# Patient Record
Sex: FEMALE | Race: WHITE | Employment: FULL TIME | URBAN - NONMETROPOLITAN AREA
[De-identification: names, ages, dates, MRNs, and addresses within clinical notes are randomized per-mention and may not be internally consistent; named-entity substitution may affect disease eponyms.]

---

## 2019-01-24 ENCOUNTER — HOSPITAL ENCOUNTER (EMERGENCY)
Age: 34
Discharge: HOME | End: 2019-01-24
Payer: COMMERCIAL

## 2019-01-24 ENCOUNTER — APPOINTMENT (OUTPATIENT)
Dept: CT IMAGING | Age: 34
End: 2019-01-24
Payer: COMMERCIAL

## 2019-01-24 VITALS
RESPIRATION RATE: 18 BRPM | WEIGHT: 172 LBS | DIASTOLIC BLOOD PRESSURE: 90 MMHG | BODY MASS INDEX: 33.77 KG/M2 | SYSTOLIC BLOOD PRESSURE: 155 MMHG | HEIGHT: 60 IN | TEMPERATURE: 97.4 F | OXYGEN SATURATION: 96 % | HEART RATE: 90 BPM

## 2019-01-24 DIAGNOSIS — A49.9 BACTERIAL UTI: ICD-10-CM

## 2019-01-24 DIAGNOSIS — N21.0 CALCULUS OF URINARY BLADDER: ICD-10-CM

## 2019-01-24 DIAGNOSIS — N39.0 BACTERIAL UTI: ICD-10-CM

## 2019-01-24 DIAGNOSIS — N20.0 KIDNEY STONE: Primary | ICD-10-CM

## 2019-01-24 DIAGNOSIS — R10.9 FLANK PAIN: ICD-10-CM

## 2019-01-24 LAB
ALBUMIN SERPL-MCNC: 3.9 G/DL (ref 3.5–5.2)
ALP BLD-CCNC: 63 U/L (ref 35–104)
ALT SERPL-CCNC: 22 U/L (ref 5–33)
ANION GAP SERPL CALCULATED.3IONS-SCNC: 12 MMOL/L (ref 7–19)
AST SERPL-CCNC: 20 U/L (ref 5–32)
BACTERIA: ABNORMAL /HPF
BASOPHILS ABSOLUTE: 0.1 K/UL (ref 0–0.2)
BASOPHILS RELATIVE PERCENT: 0.5 % (ref 0–1)
BILIRUB SERPL-MCNC: 0.3 MG/DL (ref 0.2–1.2)
BILIRUBIN URINE: NEGATIVE
BLOOD, URINE: ABNORMAL
BUN BLDV-MCNC: 14 MG/DL (ref 6–20)
CALCIUM SERPL-MCNC: 9 MG/DL (ref 8.6–10)
CHLORIDE BLD-SCNC: 104 MMOL/L (ref 98–111)
CLARITY: ABNORMAL
CO2: 22 MMOL/L (ref 22–29)
COLOR: YELLOW
CREAT SERPL-MCNC: 0.8 MG/DL (ref 0.5–0.9)
EOSINOPHILS ABSOLUTE: 0.1 K/UL (ref 0–0.6)
EOSINOPHILS RELATIVE PERCENT: 0.9 % (ref 0–5)
EPITHELIAL CELLS, UA: ABNORMAL /HPF
GFR NON-AFRICAN AMERICAN: >60
GLUCOSE BLD-MCNC: 152 MG/DL (ref 74–109)
GLUCOSE URINE: >=1000 MG/DL
HCG QUALITATIVE: NEGATIVE
HCT VFR BLD CALC: 42.9 % (ref 37–47)
HEMOGLOBIN: 13.6 G/DL (ref 12–16)
KETONES, URINE: NEGATIVE MG/DL
LACTIC ACID: 2.2 MMOL/L (ref 0.5–1.9)
LEUKOCYTE ESTERASE, URINE: ABNORMAL
LIPASE: 34 U/L (ref 13–60)
LYMPHOCYTES ABSOLUTE: 1.3 K/UL (ref 1.1–4.5)
LYMPHOCYTES RELATIVE PERCENT: 14.3 % (ref 20–40)
MCH RBC QN AUTO: 29 PG (ref 27–31)
MCHC RBC AUTO-ENTMCNC: 31.7 G/DL (ref 33–37)
MCV RBC AUTO: 91.5 FL (ref 81–99)
MONOCYTES ABSOLUTE: 0.5 K/UL (ref 0–0.9)
MONOCYTES RELATIVE PERCENT: 5.4 % (ref 0–10)
NEUTROPHILS ABSOLUTE: 7.2 K/UL (ref 1.5–7.5)
NEUTROPHILS RELATIVE PERCENT: 78.5 % (ref 50–65)
NITRITE, URINE: NEGATIVE
PDW BLD-RTO: 13.2 % (ref 11.5–14.5)
PH UA: 6
PLATELET # BLD: 371 K/UL (ref 130–400)
PMV BLD AUTO: 9.7 FL (ref 9.4–12.3)
POTASSIUM SERPL-SCNC: 4.6 MMOL/L (ref 3.5–5)
PROTEIN UA: 30 MG/DL
RBC # BLD: 4.69 M/UL (ref 4.2–5.4)
RBC UA: ABNORMAL /HPF (ref 0–2)
SODIUM BLD-SCNC: 138 MMOL/L (ref 136–145)
SPECIFIC GRAVITY UA: 1.03
TOTAL PROTEIN: 6.8 G/DL (ref 6.6–8.7)
URINE REFLEX TO CULTURE: YES
UROBILINOGEN, URINE: 0.2 E.U./DL
WBC # BLD: 9.2 K/UL (ref 4.8–10.8)
WBC UA: ABNORMAL /HPF (ref 0–5)

## 2019-01-24 PROCEDURE — 2580000003 HC RX 258: Performed by: NURSE PRACTITIONER

## 2019-01-24 PROCEDURE — 83690 ASSAY OF LIPASE: CPT

## 2019-01-24 PROCEDURE — 83605 ASSAY OF LACTIC ACID: CPT

## 2019-01-24 PROCEDURE — 87086 URINE CULTURE/COLONY COUNT: CPT

## 2019-01-24 PROCEDURE — 99284 EMERGENCY DEPT VISIT MOD MDM: CPT

## 2019-01-24 PROCEDURE — 36415 COLL VENOUS BLD VENIPUNCTURE: CPT

## 2019-01-24 PROCEDURE — 80053 COMPREHEN METABOLIC PANEL: CPT

## 2019-01-24 PROCEDURE — 99284 EMERGENCY DEPT VISIT MOD MDM: CPT | Performed by: NURSE PRACTITIONER

## 2019-01-24 PROCEDURE — 6360000002 HC RX W HCPCS: Performed by: NURSE PRACTITIONER

## 2019-01-24 PROCEDURE — 74150 CT ABDOMEN W/O CONTRAST: CPT

## 2019-01-24 PROCEDURE — 85025 COMPLETE CBC W/AUTO DIFF WBC: CPT

## 2019-01-24 PROCEDURE — 81001 URINALYSIS AUTO W/SCOPE: CPT

## 2019-01-24 PROCEDURE — 84703 CHORIONIC GONADOTROPIN ASSAY: CPT

## 2019-01-24 PROCEDURE — 96374 THER/PROPH/DIAG INJ IV PUSH: CPT

## 2019-01-24 PROCEDURE — 96375 TX/PRO/DX INJ NEW DRUG ADDON: CPT

## 2019-01-24 RX ORDER — MORPHINE SULFATE/0.9% NACL/PF 1 MG/ML
4 SYRINGE (ML) INJECTION ONCE
Status: COMPLETED | OUTPATIENT
Start: 2019-01-24 | End: 2019-01-24

## 2019-01-24 RX ORDER — ONDANSETRON 2 MG/ML
4 INJECTION INTRAMUSCULAR; INTRAVENOUS ONCE
Status: COMPLETED | OUTPATIENT
Start: 2019-01-24 | End: 2019-01-24

## 2019-01-24 RX ORDER — 0.9 % SODIUM CHLORIDE 0.9 %
1000 INTRAVENOUS SOLUTION INTRAVENOUS ONCE
Status: COMPLETED | OUTPATIENT
Start: 2019-01-24 | End: 2019-01-24

## 2019-01-24 RX ORDER — BUSPIRONE HYDROCHLORIDE 5 MG/1
5 TABLET ORAL 4 TIMES DAILY PRN
COMMUNITY

## 2019-01-24 RX ORDER — ONDANSETRON 4 MG/1
4 TABLET, ORALLY DISINTEGRATING ORAL EVERY 8 HOURS PRN
Qty: 15 TABLET | Refills: 0 | Status: SHIPPED | OUTPATIENT
Start: 2019-01-24 | End: 2020-05-18 | Stop reason: ALTCHOICE

## 2019-01-24 RX ORDER — CEPHALEXIN 500 MG/1
500 CAPSULE ORAL 2 TIMES DAILY
Qty: 10 CAPSULE | Refills: 0 | Status: SHIPPED | OUTPATIENT
Start: 2019-01-24 | End: 2019-01-29

## 2019-01-24 RX ORDER — HYDROCODONE BITARTRATE AND ACETAMINOPHEN 5; 325 MG/1; MG/1
1 TABLET ORAL EVERY 6 HOURS PRN
Qty: 12 TABLET | Refills: 0 | Status: SHIPPED | OUTPATIENT
Start: 2019-01-24 | End: 2019-01-27

## 2019-01-24 RX ORDER — ESCITALOPRAM OXALATE 20 MG/1
20 TABLET ORAL DAILY
COMMUNITY

## 2019-01-24 RX ORDER — NORGESTIMATE AND ETHINYL ESTRADIOL 7DAYSX3 LO
1 KIT ORAL DAILY
COMMUNITY

## 2019-01-24 RX ADMIN — Medication 4 MG: at 14:52

## 2019-01-24 RX ADMIN — SODIUM CHLORIDE 1000 ML: 9 INJECTION, SOLUTION INTRAVENOUS at 14:52

## 2019-01-24 RX ADMIN — ONDANSETRON 4 MG: 2 INJECTION INTRAMUSCULAR; INTRAVENOUS at 14:52

## 2019-01-24 ASSESSMENT — ENCOUNTER SYMPTOMS
NAUSEA: 1
BACK PAIN: 1
ABDOMINAL PAIN: 1

## 2019-01-24 ASSESSMENT — PAIN DESCRIPTION - ORIENTATION: ORIENTATION: RIGHT

## 2019-01-24 ASSESSMENT — PAIN DESCRIPTION - PAIN TYPE: TYPE: ACUTE PAIN

## 2019-01-24 ASSESSMENT — PAIN DESCRIPTION - DESCRIPTORS: DESCRIPTORS: SHARP

## 2019-01-24 ASSESSMENT — PAIN SCALES - GENERAL
PAINLEVEL_OUTOF10: 9
PAINLEVEL_OUTOF10: 4

## 2019-01-24 ASSESSMENT — PAIN DESCRIPTION - LOCATION: LOCATION: ABDOMEN;FLANK

## 2019-01-29 LAB
ORGANISM: ABNORMAL
URINE CULTURE, ROUTINE: ABNORMAL
URINE CULTURE, ROUTINE: ABNORMAL

## 2019-12-05 ENCOUNTER — OFFICE VISIT (OUTPATIENT)
Dept: NEUROLOGY | Age: 34
End: 2019-12-05
Payer: COMMERCIAL

## 2019-12-05 VITALS
WEIGHT: 166 LBS | DIASTOLIC BLOOD PRESSURE: 106 MMHG | HEIGHT: 60 IN | HEART RATE: 108 BPM | OXYGEN SATURATION: 98 % | BODY MASS INDEX: 32.59 KG/M2 | SYSTOLIC BLOOD PRESSURE: 155 MMHG

## 2019-12-05 DIAGNOSIS — G47.00 INSOMNIA, UNSPECIFIED TYPE: ICD-10-CM

## 2019-12-05 DIAGNOSIS — R40.0 SOMNOLENCE, DAYTIME: ICD-10-CM

## 2019-12-05 DIAGNOSIS — R06.83 SNORING: ICD-10-CM

## 2019-12-05 DIAGNOSIS — G47.33 OBSTRUCTIVE SLEEP APNEA: Primary | ICD-10-CM

## 2019-12-05 DIAGNOSIS — R06.81 WITNESSED APNEIC SPELLS: ICD-10-CM

## 2019-12-05 PROCEDURE — 99203 OFFICE O/P NEW LOW 30 MIN: CPT | Performed by: PHYSICIAN ASSISTANT

## 2019-12-09 DIAGNOSIS — G47.33 OBSTRUCTIVE SLEEP APNEA: Primary | ICD-10-CM

## 2019-12-09 DIAGNOSIS — G47.00 INSOMNIA, UNSPECIFIED TYPE: ICD-10-CM

## 2019-12-09 DIAGNOSIS — R06.81 WITNESSED APNEIC SPELLS: ICD-10-CM

## 2019-12-09 DIAGNOSIS — R40.0 SOMNOLENCE, DAYTIME: ICD-10-CM

## 2019-12-09 DIAGNOSIS — R06.83 SNORING: ICD-10-CM

## 2019-12-19 ENCOUNTER — HOSPITAL ENCOUNTER (OUTPATIENT)
Dept: SLEEP CENTER | Age: 34
Discharge: HOME | End: 2019-12-21
Payer: COMMERCIAL

## 2019-12-19 PROCEDURE — G0399 HOME SLEEP TEST/TYPE 3 PORTA: HCPCS

## 2019-12-27 PROCEDURE — 95806 SLEEP STUDY UNATT&RESP EFFT: CPT | Performed by: PSYCHIATRY & NEUROLOGY

## 2020-01-05 NOTE — PROGRESS NOTES
[address] [place]  Phone [phone] Fax [phone]     Patient Name: Georgette Estrada Page 2019  : 1985  Age: 29 y.o.   Patient Address: [address] [place] [address]       Patient Phone: [phone] (home)     REFERRAL  Referred to: DME provider of patient's choice  Rhina A Page is referred for the following:    DME Equipment HPCPS Code Setting   Auto Adjusting CPAP device with flex or comparable pressure relief per comfort  8cm to 20cm   Heated Humidifier  Patient Choice       Replinishible PAP Supplies, 1 year supply  Item HPCPS Code Frequency   Mask of choice  or  1 per 3 months   Nasal Mask cushion/pillows  or  2 per 30 days   Full Face Mask Interface  1 per 30 days   Headgear  1 per 6 months   Tubing, length of choice  or  1 per 3 months   Water Chamber  1 per 6 months   Chinstrap  1 per 6 months   Disposable Filters  2 per 30 days   Reusable Filters  1 per 6 months     Diagnoses:  Obstructive sleep apnea (G47.33)  Length of Need: Lifetime, 99    Ordering Provider: ELIUD Bermeoranjit Spragueon: [id number]         Signature:       Date: 2019      Electronically Signed by Joi Meraz M.D.

## 2020-01-29 ENCOUNTER — TELEPHONE (OUTPATIENT)
Dept: NEUROLOGY | Age: 35
End: 2020-01-29

## 2020-01-29 NOTE — TELEPHONE ENCOUNTER
Called patient to let her know that I have her scheduled an appointment for a follow up after being on her DME sleep machine. NO answer. Left a VM regarding when I have her scheduled an appointment.

## 2020-03-26 ENCOUNTER — TELEPHONE (OUTPATIENT)
Dept: NEUROLOGY | Age: 35
End: 2020-03-26

## 2020-05-13 ENCOUNTER — TELEPHONE (OUTPATIENT)
Dept: NEUROLOGY | Age: 35
End: 2020-05-13

## 2020-05-13 NOTE — TELEPHONE ENCOUNTER
Patient called today and no answer - detailed messaged left to remind them of their appointment: Friday, May 15th @ 2:30pm  and needing to change this appointment to a [facility] Video Visit and to please call the office back at [phone] Option 1. sh

## 2020-05-18 ENCOUNTER — TELEMEDICINE (OUTPATIENT)
Dept: NEUROLOGY | Age: 35
End: 2020-05-18
Payer: COMMERCIAL

## 2020-05-18 PROCEDURE — 99214 OFFICE O/P EST MOD 30 MIN: CPT | Performed by: PHYSICIAN ASSISTANT

## 2020-05-18 NOTE — PROGRESS NOTES
2020    TELEHEALTH EVALUATION -- Audio/Visual (During OYVFG-23 public health emergency)      Patient:   Rebecca Haywood  MR#:    [id number]  Account Number:                         YOB: 1985  Primary/Referring Physician:  Flynn Chew MD   Consulting Physician:   Tiffanie Hunt PA-C    NEW PATIENT CONSULTATION    OR    FOLLOW UP    Rhina Haywood is located at:  Home/outside  Also present during visit:  Nobody  Provider is located at [facility] in [place], [place]    Chief Complaint   Patient presents with    Sleep Apnea     Video visit        HPI:    Rhina Haywood (:  1985) has requested an audio/video evaluation for the following concern(s): Sleep clinic follow up      Rhina Haywood is a 28 y.o. female who has a history of FLYNN who was referred for an HST. She was diagnosed with FLYNN years ago and used CPAP. Her device broke. The HST, 2019 revealed an AHI of 89.8. Rhina Haywood is prescribed auto CPAP therapy with a pressure range of 8cm to 20cm. The compliance report indicates that she has only used CPAP  days. She has Rosacea and the FFM irritated her face. The head gear was causing migraines. She got a nasal cushion recently  and it has helped the problems. She is sleeping better. She met compliance in March. We have requested that compliance report. She sometimes falls asleep on the couch without it.      Location or symptom:  FLYNN  Onset:  Initial dx: years ago; most recent HST, 2019   Timing:  q hs  Severity:  Severe  Associated:  Snoring, witnessed apneas, and excessive daytime somnolence  Alleviated:  CPAP      Past Medical History:   Diagnosis Date    CPAP (continuous positive airway pressure) dependence     8cm to 20cm    Obstructive sleep apnea     AHI: 89.8 per HST, 2019    PCOS (polycystic ovarian syndrome)        Past Surgical History:   Procedure Laterality Date    ANKLE SURGERY      left ankle tendon repair    ANTERIOR CRUCIATE LIGAMENT REPAIR      left    PHYSICAL EXAMINATION:  Constitutional -   General appearance: Alert in no acute distress, well nourished, and  well developed. EYES -   Sclera and lids appears normal.  ENT-    Hearing intact. Ears and external nose on visible skin appear normal. Trachea appears midline. No observable anterior neck masses. No observable or audible rhinorrhea, and oral mucous membranes are moist without erythema. Pulmonary-   Breathing appears normal, good expansion, and normal effort without use of accessory muscles. Musculoskeletal -   No gross bony deformities. No splints, slings, or casts. Spine appears normal with normal posture and range of motion. Gait as below, see gait exam in the neurologic exam.  Skin -   No rash, erythema, or pallor on visible skin  Psychiatric -   Mood, affect, and behavior appear normal.   Memory as below see mental status examination in the neurologic exam.    NEUROLOGICAL EXAM    Mental status   [x]Awake, alert, oriented   [x]Affect attention and concentration appear appropriate  [x]Recent and remote memory appears unremarkable  [x]Speech normal without dysarthria or aphasia, comprehension and repetition intact. COMMENTS:    Cranial Nerves [x] PER, EOMI, no nystagmus, conjugate eye movements, no ptosis  [x]Face symmetric  [x]Tongue midline   [x]Shoulder shrug normal bilaterally  COMMENTS:   Motor   [x]Antigravity x 4 extremities  [x]Normal visible bulk and tone  [x]No tremor present  COMMENTS:       Coordination [x]DARELL normal bilaterally  [x]Extension to nose normal bilaterally  COMMENTS:    Gait                  [x]Normal steady gait    []Ataxic    []Spastic     []Magnetic     []Shuffling  COMMENTS:       [] OTHER:      Due to this being a TeleHealth encounter, evaluation of the following organ systems is limited: Vitals/Constitutional/EENT/Resp/CV/GI//MS/Neuro/Skin/Heme-Lymph-Imm.       LABS RECORD AND IMAGING REVIEW (As below and per HPI)    No results found for: Simona Ladd  Lab Results Component Value Date    WBC 9.2 01/24/2019    HGB 13.6 01/24/2019    HCT 42.9 01/24/2019    MCV 91.5 01/24/2019     01/24/2019     Lab Results   Component Value Date     01/24/2019    K 4.6 01/24/2019     01/24/2019    CO2 22 01/24/2019    BUN 14 01/24/2019    CREATININE 0.8 01/24/2019    GLUCOSE 152 (H) 01/24/2019    CALCIUM 9.0 01/24/2019    PROT 6.8 01/24/2019    LABALBU 3.9 01/24/2019    BILITOT 0.3 01/24/2019    ALKPHOS 63 01/24/2019    AST 20 01/24/2019    ALT 22 01/24/2019    LABGLOM >60 01/24/2019       No results found. I reviewed the following studies:       [x]  :  Clinical laboratory test results    []  :  Radiology reports    [x]  :  Review and summarization of medical records and/or obtain medical records     []  :  Previous/recent polysomnogram report(s)/HST    []  :  [place] Sleepiness Scale               [x]  :  Compliance download: The auto CPAP is set at a pressure range of 8cm to 20cm. Compliance download shows that she uses device: 30% of the time; percentage of days with usage >=4 hours: 27%. AHI: 3.1 (Large leaks noted)-discussed; also she reports that she did have a 30 day download previously and it did show that she met compliance; will request it. ASSESSMENT:    Rhina Haywood is a 28y.o. year old female evaluated via Telehealth encounter for evaluation of PAP efficacy and compliance. ICD-10-CM    1. Obstructive sleep apnea G47.33    2. CPAP (continuous positive airway pressure) dependence Z99.89             [x]  :  Stable-RUBY, per her report she CPAP compliance about 1-2 months ago     []  :  Improved                       []  :  Well controlled              []  :  Resolving     []  :  Resolved     []  :  Inadequately controlled     []  :  Worsening     []  :  Additional workup planned        PLAN:  No orders of the defined types were placed in this encounter.       1.   Reviewed the etiology,  pathophysiology, diagnosis, treatment options, and risks of

## 2020-05-18 NOTE — PATIENT INSTRUCTIONS
used to sleeping with any mask on the face. While your goal is to be able to sleep all night on CPAP, using it as long as you can tolerate it each night is better than nothing. Try to increase usage over time until you reach your goal.    My eyes are swollen or irritated  No air should be directed up in to the eye area with a properly sized and fitted mask. This might indicate leak in the top area of your mask; gently tighten the top mask straps taking care not to over tighten. This leak might also indicate a worn mask cushion that needs replacing. Some people naturally sleep with their eyes partially open which can cause dryness or irritation; they benefit from wearing a simple fabric eye mask. If swelling or irritation is chronic or persistent, consult with your sleep or primary care physician. I use a Nasal Pillow mask and my nostrils are sore  Skin irritation can quickly occur when the nasal pillows are not inserted properly. Try rotating the barrel that holds the nasal pillow inserts to a more comfortable angled position of the pillows in to the nostrils. After best positioning, if leak occurs at the nostril opening, size up. My mask is making a whistling noise  Most masks have exhalation ports (look for a tiny cluster of holes) that allow the escape of our CO2 (carbon dioxide). When routinely cleaning your mask parts, these tiny holes must be checked to make sure they are not soiled and clogged by body oil or bedding lint. When clogged, they can cause the mask to make a whistling noise. Use a sewing needle or toothpick to keep the holes free flowing. My bed partner is bothered by the air flow of the exhalation ports from my mask  All masks have exhalation ports to allow the escape of CO2 (carbon dioxide). The higher the machine pressure setting, the harsher this escape flow will be. Some masks have better air diffusion features than others.  Some patients resolve by side sleeping with their backs mask. After straps are adjusted, pull the mask out and away from your face (about 2 inches) and gently lay back on face. This allows the dual mask cushions to inflate which will assure the best seal possible and comfortable fit. Mask fit varies with sleeping position, so if you fit for side or stomach sleeping, you will need to readjust if you roll to your back. This is why many patients train themselves to sleep solely on side or stomach (same mask fit) versus back sleeping which has a different mask fit. Mask fit tips  Full Face - Mask users with forehead pads/brace: tighten upper strap first, then follow with lower strap positioning and fit. Mask Headgear - Masks come with a one size fits most head gear. Larger and smaller strapped headgear may be available by special order. Nasal Pillow Mask - Place mask on face and position headgear and place side straps above the ears. Gently slip nasal pillows in to the nostrils making sure to rotate the angle of the pillow barrel for a comfortable fit. The pillows are meant to lie just inside the nostril opening, not to be aggressively inserted. Proper placement should not cause the tip of the nose to be raised. Patient education: Sleep apnea in adults       INTRODUCTION -- Normally during sleep, air moves through the throat and in and out of the lungs at a regular rhythm. In a person with sleep apnea, air movement is periodically diminished or stopped. There are two types of sleep apnea: obstructive sleep apnea and central sleep apnea. In obstructive sleep apnea, breathing is abnormal because of narrowing or closure of the throat. In central sleep apnea, breathing is abnormal because of a change in the breathing control and rhythm. Sleep apnea is a serious condition that can affect a person's ability to safely perform normal daily activities and can affect long term health. Approximately 25 percent of adults are at risk for sleep apnea of some degree.   Men are more commonly affected than women. Other risk factors include middle and older age, being overweight or obese, and having a small mouth and throat. This topic review focuses on the most common type of sleep apnea in adults, obstructive sleep apnea (RUBY). HOW SLEEP APNEA OCCURS -- The throat is surrounded by muscles that control the airway for speaking, swallowing, and breathing. During sleep, these muscles are less active, and this causes the throat to narrow. In most people, this narrowing does not affect breathing. In others, it can cause snoring, sometimes with reduced or completely blocked airflow. A completely blocked airway without airflow is called an obstructive apnea. Partial obstruction with diminished airflow is called a hypopnea. A person may have apnea and hypopnea during sleep. Insufficient breathing due to apnea or hypopnea causes oxygen levels to fall and carbon dioxide to rise. Because the airway is blocked, breathing faster or harder does not help to improve oxygen levels until the airway is reopened. Typically, the obstruction requires the person to awaken to activate the upper airway muscles. Once the airway is opened, the person then takes several deep breaths to catch up on breathing. As the person awakens, he or she may move briefly, snort or snore, and take a deep breath. Less frequently, a person may awaken completely with a sensation of gasping, smothering, or choking. If the person falls back to sleep quickly, he or she will not remember the event. Many people with sleep apnea are unaware of their abnormal breathing in sleep, and all patients underestimate how often their sleep is interrupted. Awakening from sleep causes sleep to be unrefreshing and causes fatigue and daytime sleepiness. Anatomic causes of obstructive sleep apnea --  Most patients have RUBY because of a small upper airway.  As the bones of the face and skull develop, some people develop a small lower face, a small Effective treatment will eliminate the symptoms of sleep disturbance; long-term health consequences are also reduced. Most treatments require nightly use. The challenge for the clinician and the patient is to select an effective therapy that is appropriate for the patient's problem and that is acceptable for long term use. Auto-titrating CPAP delivers an amount of PAP that varies during the night. The variation is dependent on event detection software algorithms, which will increase the pressure gradually in response to flow changes until adequate patency is detected. After a period of sustained upper airway patency, the delivered level of pressure gradually decreases until the algorithm identifies recurrent upper airway obstruction, at which point the delivered pressure again increases. The result is that the delivered pressure varies throughout the night, in an effort to provide the lowest pressure that is necessary to maintain upper airway patency. Continuous positive airway pressure (CPAP) -- The most effective treatment for sleep apnea uses air pressure from a mechanical device to keep the upper airway open during sleep. A CPAP (continuous positive airway pressure)  device uses an air-tight attachment to the nose, typically a mask, connected to a tube and a blower which generates the pressure. Devices that fit comfortably into the nasal opening, rather than over the nose, are also available. CPAP should be used any time the person sleeps (day or night). The CPAP device is usually used for the first time in the sleep lab, where a technician can adjust the pressure and select the best equipment to keep the airway open.  Alternatively, an auto device with a self-adjusting pressure feature, provided with proper education and training, can get treatment started without another sleep test. While the treatment may seem uncomfortable, noisy, or bulky at first, most people accept the treatment after [employer] that works to [employer] and safety by [employer] and [employer] understanding of sleep and sleep disorders. Supports sleep-related education, research, and advocacy; produces and distributes educational materials to the public and healthcare professionals; and offers postdoctoral fellowships and grants for sleep researchers. Rocio Dietrich [address]   Yandel@[employer]. org   [facility] org   Tel: [phone]   Fax: [phone]    Important information:  Medicare/private insurance CPAP/BiPAP/APAP requirements:  Medicare/private insurance has specific requirements for PAP compliance that must be met during the first 90 days of use to continue coverage for CPAP/BiPAP/APAP  from day 91 and beyond. The policy requires that patients use a PAP device 4 hours per 24 hour period, at least 70% of the time over a 30 day period. This data must be downloaded as a report direct from the PAP devices. This is called a compliance download. Your PAP supplier will assist you in this matter. Reminder:  Please bring a copy of the compliance download to your next office visit or have your supplier fax it to our office prior to your office visit. Note:  Where applicable, we will utilize PAP device efficiency reports, additional testing, and face-to-face  clinical evaluation subsequent to any treatment, changes in treatment, and continued treatment. Caution:  Please abstain from driving or engaging in other activities which may be hazardous in the presence of diminished alertness or daytime drowsiness. And avoid the use of sedatives or alcohol, which can worsen sleep apnea and daytime drowsiness. Mask suggestions:  - [employer]med Airfit N20 (Nasal) or F20 (Full face mask). They conform to your face, thus decreasing the potential for mask leakage. You might like the [employer] (full face mask).   It has a \"memory

## 2020-05-18 NOTE — PROGRESS NOTES
REVIEW OF SYSTEMS    Constitutional: []Fever []Sweat []Chills [] Recent Injury [x] Denies all unless marked  HEENT:[x]Headache  [] Head Injury/Hearing Loss  [] Sore Throat  [x] Ear Ache/Dizziness  [x] Denies all unless marked  Spine:  [] Neck pain  [] Back pain  [] Sciaticia  [x] Denies all unless marked  Cardiovascular:[]Heart Disease []Chest Pain [] Palpitations  [x] Denies all unless marked  Pulmonary: []Shortness of Breath []Cough   [x] Denies all unless marke  Gastrointestinal: []Nausea  []Vomiting  []Abdominal Pain  []Constipation  []Diarrhea  []Dark Bloody Stools  [x] Denies all unless marked  Psychiatric/Behavioral:[x] Depression [x] Anxiety [x] Denies all unless marked  Genitourinary:   [] Frequency  [] Urgency  [] Incontinence [] Pain with Urination  [x] Denies all unless marked  Extremities: []Pain  []Swelling  [x] Denies all unless marked  Musculoskeletal: [] Muscle Pain  [] Joint Pain  [] Arthritis [] Muscle Cramps [] Muscle Twitches  [x] Denies all unless marked  Sleep: [x] Insomnia [] Snoring [] Restless Legs [x] Sleep Apnea  [x] Daytime Sleepiness  [x] Denies all unless marked  Skin:[] Rash [] Skin Discoloration [x] Denies all unless marked   Neurological: []Visual Disturbance/Memory Loss [] Loss of Balance [] Slurred Speech/Weakness [] Seizures  [] Vertigo/Dizziness [x] Denies all unless marked

## 2021-03-01 ENCOUNTER — OFFICE VISIT (OUTPATIENT)
Dept: OTOLARYNGOLOGY | Facility: CLINIC | Age: 36
End: 2021-03-01

## 2021-03-01 VITALS
WEIGHT: 180.4 LBS | HEIGHT: 60 IN | HEART RATE: 84 BPM | TEMPERATURE: 98.2 F | SYSTOLIC BLOOD PRESSURE: 134 MMHG | BODY MASS INDEX: 35.42 KG/M2 | DIASTOLIC BLOOD PRESSURE: 93 MMHG

## 2021-03-01 DIAGNOSIS — H90.0 CONDUCTIVE HEARING LOSS, BILATERAL: ICD-10-CM

## 2021-03-01 DIAGNOSIS — H66.006 RECURRENT ACUTE SUPPURATIVE OTITIS MEDIA WITHOUT SPONTANEOUS RUPTURE OF TYMPANIC MEMBRANE OF BOTH SIDES: ICD-10-CM

## 2021-03-01 DIAGNOSIS — H69.83 DYSFUNCTION OF BOTH EUSTACHIAN TUBES: ICD-10-CM

## 2021-03-01 DIAGNOSIS — H65.33 CHRONIC MUCOID OTITIS MEDIA OF BOTH EARS: Primary | ICD-10-CM

## 2021-03-01 PROBLEM — H69.93 DYSFUNCTION OF BOTH EUSTACHIAN TUBES: Status: ACTIVE | Noted: 2021-03-01

## 2021-03-01 PROCEDURE — 99204 OFFICE O/P NEW MOD 45 MIN: CPT | Performed by: PHYSICIAN ASSISTANT

## 2021-03-01 RX ORDER — METOPROLOL SUCCINATE 100 MG/1
100 TABLET, EXTENDED RELEASE ORAL DAILY
COMMUNITY
End: 2021-05-04

## 2021-03-01 RX ORDER — BUSPIRONE HYDROCHLORIDE 5 MG/1
10 TABLET ORAL 4 TIMES DAILY PRN
COMMUNITY
End: 2023-02-21

## 2021-03-01 RX ORDER — ESCITALOPRAM OXALATE 20 MG/1
20 TABLET ORAL DAILY
COMMUNITY
End: 2022-07-05

## 2021-03-01 RX ORDER — NORGESTIMATE AND ETHINYL ESTRADIOL 7DAYSX3 LO
1 KIT ORAL
COMMUNITY
End: 2021-03-16

## 2021-03-01 RX ORDER — OLMESARTAN MEDOXOMIL AND HYDROCHLOROTHIAZIDE 40/25 40; 25 MG/1; MG/1
1 TABLET ORAL DAILY
COMMUNITY
Start: 2021-02-15 | End: 2022-09-20

## 2021-03-01 NOTE — PROGRESS NOTES
ARLINE Alfonso     Chief Complaint   Patient presents with   • Otitis Media     bilateral   • Hearing Loss     left        HISTORY OF PRESENT ILLNESS:     Shante Negrete is a  36 y.o.  female who complains of ear fullness, ear pressure, recurrent ear infections and chronic fluid on the ear. The symptoms are localized to the left> right  ear. The patient has had moderate to severe symptoms. The symptoms have been chronically occuring with acute flair ups occurring 6-7 times for the last 6-7 months. The symptoms are aggravated by  no identifiable factors. The symptoms are improved by no identifiable factors.              Review of Systems   Constitutional: Negative for activity change, appetite change, chills, diaphoresis, fatigue, fever and unexpected weight change.   HENT: Positive for hearing loss. Negative for congestion, dental problem, drooling, ear discharge, ear pain, facial swelling, mouth sores, nosebleeds, postnasal drip, rhinorrhea, sinus pressure, sneezing, sore throat, tinnitus, trouble swallowing and voice change.         Ear fullness and pressure   Eyes: Negative.    Respiratory: Negative.    Cardiovascular: Negative.    Gastrointestinal: Negative.    Endocrine: Negative.    Skin: Negative.    Allergic/Immunologic: Negative for environmental allergies, food allergies and immunocompromised state.   Neurological: Negative.    Hematological: Negative.    Psychiatric/Behavioral: Negative.    :    Past History:  Past Medical History:   Diagnosis Date   • Ear infection    • High blood pressure    • HL (hearing loss)      Past Surgical History:   Procedure Laterality Date   • ADENOIDECTOMY     • BREAST SURGERY     • D&C AND LAPAROSCOPY     • FEMUR FRACTURE SURGERY     • FOOT TENDON SURGERY Left    • KNEE ACL RECONSTRUCTION Left    • MYRINGOTOMY W/ TUBES     • TONSILLECTOMY       Family History   Problem Relation Age of Onset   • Hypertension Father    • Deep vein thrombosis Father      Social History      Tobacco Use   • Smoking status: Never Smoker   • Smokeless tobacco: Never Used   Substance Use Topics   • Alcohol use: Yes     Comment: rare   • Drug use: Never     Outpatient Medications Marked as Taking for the 3/1/21 encounter (Office Visit) with Roby Menard PA   Medication Sig Dispense Refill   • busPIRone (BUSPAR) 5 MG tablet buspirone 5 mg tablet     • escitalopram (LEXAPRO) 20 MG tablet Lexapro 20 mg tablet 1 Tablet(s) Oral Take once a day     • metoprolol succinate XL (TOPROL-XL) 50 MG 24 hr tablet Take 1 tablet every day by oral route for 90 days.     • norgestimate-ethinyl estradiol (ORTHO TRI-CYCLEN LO) 0.18/0.215/0.25 MG-25 MCG per tablet Take 1 tablet by mouth.     • olmesartan-hydrochlorothiazide (BENICAR HCT) 40-25 MG per tablet        Allergies:  Gold-containing drug products          Vital Signs:   Vitals:    03/01/21 1446   BP: 134/93   Pulse: 84   Temp: 98.2 °F (36.8 °C)         EXAMINATION:   CONSTITUTIONAL: well nourished, alert, oriented, in no acute distress     COMMUNICATION AND VOICE: able to communicate normally, normal voice quality    HEAD: normocephalic, no lesions, atraumatic, no tenderness, no masses     FACE: appearance normal, no lesions, no tenderness, no deformities, facial motion symmetric    EYES: ocular motility normal, eyelids normal, orbits normal, no proptosis, conjunctiva normal , pupils equal, round     EARS:  Hearing: response to conversational voice normal bilaterally   External Ears: auricles without lesions  Otoscopic: bilateral tympanic membrane appearance with myringosclerosis, no lesions, no perforation, decreased mobility with effusion    NOSE:  External Nose: structure normal, no tenderness on palpation, no nasal discharge, no lesions, no evidence of trauma, nostrils patent   Intranasal Exam: nasal mucosa normal, vestibule within normal limits, inferior turbinate normal, nasal septum midline     ORAL:  Lips: upper and lower lips without lesion    Teeth: dentition within normal limits for age   Gums: gingivae healthy   Oral Mucosa: oral mucosa normal, no mucosal lesions   Floor of Mouth: Warthin’s duct patent, mucosa normal  Tongue: lingual mucosa normal without lesions, normal tongue mobility   Palate: soft and hard palates with normal mucosa and structure  Oropharynx: oropharyngeal mucosa normal    NECK: neck appearance normal    CHEST/RESPIRATORY: respiratory effort normal, normal breath sounds     CARDIOVASCULAR: rate and rhythm normal, extremities without cyanosis or edema      NEUROLOGIC/PSYCHIATRIC: oriented to time, place and person, mood normal, affect appropriate, CN II-XII intact grossly    RESULTS REVIEW:    I have reviewed the patients old records in the chart.  Audiologic testing reviewed.       Assessment    Diagnosis Plan   1. Chronic mucoid otitis media of both ears  Case Request    CBC & Differential    Comprehensive Metabolic Panel    ECG 12 Lead    XR Chest 2 View    Case Request   2. Recurrent acute suppurative otitis media without spontaneous rupture of tympanic membrane of both sides  Case Request    CBC & Differential    Comprehensive Metabolic Panel    ECG 12 Lead    XR Chest 2 View    Case Request   3. Dysfunction of both eustachian tubes  Case Request    CBC & Differential    Comprehensive Metabolic Panel    ECG 12 Lead    XR Chest 2 View    Case Request   4. Conductive hearing loss, bilateral  Case Request    CBC & Differential    Comprehensive Metabolic Panel    ECG 12 Lead    XR Chest 2 View    Case Request       Plan    Patient Instructions   BILATERAL MYRINGOTOMY TUBE INSERTION: The risks and benefits of myringotomy tube insertion were explained including but not limited to pain, aural fullness, bleeding, infection, risks of the anesthesia, persistent tympanic membrane perforation, chronic otorrhea, early and late extrusion, and the possibility for the need of reinsertion after extrusion. Alternatives were discussed. The  patient/parents demonstrated understanding of these risks. Questions were asked appropriately answered.         Orders Placed This Encounter   Procedures   • XR Chest 2 View   • Comprehensive Metabolic Panel   • Follow Anesthesia Guidelines / Standing Orders   • Obtain informed consent   • Provide NPO Instructions to Patient   • ECG 12 Lead   • CBC & Differential          Return for Follow-up post-operatively as directed.    ARLINE Alfonso  03/01/21  15:03 CST

## 2021-03-12 ENCOUNTER — TRANSCRIBE ORDERS (OUTPATIENT)
Dept: ADMINISTRATIVE | Facility: HOSPITAL | Age: 36
End: 2021-03-12

## 2021-03-12 DIAGNOSIS — Z11.59 SCREENING FOR VIRAL DISEASE: Primary | ICD-10-CM

## 2021-03-16 ENCOUNTER — LAB (OUTPATIENT)
Dept: LAB | Facility: HOSPITAL | Age: 36
End: 2021-03-16

## 2021-03-16 ENCOUNTER — HOSPITAL ENCOUNTER (OUTPATIENT)
Dept: GENERAL RADIOLOGY | Facility: HOSPITAL | Age: 36
Discharge: HOME | End: 2021-03-16

## 2021-03-16 ENCOUNTER — APPOINTMENT (OUTPATIENT)
Dept: PREADMISSION TESTING | Facility: HOSPITAL | Age: 36
End: 2021-03-16

## 2021-03-16 VITALS
OXYGEN SATURATION: 99 % | RESPIRATION RATE: 18 BRPM | HEART RATE: 107 BPM | SYSTOLIC BLOOD PRESSURE: 146 MMHG | BODY MASS INDEX: 33.76 KG/M2 | WEIGHT: 178.79 LBS | HEIGHT: 61 IN | DIASTOLIC BLOOD PRESSURE: 88 MMHG

## 2021-03-16 DIAGNOSIS — H69.83 DYSFUNCTION OF BOTH EUSTACHIAN TUBES: ICD-10-CM

## 2021-03-16 DIAGNOSIS — H65.33 CHRONIC MUCOID OTITIS MEDIA OF BOTH EARS: ICD-10-CM

## 2021-03-16 DIAGNOSIS — H90.0 CONDUCTIVE HEARING LOSS, BILATERAL: ICD-10-CM

## 2021-03-16 DIAGNOSIS — H66.006 RECURRENT ACUTE SUPPURATIVE OTITIS MEDIA WITHOUT SPONTANEOUS RUPTURE OF TYMPANIC MEMBRANE OF BOTH SIDES: ICD-10-CM

## 2021-03-16 LAB
ALBUMIN SERPL-MCNC: 3.5 G/DL (ref 3.5–5.2)
ALBUMIN/GLOB SERPL: 1 G/DL
ALP SERPL-CCNC: 71 U/L (ref 39–117)
ALT SERPL W P-5'-P-CCNC: 34 U/L (ref 1–33)
ANION GAP SERPL CALCULATED.3IONS-SCNC: 11 MMOL/L (ref 5–15)
AST SERPL-CCNC: 35 U/L (ref 1–32)
BASOPHILS # BLD AUTO: 0.03 10*3/MM3 (ref 0–0.2)
BASOPHILS NFR BLD AUTO: 0.4 % (ref 0–1.5)
BILIRUB SERPL-MCNC: 0.3 MG/DL (ref 0–1.2)
BUN SERPL-MCNC: 15 MG/DL (ref 6–20)
BUN/CREAT SERPL: 20 (ref 7–25)
CALCIUM SPEC-SCNC: 8.6 MG/DL (ref 8.6–10.5)
CHLORIDE SERPL-SCNC: 102 MMOL/L (ref 98–107)
CO2 SERPL-SCNC: 25 MMOL/L (ref 22–29)
CREAT SERPL-MCNC: 0.75 MG/DL (ref 0.57–1)
DEPRECATED RDW RBC AUTO: 44.2 FL (ref 37–54)
EOSINOPHIL # BLD AUTO: 0.25 10*3/MM3 (ref 0–0.4)
EOSINOPHIL NFR BLD AUTO: 3.7 % (ref 0.3–6.2)
ERYTHROCYTE [DISTWIDTH] IN BLOOD BY AUTOMATED COUNT: 13.4 % (ref 12.3–15.4)
GFR SERPL CREATININE-BSD FRML MDRD: 87 ML/MIN/1.73
GLOBULIN UR ELPH-MCNC: 3.5 GM/DL
GLUCOSE SERPL-MCNC: 257 MG/DL (ref 65–99)
HCT VFR BLD AUTO: 37.6 % (ref 34–46.6)
HGB BLD-MCNC: 12.5 G/DL (ref 12–15.9)
IMM GRANULOCYTES # BLD AUTO: 0.04 10*3/MM3 (ref 0–0.05)
IMM GRANULOCYTES NFR BLD AUTO: 0.6 % (ref 0–0.5)
LYMPHOCYTES # BLD AUTO: 1.65 10*3/MM3 (ref 0.7–3.1)
LYMPHOCYTES NFR BLD AUTO: 24.7 % (ref 19.6–45.3)
MCH RBC QN AUTO: 30 PG (ref 26.6–33)
MCHC RBC AUTO-ENTMCNC: 33.2 G/DL (ref 31.5–35.7)
MCV RBC AUTO: 90.2 FL (ref 79–97)
MONOCYTES # BLD AUTO: 0.45 10*3/MM3 (ref 0.1–0.9)
MONOCYTES NFR BLD AUTO: 6.7 % (ref 5–12)
NEUTROPHILS NFR BLD AUTO: 4.26 10*3/MM3 (ref 1.7–7)
NEUTROPHILS NFR BLD AUTO: 63.9 % (ref 42.7–76)
NRBC BLD AUTO-RTO: 0 /100 WBC (ref 0–0.2)
PLATELET # BLD AUTO: 318 10*3/MM3 (ref 140–450)
PMV BLD AUTO: 9.5 FL (ref 6–12)
POTASSIUM SERPL-SCNC: 3.6 MMOL/L (ref 3.5–5.2)
PROT SERPL-MCNC: 7 G/DL (ref 6–8.5)
RBC # BLD AUTO: 4.17 10*6/MM3 (ref 3.77–5.28)
SARS-COV-2 ORF1AB RESP QL NAA+PROBE: NOT DETECTED
SODIUM SERPL-SCNC: 138 MMOL/L (ref 136–145)
WBC # BLD AUTO: 6.68 10*3/MM3 (ref 3.4–10.8)

## 2021-03-16 PROCEDURE — U0004 COV-19 TEST NON-CDC HGH THRU: HCPCS | Performed by: OTOLARYNGOLOGY

## 2021-03-16 PROCEDURE — 80053 COMPREHEN METABOLIC PANEL: CPT

## 2021-03-16 PROCEDURE — 93005 ELECTROCARDIOGRAM TRACING: CPT

## 2021-03-16 PROCEDURE — 93010 ELECTROCARDIOGRAM REPORT: CPT | Performed by: INTERNAL MEDICINE

## 2021-03-16 PROCEDURE — 71046 X-RAY EXAM CHEST 2 VIEWS: CPT

## 2021-03-16 PROCEDURE — 36415 COLL VENOUS BLD VENIPUNCTURE: CPT

## 2021-03-16 PROCEDURE — 85025 COMPLETE CBC W/AUTO DIFF WBC: CPT

## 2021-03-16 PROCEDURE — C9803 HOPD COVID-19 SPEC COLLECT: HCPCS | Performed by: OTOLARYNGOLOGY

## 2021-03-16 RX ORDER — LEVONORGESTREL AND ETHINYL ESTRADIOL 0.1-0.02MG
1 KIT ORAL DAILY
COMMUNITY
End: 2022-07-05

## 2021-03-16 RX ORDER — LORATADINE 10 MG/1
10 TABLET ORAL DAILY
COMMUNITY

## 2021-03-18 LAB
QT INTERVAL: 384 MS
QTC INTERVAL: 467 MS

## 2021-03-19 ENCOUNTER — HOSPITAL ENCOUNTER (OUTPATIENT)
Facility: HOSPITAL | Age: 36
Setting detail: HOSPITAL OUTPATIENT SURGERY
Discharge: HOME | End: 2021-03-19
Attending: OTOLARYNGOLOGY | Admitting: OTOLARYNGOLOGY

## 2021-03-19 ENCOUNTER — ANESTHESIA EVENT (OUTPATIENT)
Dept: PERIOP | Facility: HOSPITAL | Age: 36
End: 2021-03-19

## 2021-03-19 ENCOUNTER — ANESTHESIA (OUTPATIENT)
Dept: PERIOP | Facility: HOSPITAL | Age: 36
End: 2021-03-19

## 2021-03-19 VITALS
OXYGEN SATURATION: 94 % | DIASTOLIC BLOOD PRESSURE: 80 MMHG | HEART RATE: 63 BPM | TEMPERATURE: 98.3 F | SYSTOLIC BLOOD PRESSURE: 108 MMHG | RESPIRATION RATE: 16 BRPM

## 2021-03-19 LAB — B-HCG UR QL: NEGATIVE

## 2021-03-19 PROCEDURE — 69436 CREATE EARDRUM OPENING: CPT | Performed by: OTOLARYNGOLOGY

## 2021-03-19 PROCEDURE — 81025 URINE PREGNANCY TEST: CPT | Performed by: OTOLARYNGOLOGY

## 2021-03-19 PROCEDURE — 25010000002 DEXAMETHASONE PER 1 MG: Performed by: ANESTHESIOLOGY

## 2021-03-19 PROCEDURE — 25010000002 FENTANYL CITRATE (PF) 100 MCG/2ML SOLUTION: Performed by: NURSE ANESTHETIST, CERTIFIED REGISTERED

## 2021-03-19 PROCEDURE — 25010000002 PROPOFOL 10 MG/ML EMULSION: Performed by: NURSE ANESTHETIST, CERTIFIED REGISTERED

## 2021-03-19 DEVICE — VENT TUBE 1056016 5PK T TUBE 1.14MM
Type: IMPLANTABLE DEVICE | Site: EAR | Status: FUNCTIONAL
Brand: GOODE T-TUBE®

## 2021-03-19 RX ORDER — FENTANYL CITRATE 50 UG/ML
INJECTION, SOLUTION INTRAMUSCULAR; INTRAVENOUS AS NEEDED
Status: DISCONTINUED | OUTPATIENT
Start: 2021-03-19 | End: 2021-03-19 | Stop reason: SURG

## 2021-03-19 RX ORDER — OXYCODONE AND ACETAMINOPHEN 10; 325 MG/1; MG/1
1 TABLET ORAL ONCE AS NEEDED
Status: DISCONTINUED | OUTPATIENT
Start: 2021-03-19 | End: 2021-03-19 | Stop reason: HOSPADM

## 2021-03-19 RX ORDER — OXYCODONE AND ACETAMINOPHEN 7.5; 325 MG/1; MG/1
2 TABLET ORAL EVERY 4 HOURS PRN
Status: DISCONTINUED | OUTPATIENT
Start: 2021-03-19 | End: 2021-03-19 | Stop reason: HOSPADM

## 2021-03-19 RX ORDER — LIDOCAINE HYDROCHLORIDE 10 MG/ML
0.5 INJECTION, SOLUTION EPIDURAL; INFILTRATION; INTRACAUDAL; PERINEURAL ONCE AS NEEDED
Status: DISCONTINUED | OUTPATIENT
Start: 2021-03-19 | End: 2021-03-19 | Stop reason: HOSPADM

## 2021-03-19 RX ORDER — SODIUM CHLORIDE 0.9 % (FLUSH) 0.9 %
3 SYRINGE (ML) INJECTION AS NEEDED
Status: DISCONTINUED | OUTPATIENT
Start: 2021-03-19 | End: 2021-03-19 | Stop reason: HOSPADM

## 2021-03-19 RX ORDER — MIDAZOLAM HYDROCHLORIDE 1 MG/ML
1 INJECTION INTRAMUSCULAR; INTRAVENOUS
Status: DISCONTINUED | OUTPATIENT
Start: 2021-03-19 | End: 2021-03-19 | Stop reason: HOSPADM

## 2021-03-19 RX ORDER — CIPROFLOXACIN AND DEXAMETHASONE 3; 1 MG/ML; MG/ML
SUSPENSION/ DROPS AURICULAR (OTIC) AS NEEDED
Status: DISCONTINUED | OUTPATIENT
Start: 2021-03-19 | End: 2021-03-19 | Stop reason: HOSPADM

## 2021-03-19 RX ORDER — CIPROFLOXACIN AND DEXAMETHASONE 3; 1 MG/ML; MG/ML
4 SUSPENSION/ DROPS AURICULAR (OTIC) 2 TIMES DAILY
Status: DISCONTINUED | OUTPATIENT
Start: 2021-03-19 | End: 2021-03-19 | Stop reason: HOSPADM

## 2021-03-19 RX ORDER — SODIUM CHLORIDE 0.9 % (FLUSH) 0.9 %
10 SYRINGE (ML) INJECTION AS NEEDED
Status: DISCONTINUED | OUTPATIENT
Start: 2021-03-19 | End: 2021-03-19 | Stop reason: HOSPADM

## 2021-03-19 RX ORDER — MIDAZOLAM HYDROCHLORIDE 1 MG/ML
2 INJECTION INTRAMUSCULAR; INTRAVENOUS
Status: DISCONTINUED | OUTPATIENT
Start: 2021-03-19 | End: 2021-03-19 | Stop reason: HOSPADM

## 2021-03-19 RX ORDER — NALOXONE HCL 0.4 MG/ML
0.4 VIAL (ML) INJECTION AS NEEDED
Status: DISCONTINUED | OUTPATIENT
Start: 2021-03-19 | End: 2021-03-19 | Stop reason: HOSPADM

## 2021-03-19 RX ORDER — DEXAMETHASONE SODIUM PHOSPHATE 4 MG/ML
4 INJECTION, SOLUTION INTRA-ARTICULAR; INTRALESIONAL; INTRAMUSCULAR; INTRAVENOUS; SOFT TISSUE ONCE AS NEEDED
Status: COMPLETED | OUTPATIENT
Start: 2021-03-19 | End: 2021-03-19

## 2021-03-19 RX ORDER — PROPOFOL 10 MG/ML
VIAL (ML) INTRAVENOUS AS NEEDED
Status: DISCONTINUED | OUTPATIENT
Start: 2021-03-19 | End: 2021-03-19 | Stop reason: SURG

## 2021-03-19 RX ORDER — SODIUM CHLORIDE, SODIUM LACTATE, POTASSIUM CHLORIDE, CALCIUM CHLORIDE 600; 310; 30; 20 MG/100ML; MG/100ML; MG/100ML; MG/100ML
1000 INJECTION, SOLUTION INTRAVENOUS CONTINUOUS
Status: DISCONTINUED | OUTPATIENT
Start: 2021-03-19 | End: 2021-03-19 | Stop reason: HOSPADM

## 2021-03-19 RX ORDER — SODIUM CHLORIDE, SODIUM LACTATE, POTASSIUM CHLORIDE, CALCIUM CHLORIDE 600; 310; 30; 20 MG/100ML; MG/100ML; MG/100ML; MG/100ML
9 INJECTION, SOLUTION INTRAVENOUS CONTINUOUS
Status: DISCONTINUED | OUTPATIENT
Start: 2021-03-19 | End: 2021-03-19 | Stop reason: HOSPADM

## 2021-03-19 RX ORDER — DEXTROSE MONOHYDRATE 25 G/50ML
12.5 INJECTION, SOLUTION INTRAVENOUS AS NEEDED
Status: DISCONTINUED | OUTPATIENT
Start: 2021-03-19 | End: 2021-03-19 | Stop reason: HOSPADM

## 2021-03-19 RX ORDER — ONDANSETRON 2 MG/ML
4 INJECTION INTRAMUSCULAR; INTRAVENOUS ONCE AS NEEDED
Status: DISCONTINUED | OUTPATIENT
Start: 2021-03-19 | End: 2021-03-19 | Stop reason: HOSPADM

## 2021-03-19 RX ORDER — FENTANYL CITRATE 50 UG/ML
25 INJECTION, SOLUTION INTRAMUSCULAR; INTRAVENOUS
Status: DISCONTINUED | OUTPATIENT
Start: 2021-03-19 | End: 2021-03-19 | Stop reason: HOSPADM

## 2021-03-19 RX ORDER — SCOLOPAMINE TRANSDERMAL SYSTEM 1 MG/1
1 PATCH, EXTENDED RELEASE TRANSDERMAL CONTINUOUS
Status: DISCONTINUED | OUTPATIENT
Start: 2021-03-19 | End: 2021-03-19 | Stop reason: HOSPADM

## 2021-03-19 RX ORDER — IBUPROFEN 600 MG/1
600 TABLET ORAL ONCE AS NEEDED
Status: DISCONTINUED | OUTPATIENT
Start: 2021-03-19 | End: 2021-03-19 | Stop reason: HOSPADM

## 2021-03-19 RX ORDER — FLUMAZENIL 0.1 MG/ML
0.2 INJECTION INTRAVENOUS AS NEEDED
Status: DISCONTINUED | OUTPATIENT
Start: 2021-03-19 | End: 2021-03-19 | Stop reason: HOSPADM

## 2021-03-19 RX ORDER — LABETALOL HYDROCHLORIDE 5 MG/ML
5 INJECTION, SOLUTION INTRAVENOUS
Status: DISCONTINUED | OUTPATIENT
Start: 2021-03-19 | End: 2021-03-19 | Stop reason: HOSPADM

## 2021-03-19 RX ORDER — SODIUM CHLORIDE 0.9 % (FLUSH) 0.9 %
10 SYRINGE (ML) INJECTION EVERY 12 HOURS SCHEDULED
Status: DISCONTINUED | OUTPATIENT
Start: 2021-03-19 | End: 2021-03-19 | Stop reason: HOSPADM

## 2021-03-19 RX ADMIN — DEXAMETHASONE SODIUM PHOSPHATE 4 MG: 4 INJECTION, SOLUTION INTRA-ARTICULAR; INTRALESIONAL; INTRAMUSCULAR; INTRAVENOUS; SOFT TISSUE at 06:42

## 2021-03-19 RX ADMIN — OXYCODONE HYDROCHLORIDE AND ACETAMINOPHEN 2 TABLET: 7.5; 325 TABLET ORAL at 07:41

## 2021-03-19 RX ADMIN — FENTANYL CITRATE 50 MCG: 50 INJECTION, SOLUTION INTRAMUSCULAR; INTRAVENOUS at 07:12

## 2021-03-19 RX ADMIN — FENTANYL CITRATE 50 MCG: 50 INJECTION, SOLUTION INTRAMUSCULAR; INTRAVENOUS at 07:07

## 2021-03-19 RX ADMIN — PROPOFOL 250 MG: 10 INJECTION, EMULSION INTRAVENOUS at 07:07

## 2021-03-19 RX ADMIN — SCOPALAMINE 1 PATCH: 1 PATCH, EXTENDED RELEASE TRANSDERMAL at 06:42

## 2021-03-19 RX ADMIN — SODIUM CHLORIDE, POTASSIUM CHLORIDE, SODIUM LACTATE AND CALCIUM CHLORIDE 1000 ML: 600; 310; 30; 20 INJECTION, SOLUTION INTRAVENOUS at 06:06

## 2021-03-19 NOTE — ANESTHESIA POSTPROCEDURE EVALUATION
Patient: Shante Negrete    Procedure Summary     Date: 03/19/21 Room / Location:  [facility] 02 /  [facility]    Anesthesia Start: 0704 Anesthesia Stop: 0724    Procedure: BILATERAL MYRINGOTOMY WITH INSERTION OF EAR T-TUBES (Bilateral Ear) Diagnosis:       Chronic mucoid otitis media of both ears      Recurrent acute suppurative otitis media without spontaneous rupture of tympanic membrane of both sides      Dysfunction of both eustachian tubes      Conductive hearing loss, bilateral      (Chronic mucoid otitis media of both ears [H65.33])      (Recurrent acute suppurative otitis media without spontaneous rupture of tympanic membrane of both sides [H66.006])      (Dysfunction of both eustachian tubes [H69.83])      (Conductive hearing loss, bilateral [H90.0])    Surgeons: Lazarus Clark MD Provider: Karli Chambers CRNA    Anesthesia Type: general ASA Status: 2          Anesthesia Type: general    Vitals  Vitals Value Taken Time   /77 03/19/21 0745   Temp 98.3 °F (36.8 °C) 03/19/21 0745   Pulse 78 03/19/21 0745   Resp 16 03/19/21 0745   SpO2 97 % 03/19/21 0745           Post Anesthesia Care and Evaluation    PONV Status: none  Comments: Patient d/c from PACU prior to anes eval based on Kalia score.  Please see RN notes for details of d/c criteria.    Blood pressure 125/95, pulse 72, temperature 98.3 °F (36.8 °C), temperature source Temporal, resp. rate 16, last menstrual period 03/02/2021, SpO2 97 %, not currently breastfeeding.

## 2021-03-19 NOTE — ANESTHESIA PREPROCEDURE EVALUATION
Anesthesia Evaluation     Patient summary reviewed   no history of anesthetic complications:  NPO Solid Status: > 8 hours             Airway   Mallampati: III  TM distance: >3 FB  Neck ROM: full  Possible difficult intubation  Dental      Pulmonary    (-) COPD, asthma, sleep apnea, not a smoker  Cardiovascular   Exercise tolerance: excellent (>7 METS)    (+) hypertension,   (-) pacemaker, past MI, angina, cardiac stents      Neuro/Psych  (-) seizures, TIA, CVA  GI/Hepatic/Renal/Endo    (+) obesity,     (-) GERD, liver disease, no renal disease, diabetes    Musculoskeletal     Abdominal    Substance History      OB/GYN    (-)  Pregnant        Other                        Anesthesia Plan    ASA 2     general     intravenous induction     Anesthetic plan, all risks, benefits, and alternatives have been provided, discussed and informed consent has been obtained with: patient.

## 2021-05-03 NOTE — PROGRESS NOTES
YOB: 1985  Location: [facility]  Location Address: [address] [place] [address], [facility] [address], [address] [place] [address]  Location Phone: [phone]    Chief Complaint   Patient presents with   • Post-op     left ear popping and some clear drainage       History of Present Illness  Shante Negrete is a 36 y.o. female.  Shante Negrete is status post insertion of bilateral T-tubes on 3/19/21. She is having popping of her left ear. She is hearing well and denies ear pain, ear drainage and infection.     I have personally reviewed the information imported into the chart during this visit.      I have personally reviewed the review of systems.      Past Medical History:   Diagnosis Date   • Depression    • Ear infection    • Hearing loss     frequent ear infections   • High blood pressure    • HL (hearing loss)    • Migraines    • Polycystic ovary syndrome    • Rosacea    • Sleep apnea     wears c pap       Past Surgical History:   Procedure Laterality Date   • ADENOIDECTOMY     • BREAST SURGERY     • D & C AND LAPAROSCOPY     • FEMUR FRACTURE SURGERY     • FOOT TENDON SURGERY Left    • KNEE ACL RECONSTRUCTION Left    • MYRINGOTOMY W/ TUBES     • MYRINGOTOMY W/ TUBES Bilateral 3/19/2021    Procedure: BILATERAL MYRINGOTOMY WITH INSERTION OF EAR T-TUBES;  Surgeon: Lazarus Clark MD;  Location: [facility];  Service: ENT;  Laterality: Bilateral;   • TONSILLECTOMY         Outpatient Medications Marked as Taking for the 21 encounter (Office Visit) with Lazarus Clark MD   Medication Sig Dispense Refill   • busPIRone (BUSPAR) 5 MG tablet Take 5 mg by mouth 4 (Four) Times a Day As Needed.     • escitalopram (LEXAPRO) 20 MG tablet Take 20 mg by mouth Daily.     • levonorgestrel-ethinyl estradiol (AVIANE,ALESSE,LESSINA) 0.1-20 MG-MCG per tablet Take 1 tablet by mouth Daily.     • loratadine (Claritin) 10 MG tablet Take 10 mg by mouth Daily.     • metoprolol succinate XL (Toprol XL) 50 MG 24 hr tablet Toprol XL 50 mg  tablet,extended release   Toprol XL 50 mg tablet,extended release 1 Tablet(s) Oral Take once a day     • olmesartan-hydrochlorothiazide (BENICAR HCT) 40-25 MG per tablet Take 1 tablet by mouth Daily.     • [DISCONTINUED] metoprolol succinate XL (TOPROL-XL) 100 MG 24 hr tablet Take 100 mg by mouth Daily.         Gold-containing drug products    Family History   Problem Relation Age of Onset   • Hypertension Father    • Deep vein thrombosis Father        Social History     Socioeconomic History   • Marital status:      Spouse name: Not on file   • Number of children: Not on file   • Years of education: Not on file   • Highest education level: Not on file   Tobacco Use   • Smoking status: Never Smoker   • Smokeless tobacco: Never Used   Vaping Use   • Vaping Use: Never used   Substance and Sexual Activity   • Alcohol use: Yes     Comment: rare   • Drug use: Never   • Sexual activity: Defer       Review of Systems   Constitutional: Negative.    HENT:        Popping of her left ear   Eyes: Negative.    Respiratory: Negative.    Cardiovascular: Negative.    Gastrointestinal: Negative.    Endocrine: Negative.    Genitourinary: Negative.    Musculoskeletal: Negative.    Skin: Negative.    Allergic/Immunologic: Negative.    Neurological: Negative.    Hematological: Negative.    Psychiatric/Behavioral: Negative.        Vitals:    05/04/21 1557   BP: 162/100   Pulse: 106   Temp: 99 °F (37.2 °C)       Body mass index is 33.56 kg/m².    Objective     Physical Exam  CONSTITUTIONAL: well nourished, well-developed, alert, oriented, in no acute distress     COMMUNICATION AND VOICE: able to communicate normally, normal voice quality    HEAD: normocephalic, no lesions, atraumatic, no tenderness, no masses     FACE: appearance normal, no lesions, no tenderness, no deformities, facial motion symmetric    EYES: ocular motility normal, eyelids normal, orbits normal, no proptosis, conjunctiva normal , pupils equal, round      EARS:  Hearing: hearing to conversational voice intact bilaterally   External Ears: normal bilaterally, no lesions  TMs  AS-mild tympanosclerosis with in place and patent T-tube  AD-mild to moderate tympanosclerosis with in place and patent T-tube    NOSE:  External Nose: external nasal structure normal, no tenderness on palpation, no nasal discharge, no lesions, no evidence of trauma, nostrils patent     ORAL:  Lips: upper and lower lips without lesion   OC/OP-clear    NECK:  Inspection and Palpation: neck appearance normal, no masses or tenderness    CHEST/RESPIRATORY: normal respiratory effort     CARDIOVASCULAR: no cyanosis or edema     NEUROLOGICAL/PSYCHIATRIC: oriented to time, place and person, mood normal, affect appropriate, CN II-XII intact grossly    Assessment/Plan   Diagnoses and all orders for this visit:    1. Conductive hearing loss, bilateral (Primary)    2. Dysfunction of both eustachian tubes      * Surgery not found *  No orders of the defined types were placed in this encounter.    Return in about 5 months (around 10/4/2021).       Patient Instructions   Call or return for problems  Audio for follow-up  Follow-up in 5 months

## 2021-05-04 ENCOUNTER — OFFICE VISIT (OUTPATIENT)
Dept: OTOLARYNGOLOGY | Facility: CLINIC | Age: 36
End: 2021-05-04

## 2021-05-04 VITALS
SYSTOLIC BLOOD PRESSURE: 162 MMHG | HEIGHT: 61 IN | TEMPERATURE: 99 F | HEART RATE: 106 BPM | BODY MASS INDEX: 33.78 KG/M2 | WEIGHT: 178.9 LBS | DIASTOLIC BLOOD PRESSURE: 100 MMHG

## 2021-05-04 DIAGNOSIS — H90.0 CONDUCTIVE HEARING LOSS, BILATERAL: Primary | ICD-10-CM

## 2021-05-04 DIAGNOSIS — H69.83 DYSFUNCTION OF BOTH EUSTACHIAN TUBES: ICD-10-CM

## 2021-05-04 PROCEDURE — 99213 OFFICE O/P EST LOW 20 MIN: CPT | Performed by: OTOLARYNGOLOGY

## 2021-05-04 RX ORDER — METOPROLOL SUCCINATE 50 MG/1
TABLET, EXTENDED RELEASE ORAL
COMMUNITY
Start: 2020-11-16 | End: 2022-07-05

## 2021-11-02 ENCOUNTER — OFFICE VISIT (OUTPATIENT)
Dept: OTOLARYNGOLOGY | Facility: CLINIC | Age: 36
End: 2021-11-02

## 2021-11-02 ENCOUNTER — PROCEDURE VISIT (OUTPATIENT)
Dept: OTOLARYNGOLOGY | Facility: CLINIC | Age: 36
End: 2021-11-02

## 2021-11-02 VITALS — WEIGHT: 177.7 LBS | BODY MASS INDEX: 33.55 KG/M2 | HEIGHT: 61 IN | TEMPERATURE: 97.8 F

## 2021-11-02 DIAGNOSIS — Z96.22 S/P MYRINGOTOMY WITH INSERTION OF TUBE: ICD-10-CM

## 2021-11-02 DIAGNOSIS — H90.0 CONDUCTIVE HEARING LOSS, BILATERAL: Primary | ICD-10-CM

## 2021-11-02 DIAGNOSIS — H69.83 DYSFUNCTION OF BOTH EUSTACHIAN TUBES: ICD-10-CM

## 2021-11-02 DIAGNOSIS — H66.006 RECURRENT ACUTE SUPPURATIVE OTITIS MEDIA WITHOUT SPONTANEOUS RUPTURE OF TYMPANIC MEMBRANE OF BOTH SIDES: ICD-10-CM

## 2021-11-02 PROCEDURE — 92567 TYMPANOMETRY: CPT

## 2021-11-02 PROCEDURE — 92557 COMPREHENSIVE HEARING TEST: CPT

## 2021-11-02 PROCEDURE — 99213 OFFICE O/P EST LOW 20 MIN: CPT | Performed by: NURSE PRACTITIONER

## 2021-11-02 RX ORDER — GLIMEPIRIDE 2 MG/1
2 TABLET ORAL 2 TIMES DAILY
COMMUNITY
Start: 2021-08-17

## 2021-11-02 RX ORDER — AZELASTINE 1 MG/ML
2 SPRAY, METERED NASAL 2 TIMES DAILY
Qty: 30 ML | Refills: 11 | Status: SHIPPED | OUTPATIENT
Start: 2021-11-02

## 2021-11-02 RX ORDER — BUPROPION HYDROCHLORIDE 150 MG/1
150 TABLET, EXTENDED RELEASE ORAL 2 TIMES DAILY
COMMUNITY
End: 2022-11-14 | Stop reason: SDUPTHER

## 2021-11-02 RX ORDER — FLUTICASONE PROPIONATE 50 MCG
2 SPRAY, SUSPENSION (ML) NASAL DAILY
Qty: 16 G | Refills: 11 | Status: SHIPPED | OUTPATIENT
Start: 2021-11-02

## 2021-11-02 NOTE — PATIENT INSTRUCTIONS
CONTACT INFORMATION:  The main office phone number is [phone]. For emergencies after hours and on weekends, this number will convert over to our answering service and the on call provider will answer. Please try to keep non emergent phone calls/ questions to office hours 9am-5pm Monday through Friday.      FRM Study Course  As an alternative, you can sign up and use the Epic MyChart system for more direct and quicker access for non emergent questions/ problems.  Connotate allows you to send messages to your doctor, view your test results, renew your prescriptions, schedule appointments, and more. To sign up, go to [employer] and click on the Sign Up Now link in the New User? box. Enter your FRM Study Course Activation Code exactly as it appears below along with the last four digits of your Social Security Number and your Date of Birth () to complete the sign-up process. If you do not sign up before the expiration date, you must request a new code.     FRM Study Course Activation Code: Activation code not generated  Current FRM Study Course Status: Active     If you have questions, you can email [email or web address] or call [phone] to talk to our FRM Study Course staff. Remember, FRM Study Course is NOT to be used for urgent needs. For medical emergencies, dial 911.     IF YOU SMOKE OR USE TOBACCO PLEASE READ THE FOLLOWING:  Why is smoking bad for me?  Smoking increases the risk of heart disease, lung disease, vascular disease, stroke, and cancer. If you smoke, STOP!        IF YOU SMOKE OR USE TOBACCO PLEASE READ THE FOLLOWING:  Why is smoking bad for me?  Smoking increases the risk of heart disease, lung disease, vascular disease, stroke, and cancer. If you smoke, STOP!     For more information:  Quit Now [place]  -QUIT-NOW  https://[email or web address].quitlogix.org/en-[place]/  For the best response, use your nasal sprays every day without skipping doses. It may take several weeks before the full effect is acheived.     Continue  dry ear precautions   Will try nasal sprays to see if popping improves

## 2021-11-02 NOTE — PROGRESS NOTES
FOLLOW-UP AUDIOMETRIC EVALUATION      Name:  Shante Negrete  :  1985  Age:  36 y.o.  Date of Evaluation:  2021       History:  Reason for visit:  Ms. Negrete is seen today at the request of Lazarus Clark MD for a follow-up hearing evaluation. Patient has history of eustachian tube dysfunction, bilateral and conductive hearing loss, bilateral. Patient had bilateral myringotomy with insertion of t-tubes on 2021. [facility]. Patient reports she is doing well. Patient denies otaglia, but reports her left ear pops.      EVALUATION:      RESULTS:    Otoscopic Evaluation:  Bilateral: Minimal cerumen, tympanic membrane visualized, PE tube visualized and cannot tell if tubes are in place         Tympanometry (226 Hz):  Bilateral: Type B- large ear canal volume    Test technique:  Conventional Audiometry via inserts    Reliability:   good    Pure Tone Audiometry:    Bilateral: mild relatively flat conductive hearing loss          Speech Audiometry:   Right: Speech Reception Threshold (SRT) was obtained at 20 dBHL  Word Recognition scores- excellent or within normal limits (90 - 100%)   Presented at 60 dBHL, with 20 dBHL S/N ratio. NU-6 List 3A, 25 words  Left: Speech Reception Threshold (SRT) was obtained at 25 dBHL  Word Recognition scores- excellent or within normal limits (90 - 100%)   Presented at 65 dBHL, with 20 dBHL S/N ratio. NU-6 List 3A, 25 words      IMPRESSIONS:  Tympanometry showed a large ear canal volume, consistent with a tympanic membrane perforation or a patent PE tube, for both ears. Pure tone thresholds for both ears show a relatively flat mild conductive hearing loss, suggesting abnormal outer/middle ear function and normal cochlear/retrocochlear function. Results are very similar to audio completed in 2021. Patient was counseled with regard to the findings.    Diagnosis:   1. Conductive hearing loss, bilateral    2.  S/P myringotomy with insertion of tube        RECOMMENDATIONS/PLAN:  Follow-up recommendations per VIRGIL Rogers      EDUCATION:  Discussed results and recommendations with patient. Questions were addressed and the patient was encouraged to contact our department should concerns arise.      ADILSON Jean Baptiste  Licensed Audiologist

## 2021-11-02 NOTE — PROGRESS NOTES
VIRGIL Bishop     Chief Complaint   Patient presents with   • Follow-up     RECHECK EARS, LEFT POPPING          HPI   Shante Negrete is a  36 y.o. female presents for follow up. She is status post insertion of bilateral T-tubes on 3/19/21  popping and cracking of the ear. The symptoms are localized to the left side and occur a couple times per day. She does report improvement in hearing. The patient has had mild symptoms. The symptoms have been intermittant for the last several months. There have been no identified factors that aggravate the symptoms. There have been no factors that have improved the symptoms. She denies pain and has only had one episode of drainage from the left ear which has resolved       Review of Systems   Constitutional: Negative.    HENT: Negative for ear pain.         Left ear popping      Eyes: Negative.    Respiratory: Negative.    Cardiovascular: Negative.    Gastrointestinal: Negative.    Genitourinary: Negative.       I have reviewed the ROS as documented by the MA/LACEY/RN VIRGIL Bishop    History     Last Reviewed by Dilia Rogers APRN on 11/2/2021 at  3:45 PM    Sections Reviewed    Medical, Family, Tobacco, Surgical      Problem list reviewed by Dilia Rogers APRN on 11/2/2021 at  3:45 PM  Medicines reviewed by Dilia Rogers APRN on 11/2/2021 at  3:45 PM  Allergies reviewed by Dilia Rogers APRN on 11/2/2021 at  3:45 PM          Vital Signs:   Temp:  [97.8 °F (36.6 °C)] 97.8 °F (36.6 °C)    Physical Exam  Vitals and nursing note reviewed.   Constitutional:       Appearance: Normal appearance. She is obese.   HENT:      Head: Normocephalic.      Right Ear: External ear normal. A PE tube (t tube dry and patent ) is present. Tympanic membrane is scarred.      Left Ear: External ear normal. A PE tube (t tube dry and patent ) is present. Tympanic membrane is scarred.      Nose: Nose normal.      Mouth/Throat:      Lips: Pink.      Mouth: Mucous membranes are moist.    Musculoskeletal:      Cervical back: Full passive range of motion without pain and normal range of motion.   Lymphadenopathy:      Cervical: No cervical adenopathy.   Neurological:      Mental Status: She is alert.           RESULTS REVIEW:    The following results/records were reviewed:      Procedure visit with Lakisha Acosta AUD (11/02/2021)     Assessment/Plan     Diagnoses and all orders for this visit:    1. Conductive hearing loss, bilateral (Primary)    2. Dysfunction of both eustachian tubes    3. Recurrent acute suppurative otitis media without spontaneous rupture of tympanic membrane of both sides    4. S/P myringotomy with insertion of tube    Other orders  -     fluticasone (FLONASE) 50 MCG/ACT nasal spray; 2 sprays into the nostril(s) as directed by provider Daily.  Dispense: 16 g; Refill: 11  -     azelastine (ASTELIN) 0.1 % nasal spray; 2 sprays into the nostril(s) as directed by provider 2 (Two) Times a Day. Use in each nostril as directed  Dispense: 30 mL; Refill: 11          Continue current management plan.will try nasal sprays to see if popping will improve     Call for ear problems, especially change of hearing, ear pain or dizziness.  For the best results, use nasal sprays every day. It may take a week to build up in the nasal lining and a few more weeks to improve the eustachian tube function.  Protect getting water in the ears. If needed, may use over the counter silicone plugs or a cotton ball coated with vasoline when bathing.  Use hairdryer on a cool setting after bathing.  Use hearing protection in loud noise situations.      Return in about 6 months (around 5/2/2022) for Recheck ears .       Dilia Rogers, VIRGIL  11/2/2021  15:48 CDT

## 2021-11-22 ENCOUNTER — TELEPHONE (OUTPATIENT)
Dept: OTOLARYNGOLOGY | Facility: CLINIC | Age: 36
End: 2021-11-22

## 2021-11-22 RX ORDER — CIPROFLOXACIN AND DEXAMETHASONE 3; 1 MG/ML; MG/ML
4 SUSPENSION/ DROPS AURICULAR (OTIC) 2 TIMES DAILY
Qty: 7.5 ML | Refills: 0 | Status: SHIPPED | OUTPATIENT
Start: 2021-11-22 | End: 2022-05-06 | Stop reason: SDUPTHER

## 2021-11-22 NOTE — TELEPHONE ENCOUNTER
Caller: Shante Negrete    Relationship to patient: Self    Best call back number: [phone]; ANYTIME; OK TO [facility]    Chief complaint: PT HAS AN ACTIVE EAR INFECTION. PER PT NEEDS APPT [facility] TO POSSIBLY DO ANOTHER   BILATERAL MYRINGOTOMY WITH INSERTION OF EAR T-TUBES PROCEDURE    Type of visit: FOLLOW-UP APPT

## 2022-05-06 ENCOUNTER — TELEPHONE (OUTPATIENT)
Dept: OTOLARYNGOLOGY | Facility: CLINIC | Age: 37
End: 2022-05-06

## 2022-05-06 RX ORDER — AMOXICILLIN 500 MG/1
500 CAPSULE ORAL 3 TIMES DAILY
Qty: 30 CAPSULE | Refills: 0 | Status: SHIPPED | OUTPATIENT
Start: 2022-05-06 | End: 2022-07-05

## 2022-05-06 RX ORDER — CIPROFLOXACIN AND DEXAMETHASONE 3; 1 MG/ML; MG/ML
4 SUSPENSION/ DROPS AURICULAR (OTIC) 2 TIMES DAILY
Qty: 7.5 ML | Refills: 0 | Status: SHIPPED | OUTPATIENT
Start: 2022-05-06 | End: 2022-07-05

## 2022-06-03 ENCOUNTER — OFFICE VISIT (OUTPATIENT)
Dept: NEUROLOGY | Age: 37
End: 2022-06-03
Payer: COMMERCIAL

## 2022-06-03 VITALS
DIASTOLIC BLOOD PRESSURE: 91 MMHG | OXYGEN SATURATION: 98 % | BODY MASS INDEX: 32.39 KG/M2 | HEIGHT: 60 IN | SYSTOLIC BLOOD PRESSURE: 136 MMHG | WEIGHT: 165 LBS | HEART RATE: 88 BPM

## 2022-06-03 DIAGNOSIS — Z99.89 CPAP (CONTINUOUS POSITIVE AIRWAY PRESSURE) DEPENDENCE: ICD-10-CM

## 2022-06-03 DIAGNOSIS — G47.33 OBSTRUCTIVE SLEEP APNEA: Primary | ICD-10-CM

## 2022-06-03 PROCEDURE — 99213 OFFICE O/P EST LOW 20 MIN: CPT | Performed by: PHYSICIAN ASSISTANT

## 2022-06-03 RX ORDER — LORATADINE 10 MG/1
10 TABLET ORAL DAILY
COMMUNITY

## 2022-06-03 RX ORDER — LABETALOL 100 MG/1
100 TABLET, FILM COATED ORAL 2 TIMES DAILY
COMMUNITY

## 2022-06-03 RX ORDER — BUPROPION HYDROCHLORIDE 150 MG/1
TABLET, EXTENDED RELEASE ORAL EVERY 12 HOURS SCHEDULED
COMMUNITY

## 2022-06-03 RX ORDER — GLIMEPIRIDE 2 MG/1
TABLET ORAL
COMMUNITY
Start: 2021-08-17

## 2022-06-03 NOTE — LETTER
[id number] [facility]  [address] [place] [address], [address] [place] [address]  Phone [phone]  Fax [phone]             Re:  Miriam Ismael    22  :  1985  Address: [place]  [address]       Replinishible PAP Supplies, 1 year supply  Item HPCPS Code Frequency   Mask of choice  or  1 per 3 months   Nasal Mask cushion/pillows  or  2 per 30 days   Full Face Mask Interface  1 per 30 days   Headgear  1 per 6 months   Tubing, length of choice  or  1 per 3 months   Water Chamber  1 per 6 months   Chinstrap  1 per 6 months   Disposable Filters  2 per 30 days   Reusable Filters  1 per 6 months     Diagnoses:  Obstructive sleep apnea (G47.33)  Length of Need: Lifetime, 99    Ordering Provider: Jose Quesada PA-C  NPI:  [id number]        Signature: [unfilled]        Date: 6/3/2022      Electronically Signed by Jose Quesada PA-C  on 6/3/2022 at 10:26 AM

## 2022-06-03 NOTE — PATIENT INSTRUCTIONS

## 2022-06-03 NOTE — PROGRESS NOTES
[address] [facility]  [address] [place] [address] Drive, [address] [place] [address],[address]  Concepcion Oconnell  Phone [phone]  Fax [phone]       [facility] Sleep Follow Up Encounter      Information:   Patient Name: Divina Jenkins  :   1985  Age:   40 y.o. MRN:   [id number]  Account #:  [de-identified]  Today:                6/3/22    Provider:  Hien York PA-C    Chief Complaint   Patient presents with    Sleep Apnea     follow up        Subjective:   Rhina Little is a 40 y.o. female  with a history of severe RUBY who comes in for a sleep clinic follow up. She was diagnosed with RUBY several years ago and used CPAP. Her device broke and she had an HST. The HST, 2019 revealed an AHI of 89.8. She is prescribed auto CPAP therapy with a pressure range of 8cm to 20cm. The compliance report indicates that she is averaging 7 hours of PAP use per day, but hours >4= 57%. The nasal mask irritates the rosacea. She needs a FFM. Her headgear broke and she hasn't been able to use it consistently. It falls off when sleeping. She reports that consistent use alleviates the daytime somnolence.      Location or symptom:  RUBY  Onset:  Initial dx: years ago; most recent HST, 2019   Timing:  q hs  Severity:  Severe  Associated:  Snoring, witnessed apneas, and excessive daytime somnolence  Alleviated:  CPAP      Objective:     Past Medical History:   Diagnosis Date    CPAP (continuous positive airway pressure) dependence     8cm to 20cm    Obstructive sleep apnea     AHI: 89.8 per HST, 2019    PCOS (polycystic ovarian syndrome)     Sleep difficulties        Past Surgical History:   Procedure Laterality Date    ANKLE SURGERY      left ankle tendon repair    ANTERIOR CRUCIATE LIGAMENT REPAIR      left    BREAST REDUCTION SURGERY      right and submuscular implant on the left due to asymmetry    FEMUR SURGERY      left, has phillip and screws    TYMPANOSTOMY TUBE PLACEMENT      x 17       Recent Hospitalizations  ·     Significant Injuries  ·     Family History   Problem Relation Age of Onset    Colon Cancer Brother     Heart Disease Maternal Grandmother     Diabetes Maternal Grandfather     Heart Disease Maternal Grandfather     Lupus Paternal Grandmother     Heart Disease Paternal Grandfather          maker       Social History  Social History     Tobacco Use   Smoking Status Never Smoker   Smokeless Tobacco Never Used     Social History     Substance and Sexual Activity   Alcohol Use No     Social History     Substance and Sexual Activity   Drug Use No         Current Outpatient Medications   Medication Sig Dispense Refill    buPROPion (WELLBUTRIN SR) 150 MG extended release tablet every 12 hours      loratadine (CLARITIN) 10 MG tablet Take 10 mg by mouth daily      glimepiride (AMARYL) 2 MG tablet       labetalol (NORMODYNE) 100 MG tablet Take 100 mg by mouth 2 times daily      busPIRone (BUSPAR) 5 MG tablet Take 5 mg by mouth 4 times daily as needed      Multiple Vitamin (MULTIVITAMIN PO) Take  by mouth.  Norgestim-Eth Estrad Triphasic (ORTHO TRI-CYCLEN LO) 0.18/0.215/0.25 MG-25 MCG TABS Take 1 tablet by mouth daily (Patient not taking: Reported on 6/3/2022)      escitalopram (LEXAPRO) 20 MG tablet Take 20 mg by mouth daily (Patient not taking: Reported on 6/3/2022)       No current facility-administered medications for this visit. Allergies:  Gold-containing drug products    REVIEW OF SYSTEMS     Constitutional: []? Fever []? Sweats []? Chills []? Recent Injury   [x]? Denies all unless marked  HENT:[]? Headache  []? Head Injury  []? Sore Throat  []? Ear Pain  []? Dizziness []? Hearing Loss   [x]? Denies all unless marked  Musculoskeletal: []? Arthralgia  []? Myalgias []? Muscle cramps  []? Muscle twitches   [x]? Denies all unless marked   Spine:  []? Neck pain  []? Back pain  []? Sciaticia  [x]? Denies all unless marked  Neurological:[]? Visual Disturbance []? Double Vision []? Slurred Speech []?  Trouble swallowing  []? Vertigo []? Tingling []? Numbness []? Weakness []? Loss of Balance   []? Loss of Consciousness []? Memory Loss []? Seizures  [x]? Denies all unless marked  Psychiatric/Behavioral:[]? Depression []? Anxiety  [x]? Denies all unless marked  Sleep: []? Insomnia []? Sleep Disturbance []? Snoring []? Restless Legs []? Daytime Sleepiness [x]? Sleep Apnea  []? Denies all unless marked    The MA has completed the ROS with the patient. I have reviewed it in its' entirety with the patient and agree with the documentation. PHYSICAL EXAM  BP (!) 136/91 Comment: pt has stopped taking her meds but started back on 06/02/202  Pulse 88   Ht 5' (1.524 m)   Wt 165 lb (74.8 kg)   SpO2 98%   Breastfeeding No   BMI 32.22 kg/m²     Constitutional -  Alert in NAD, well developed, pleasant and cooperative with exam  HEENT- Conjunctiva normal.  No scars, masses, or lesions over external nose or ears, hearing intact, no neck masses noted, no jugular vein distension, no bruit  Cardiac- Regular rate and rhythm  Pulmonary- Clear to auscultation, good expansion, normal effort without use of accessory muscles  Musculoskeletal - No significant wasting of muscles noted. No bony deformities  Extremities - No clubbing, cyanosis or edema  Skin - Warm, dry, and intact.   No rash, erythema, or pallor  Psychiatric - Mood, affect, and behavior appear normal      Neurological exam  Awake, alert, fluent oriented  appropriate affect  Attention and concentration appear appropriate  Recent and remote memory appears unremarkable  Speech normal without dysarthria  No clear issues with language of fund of knowledge    Cranial Nerve Exam     CN III, IV,VI-EOMI, No nystagmus, conjugate eye movements, no ptosis  CN VII-No facial assymetry    Motor Exam    Antigravity throughout upper and lower extremities bilaterally    Tremors and coordination    No tremors in hands or head noted     Gait    Normal base and speed  No ataxia    I reviewed the following studies:       []  :  Clinical laboratory test results     []  :  Radiology reports                    [x]  :  Review and summarization of medical records-compliance report      []     Request for medical records       []  :  Reviewed previous/recent polysomnogram report(s)      []  :  [place] Sleepiness Scale       [x]  :  Compliance report :  The auto CPAP is set at a pressure range of 8cm to 20cm. Compliance download shows that she uses device: 59% of the time;  percentage of days with usage >=4 hours: 57%. AHI: 2.5    Assessment:       ICD-10-CM    1. Obstructive sleep apnea  G47.33    2. CPAP (continuous positive airway pressure) dependence  Z99.89           []  :  Stable     [x]  :  Improved-unable to use CPAP consistently because the head gear strap broke; needs to obtain supplies;                       []  :  Well controlled              []  :  Resolving     []  :  Resolved     []  :  Inadequately controlled     []  :  Worsening     []  :  Additional workup planned        Plan:     No orders of the defined types were placed in this encounter. 1.   Reminded of the risks of RUBY that  may include, but are not limited to  hypertension, coronary artery disease, atrial fibrillation, CHF, diabetes, stroke, weight gain, impaired cognition, daytime somnolence, and motor vehicle accidents. Advised to abstain from driving or operating heavy machinery when drowsy and the use of respiratory suppressants. Discussed diagnostic studies and potential treatment plan. 2.  Will evaluate for PAP clinical benefit and and compliance during a 30 day period within the preceding 90 days PRN. 3.  The following educational material has been included in this visit after visit summary for your review: RUBY/PAP guidelines-Discussed with the patient and all questions fully answered. 4.  Continue PAP  therapy. The patient voices understanding and recognizes the need for adherence to the prescribed therapy  5. [facility]  6. Monitor B/P and follow up with PCP if it persists to be elevated or symptomatic/ER  7.   Follow up in 1 year       Replinishible PAP Supplies, 1 year supply  Item HPCPS Code Frequency   Mask of choice  or  1 per 3 months   Nasal Mask cushion/pillows  or  2 per 30 days   Full Face Mask Interface  1 per 30 days   Headgear  1 per 6 months   Tubing, length of choice  or  1 per 3 months   Water Chamber  1 per 6 months   Chinstrap  1 per 6 months   Disposable Filters  2 per 30 days   Reusable Filters  1 per 6 months     Diagnoses:  Obstructive sleep apnea (G47.33)  Length of Need: Lifetime, 99    Ordering Provider: Barry Miguel PA-C  NPI:  [id number]

## 2022-06-06 ENCOUNTER — TELEPHONE (OUTPATIENT)
Dept: OTOLARYNGOLOGY | Facility: CLINIC | Age: 37
End: 2022-06-06

## 2022-06-06 NOTE — TELEPHONE ENCOUNTER
Caller: MALIKA ZHANG    Relationship to patient: SELF     Best call back number: [phone]    Patient is needing: PT CALLED TO SEE IF SHE COULD BE SEEN SOONER THAN September APPT. SHE'S HAD ONE TUBE FALL OUT AND THEN THE OPPOSITE EAR IS NOW DRAINING WITH BLOOD. WOULD LIKE TO SPEAK WITH SOMEONE.    HUB UNABLE TO WARM TX TO CLINICAL.

## 2022-06-06 NOTE — PROGRESS NOTES
YOB: 1985  Location: [facility]  Location Address: [address] [place] [address], [facility], [address] [place] [address]  Location Phone: [phone]    Chief Complaint   Patient presents with   • Ear Problem     Tube in right ear fell out last week, and there is blood and drainage in the left ear as of 2 days ago.    • Ear Fullness       History of Present Illness  Shante ZHANG is a 37 y.o. female.  Shante ZHANG is here for follow up of ENT complaints. The patient is status post BMT on 3/19/21. She is having bleeding from the left ear since Saturday. She is having pain and congestion.   She states her right tube fell out approximately a week ago.  She has not been using drops in the ear in the past several weeks.   She feels as if her hearing is muffled     Patient is currently 5 weeks pregnant    Past Medical History:   Diagnosis Date   • Depression    • Diabetic acidosis, type II (HCC)    • Ear infection    • Hearing loss     frequent ear infections   • High blood pressure    • HL (hearing loss)    • Migraines    • Polycystic ovary syndrome    • Rosacea    • Sleep apnea     wears c pap       Past Surgical History:   Procedure Laterality Date   • ADENOIDECTOMY     • BREAST SURGERY     • D & C AND LAPAROSCOPY     • FEMUR FRACTURE SURGERY     • FOOT TENDON SURGERY Left    • KNEE ACL RECONSTRUCTION Left    • MYRINGOTOMY W/ TUBES     • MYRINGOTOMY W/ TUBES Bilateral 3/19/2021    Procedure: BILATERAL MYRINGOTOMY WITH INSERTION OF EAR T-TUBES;  Surgeon: Lazarus Clark MD;  Location: [facility];  Service: ENT;  Laterality: Bilateral;   • TONSILLECTOMY         Outpatient Medications Marked as Taking for the 22 encounter (Office Visit) with Lazarus Clark MD   Medication Sig Dispense Refill   • buPROPion SR (WELLBUTRIN SR) 150 MG 12 hr tablet Every 12 (Twelve) Hours.     • busPIRone (BUSPAR) 5 MG tablet Take 5 mg by mouth 4 (Four) Times a Day As Needed.     • fluticasone (FLONASE) 50 MCG/ACT nasal spray 2 sprays  into the nostril(s) as directed by provider Daily. 16 g 11   • glimepiride (AMARYL) 2 MG tablet      • labetalol (NORMODYNE) 100 MG tablet Take 50 mg by mouth 2 (Two) Times a Day.     • levocetirizine (XYZAL) 5 MG tablet Take 5 mg by mouth Every Evening.         Gold-containing drug products    Family History   Problem Relation Age of Onset   • Hypertension Father    • Deep vein thrombosis Father        Social History     Socioeconomic History   • Marital status:    Tobacco Use   • Smoking status: Never Smoker   • Smokeless tobacco: Never Used   Vaping Use   • Vaping Use: Never used   Substance and Sexual Activity   • Alcohol use: Not Currently     Comment: rare   • Drug use: Never   • Sexual activity: Defer       Review of Systems   Constitutional: Negative.    HENT: Positive for congestion, ear discharge, ear pain, hearing loss and rhinorrhea.    Eyes: Negative.    Respiratory: Negative.    Cardiovascular: Negative.    Gastrointestinal: Negative.    Endocrine: Negative.    Genitourinary: Negative.    Musculoskeletal: Negative.    Skin: Negative.    Allergic/Immunologic: Positive for environmental allergies.   Neurological: Negative.    Hematological: Negative.    Psychiatric/Behavioral: Negative.        Vitals:    06/07/22 1437   BP: 155/85   Pulse: 82   Resp: 16   Temp: 98.2 °F (36.8 °C)       Body mass index is 32.03 kg/m².    Objective     Physical Exam  Vitals reviewed.   Constitutional:       Appearance: She is obese.   HENT:      Head: Normocephalic.      Right Ear: Tympanic membrane is scarred.      Left Ear: External ear normal.      Ears:        Comments: Right no pe tube   Left t tube with minimal blood tinged drainage noted     Neurological:      Mental Status: She is alert.         Assessment & Plan   Diagnoses and all orders for this visit:    1. S/P myringotomy with insertion of tube (Primary)    2. Conductive hearing loss, bilateral    3. Dysfunction of both eustachian tubes    4. Chronic  mucoid otitis media of both ears    5. Recurrent acute suppurative otitis media without spontaneous rupture of tympanic membrane of both sides      * Surgery not found *  No orders of the defined types were placed in this encounter.    Return in about 9 months (around 3/7/2023) for Recheck.     Will continue to monitor at this time   Discussed possibility of eustachian tube dilation and possible replacement of right t tube after pregnancy   Continue nasal sprays and allergy medications       Patient Instructions   CONTACT INFORMATION:  The main office phone number is [phone]. For emergencies after hours and on weekends, this number will convert over to our answering service and the on call provider will answer. Please try to keep non emergent phone calls/ questions to office hours 9am-5pm Monday through Friday.      [employer]  As an alternative, you can sign up and use the Epic MyChart system for more direct and quicker access for non emergent questions/ problems.  [facility] [employer] allows you to send messages to your doctor, view your test results, renew your prescriptions, schedule appointments, and more. To sign up, go to [employer] and click on the Sign Up Now link in the New User? box. Enter your [employer] Activation Code exactly as it appears below along with the last four digits of your Social Security Number and your Date of Birth () to complete the sign-up process. If you do not sign up before the expiration date, you must request a new code.     [employer] Activation Code: Activation code not generated  Current [employer] Status: Active     If you have questions, you can email flux - [email or web address] or call [phone] to talk to our [employer] staff. Remember, [employer] is NOT to be used for urgent needs. For medical emergencies, dial 911.     IF YOU SMOKE OR USE TOBACCO PLEASE READ THE FOLLOWING:  Why is smoking bad for me?  Smoking increases the risk of heart disease, lung disease,  vascular disease, stroke, and cancer. If you smoke, STOP!        IF YOU SMOKE OR USE TOBACCO PLEASE READ THE FOLLOWING:  Why is smoking bad for me?  Smoking increases the risk of heart disease, lung disease, vascular disease, stroke, and cancer. If you smoke, STOP!     For more information:  [employer]uit Now [place]  1-800-QUIT-NOW  https://[place].quitlogix.org/en-US/ For the best response, use your nasal sprays every day without skipping doses. It may take several weeks before the full effect is acheived.

## 2022-06-07 ENCOUNTER — OFFICE VISIT (OUTPATIENT)
Dept: OTOLARYNGOLOGY | Facility: CLINIC | Age: 37
End: 2022-06-07

## 2022-06-07 VITALS
TEMPERATURE: 98.2 F | BODY MASS INDEX: 32.2 KG/M2 | HEART RATE: 82 BPM | WEIGHT: 164 LBS | HEIGHT: 60 IN | DIASTOLIC BLOOD PRESSURE: 85 MMHG | RESPIRATION RATE: 16 BRPM | SYSTOLIC BLOOD PRESSURE: 155 MMHG

## 2022-06-07 DIAGNOSIS — H69.83 DYSFUNCTION OF BOTH EUSTACHIAN TUBES: ICD-10-CM

## 2022-06-07 DIAGNOSIS — H90.0 CONDUCTIVE HEARING LOSS, BILATERAL: ICD-10-CM

## 2022-06-07 DIAGNOSIS — Z96.22 S/P MYRINGOTOMY WITH INSERTION OF TUBE: Primary | ICD-10-CM

## 2022-06-07 DIAGNOSIS — H66.006 RECURRENT ACUTE SUPPURATIVE OTITIS MEDIA WITHOUT SPONTANEOUS RUPTURE OF TYMPANIC MEMBRANE OF BOTH SIDES: ICD-10-CM

## 2022-06-07 DIAGNOSIS — H65.33 CHRONIC MUCOID OTITIS MEDIA OF BOTH EARS: ICD-10-CM

## 2022-06-07 PROCEDURE — 99213 OFFICE O/P EST LOW 20 MIN: CPT | Performed by: NURSE PRACTITIONER

## 2022-06-07 RX ORDER — LABETALOL 100 MG/1
200 TABLET, FILM COATED ORAL 2 TIMES DAILY
COMMUNITY

## 2022-06-07 RX ORDER — LEVOCETIRIZINE DIHYDROCHLORIDE 5 MG/1
5 TABLET, FILM COATED ORAL EVERY EVENING
COMMUNITY

## 2022-06-07 NOTE — PATIENT INSTRUCTIONS
CONTACT INFORMATION:  The main office phone number is [phone]. For emergencies after hours and on weekends, this number will convert over to our answering service and the on call provider will answer. Please try to keep non emergent phone calls/ questions to office hours 9am-5pm Monday through Friday.      [employer]  As an alternative, you can sign up and use the Epic MyChart system for more direct and quicker access for non emergent questions/ problems.  [employer] allows you to send messages to your doctor, view your test results, renew your prescriptions, schedule appointments, and more. To sign up, go to [employer] and click on the Sign Up Now link in the New User? box. Enter your [employer] Activation Code exactly as it appears below along with the last four digits of your Social Security Number and your Date of Birth () to complete the sign-up process. If you do not sign up before the expiration date, you must request a new code.     [employer] Activation Code: Activation code not generated  Current [employer] Status: Active     If you have questions, you can email [email or web address] or call [phone] to talk to our [employer] staff. Remember, [employer] is NOT to be used for urgent needs. For medical emergencies, dial 911.     IF YOU SMOKE OR USE TOBACCO PLEASE READ THE FOLLOWING:  Why is smoking bad for me?  Smoking increases the risk of heart disease, lung disease, vascular disease, stroke, and cancer. If you smoke, STOP!        IF YOU SMOKE OR USE TOBACCO PLEASE READ THE FOLLOWING:  Why is smoking bad for me?  Smoking increases the risk of heart disease, lung disease, vascular disease, stroke, and cancer. If you smoke, STOP!     For more information:  Quit Now [place]  -QUIT-NOW  https://[place].quitlogix.org/en-US/ For the best response, use your nasal sprays every day without skipping doses. It may take several weeks before the full effect is acheived.

## 2022-06-21 ENCOUNTER — TELEPHONE (OUTPATIENT)
Dept: OTOLARYNGOLOGY | Facility: CLINIC | Age: 37
End: 2022-06-21

## 2022-06-21 NOTE — TELEPHONE ENCOUNTER
Patient is having D&C today. We will give her a couple of weeks to recuperated and schedule surgery.

## 2022-06-21 NOTE — TELEPHONE ENCOUNTER
----- Message from VIRGIL Rogers sent at 6/20/2022  4:58 PM CDT -----  Regarding: RE:  Shouldn’t need to  I would just confirm and make sure she’s not scheduled for d and c  ----- Message -----  From: Shante Horner RN  Sent: 6/20/2022   4:45 PM CDT  To: VIRGIL Rogers    She called and patient has had a miscarriage. She wants to schedule surgery. Do I need to do anything prior due to miscarriage? I do not do OB!

## 2022-06-28 DIAGNOSIS — H69.83 DYSFUNCTION OF BOTH EUSTACHIAN TUBES: ICD-10-CM

## 2022-06-28 DIAGNOSIS — H65.33 CHRONIC MUCOID OTITIS MEDIA OF BOTH EARS: ICD-10-CM

## 2022-06-28 DIAGNOSIS — Z96.22 S/P MYRINGOTOMY WITH INSERTION OF TUBE: Primary | ICD-10-CM

## 2022-07-05 ENCOUNTER — ANESTHESIA EVENT (OUTPATIENT)
Dept: PERIOP | Facility: HOSPITAL | Age: 37
End: 2022-07-05

## 2022-07-05 ENCOUNTER — PRE-ADMISSION TESTING (OUTPATIENT)
Dept: PREADMISSION TESTING | Facility: HOSPITAL | Age: 37
End: 2022-07-05

## 2022-07-05 ENCOUNTER — LAB (OUTPATIENT)
Dept: LAB | Facility: HOSPITAL | Age: 37
End: 2022-07-05

## 2022-07-05 VITALS
OXYGEN SATURATION: 99 % | RESPIRATION RATE: 18 BRPM | SYSTOLIC BLOOD PRESSURE: 141 MMHG | WEIGHT: 169.75 LBS | DIASTOLIC BLOOD PRESSURE: 71 MMHG | HEART RATE: 88 BPM | BODY MASS INDEX: 33.33 KG/M2 | HEIGHT: 60 IN

## 2022-07-05 DIAGNOSIS — H69.83 DYSFUNCTION OF BOTH EUSTACHIAN TUBES: ICD-10-CM

## 2022-07-05 DIAGNOSIS — H65.33 CHRONIC MUCOID OTITIS MEDIA OF BOTH EARS: ICD-10-CM

## 2022-07-05 DIAGNOSIS — Z96.22 S/P MYRINGOTOMY WITH INSERTION OF TUBE: ICD-10-CM

## 2022-07-05 LAB — SARS-COV-2 ORF1AB RESP QL NAA+PROBE: NOT DETECTED

## 2022-07-05 PROCEDURE — 93005 ELECTROCARDIOGRAM TRACING: CPT

## 2022-07-05 PROCEDURE — 93010 ELECTROCARDIOGRAM REPORT: CPT | Performed by: INTERNAL MEDICINE

## 2022-07-05 PROCEDURE — C9803 HOPD COVID-19 SPEC COLLECT: HCPCS

## 2022-07-05 PROCEDURE — U0004 COV-19 TEST NON-CDC HGH THRU: HCPCS

## 2022-07-05 NOTE — DISCHARGE INSTRUCTIONS
Before you come to the hospital        Arrival time: AS DIRECTED BY OFFICE     YOU MAY TAKE THE FOLLOWING MEDICATION(S) THE MORNING OF SURGERY WITH A SIP OF WATER: buspar and labetolol            ALL OTHER HOME MEDICATION CHECK WITH YOUR PHYSICIAN (especially if you are taking diabetes medicines or blood thinners)    Do not take within 24 hours of anesthesia, per anesthesia: Benicar       If you were given and instructed to use a germ- killing soap, use as directed the night before surgery and the morning of surgery before coming to the hospital.             Eating and drinking restrictions prior to scheduled arrival time    2 Hours before arrival time STOP   Drinking Clear liquids (water, apple juice-no pulp)     6 Hours before arrival time STOP   Milk or drinks that contain milk, full liquids    6 Hours before arrival time STOP   Light meals or foods, such as toast or cereal    8 Hours before arrival time STOP   Heavy foods, such as meat, fried foods, or fatty foods    (It is extremely important that you follow these guidelines to prevent delay or cancelation of your procedure)     Clear Liquids  Water and flavored water                                                                      Clear Fruit juices, such as cranberry juice and apple juice.  Black coffee (NO cream of any kind, including powdered).  Plain tea  Clear bouillon or broth.  Flavored gelatin.  Soda.  Gatorade or Powerade.  Full liquid examples  Juices that have pulp.  Frozen ice pops that contain fruit pieces.  Coffee with creamer  Milk.  Yogurt.              MANAGING PAIN AFTER SURGERY    We know you are probably wondering what your pain will be like after surgery.  Following surgery it is unrealistic to expect you will not have pain.   Pain is how our bodies let us know that something is wrong or cautions us to be careful.  That said, our goal is to make your pain tolerable.    Methods we may use to treat your pain include (oral or IV  medications, PCAs, epidurals, nerve blocks, etc.)   While some procedures require IV pain medications for a short time after surgery, transitioning to pain medications by mouth allows for better management of pain.   Your nurse will encourage you to take oral pain medications whenever possible.  IV medications work almost immediately, but only last a short while.  Taking medications by mouth allows for a more constant level of medication in your blood stream for a longer period of time.      Once your pain is out of control it is harder to get back under control.  It is important you are aware when your next dose of pain medication is due.  If you are admitted, your nurse may write the time of your next dose on the white board in your room to help you remember.      We are interested in your pain and encourage you to inform us about aggravating factors during your visit.   Many times a simple repositioning every few hours can make a big difference.    If your physician says it is okay, do not let your pain prevent you from getting out of bed. Be sure to call your nurse for assistance prior to getting up so you do not fall.      Before surgery, please decide your tolerable pain goal.  These faces help describe the pain ratings we use on a 0-10 scale.   Be prepared to tell us your goal and whether or not you take pain or anxiety medications at home.

## 2022-07-06 ENCOUNTER — ANESTHESIA (OUTPATIENT)
Dept: PERIOP | Facility: HOSPITAL | Age: 37
End: 2022-07-06

## 2022-07-06 ENCOUNTER — HOSPITAL ENCOUNTER (OUTPATIENT)
Facility: HOSPITAL | Age: 37
Setting detail: HOSPITAL OUTPATIENT SURGERY
Discharge: HOME | End: 2022-07-06
Attending: OTOLARYNGOLOGY | Admitting: OTOLARYNGOLOGY

## 2022-07-06 VITALS
TEMPERATURE: 98.1 F | RESPIRATION RATE: 18 BRPM | OXYGEN SATURATION: 97 % | HEART RATE: 92 BPM | DIASTOLIC BLOOD PRESSURE: 78 MMHG | SYSTOLIC BLOOD PRESSURE: 118 MMHG

## 2022-07-06 DIAGNOSIS — H65.33 CHRONIC MUCOID OTITIS MEDIA OF BOTH EARS: ICD-10-CM

## 2022-07-06 DIAGNOSIS — H69.83 DYSFUNCTION OF BOTH EUSTACHIAN TUBES: ICD-10-CM

## 2022-07-06 DIAGNOSIS — Z96.22 S/P MYRINGOTOMY WITH INSERTION OF TUBE: ICD-10-CM

## 2022-07-06 LAB
GLUCOSE BLDC GLUCOMTR-MCNC: 143 MG/DL (ref 70–130)
GLUCOSE BLDC GLUCOMTR-MCNC: 146 MG/DL (ref 70–130)
HCG INTACT+B SERPL-ACNC: 90.43 MIU/ML

## 2022-07-06 PROCEDURE — 25010000002 FENTANYL CITRATE (PF) 100 MCG/2ML SOLUTION: Performed by: NURSE ANESTHETIST, CERTIFIED REGISTERED

## 2022-07-06 PROCEDURE — 82962 GLUCOSE BLOOD TEST: CPT

## 2022-07-06 PROCEDURE — 84702 CHORIONIC GONADOTROPIN TEST: CPT | Performed by: OTOLARYNGOLOGY

## 2022-07-06 PROCEDURE — C1726 CATH, BAL DIL, NON-VASCULAR: HCPCS | Performed by: OTOLARYNGOLOGY

## 2022-07-06 PROCEDURE — 25010000002 DROPERIDOL PER 5 MG: Performed by: ANESTHESIOLOGY

## 2022-07-06 PROCEDURE — C9046 COCAINE HCL NASAL SOLUTION: HCPCS | Performed by: OTOLARYNGOLOGY

## 2022-07-06 PROCEDURE — 69706 NPS SURG DILAT EUST TUBE BI: CPT | Performed by: OTOLARYNGOLOGY

## 2022-07-06 PROCEDURE — 25010000002 ONDANSETRON PER 1 MG: Performed by: NURSE ANESTHETIST, CERTIFIED REGISTERED

## 2022-07-06 PROCEDURE — 25010000002 DEXAMETHASONE PER 1 MG: Performed by: NURSE ANESTHETIST, CERTIFIED REGISTERED

## 2022-07-06 PROCEDURE — 25010000002 PROPOFOL 10 MG/ML EMULSION: Performed by: NURSE ANESTHETIST, CERTIFIED REGISTERED

## 2022-07-06 PROCEDURE — C1889 IMPLANT/INSERT DEVICE, NOC: HCPCS | Performed by: OTOLARYNGOLOGY

## 2022-07-06 PROCEDURE — 69436 CREATE EARDRUM OPENING: CPT | Performed by: OTOLARYNGOLOGY

## 2022-07-06 PROCEDURE — 25010000002 COCAINE HCL 40 MG/ML SOLUTION: Performed by: OTOLARYNGOLOGY

## 2022-07-06 DEVICE — VENT TUBE 1056016 5PK T TUBE 1.14MM
Type: IMPLANTABLE DEVICE | Site: EAR | Status: FUNCTIONAL
Brand: GOODE T-TUBE®

## 2022-07-06 RX ORDER — FLUMAZENIL 0.1 MG/ML
0.2 INJECTION INTRAVENOUS AS NEEDED
Status: DISCONTINUED | OUTPATIENT
Start: 2022-07-06 | End: 2022-07-06 | Stop reason: HOSPADM

## 2022-07-06 RX ORDER — FENTANYL CITRATE 50 UG/ML
25 INJECTION, SOLUTION INTRAMUSCULAR; INTRAVENOUS
Status: DISCONTINUED | OUTPATIENT
Start: 2022-07-06 | End: 2022-07-06 | Stop reason: HOSPADM

## 2022-07-06 RX ORDER — SODIUM CHLORIDE 0.9 % (FLUSH) 0.9 %
3 SYRINGE (ML) INJECTION AS NEEDED
Status: DISCONTINUED | OUTPATIENT
Start: 2022-07-06 | End: 2022-07-06 | Stop reason: HOSPADM

## 2022-07-06 RX ORDER — OXYCODONE AND ACETAMINOPHEN 10; 325 MG/1; MG/1
1 TABLET ORAL ONCE AS NEEDED
Status: COMPLETED | OUTPATIENT
Start: 2022-07-06 | End: 2022-07-06

## 2022-07-06 RX ORDER — ONDANSETRON 2 MG/ML
INJECTION INTRAMUSCULAR; INTRAVENOUS AS NEEDED
Status: DISCONTINUED | OUTPATIENT
Start: 2022-07-06 | End: 2022-07-06 | Stop reason: SURG

## 2022-07-06 RX ORDER — DEXAMETHASONE SODIUM PHOSPHATE 4 MG/ML
INJECTION, SOLUTION INTRA-ARTICULAR; INTRALESIONAL; INTRAMUSCULAR; INTRAVENOUS; SOFT TISSUE AS NEEDED
Status: DISCONTINUED | OUTPATIENT
Start: 2022-07-06 | End: 2022-07-06 | Stop reason: SURG

## 2022-07-06 RX ORDER — ACETAMINOPHEN 500 MG
1000 TABLET ORAL ONCE
Status: COMPLETED | OUTPATIENT
Start: 2022-07-06 | End: 2022-07-06

## 2022-07-06 RX ORDER — COCAINE HYDROCHLORIDE 40 MG/ML
SOLUTION NASAL AS NEEDED
Status: DISCONTINUED | OUTPATIENT
Start: 2022-07-06 | End: 2022-07-06 | Stop reason: HOSPADM

## 2022-07-06 RX ORDER — NALOXONE HCL 0.4 MG/ML
0.4 VIAL (ML) INJECTION AS NEEDED
Status: DISCONTINUED | OUTPATIENT
Start: 2022-07-06 | End: 2022-07-06 | Stop reason: HOSPADM

## 2022-07-06 RX ORDER — FENTANYL CITRATE 50 UG/ML
INJECTION, SOLUTION INTRAMUSCULAR; INTRAVENOUS AS NEEDED
Status: DISCONTINUED | OUTPATIENT
Start: 2022-07-06 | End: 2022-07-06 | Stop reason: SURG

## 2022-07-06 RX ORDER — SODIUM CHLORIDE 0.9 % (FLUSH) 0.9 %
3 SYRINGE (ML) INJECTION EVERY 12 HOURS SCHEDULED
Status: DISCONTINUED | OUTPATIENT
Start: 2022-07-06 | End: 2022-07-06 | Stop reason: HOSPADM

## 2022-07-06 RX ORDER — COCAINE HYDROCHLORIDE 40 MG/ML
SOLUTION NASAL
Status: DISCONTINUED
Start: 2022-07-06 | End: 2022-07-06 | Stop reason: HOSPADM

## 2022-07-06 RX ORDER — LIDOCAINE HYDROCHLORIDE 10 MG/ML
0.5 INJECTION, SOLUTION EPIDURAL; INFILTRATION; INTRACAUDAL; PERINEURAL ONCE AS NEEDED
Status: DISCONTINUED | OUTPATIENT
Start: 2022-07-06 | End: 2022-07-06 | Stop reason: HOSPADM

## 2022-07-06 RX ORDER — PHENYLEPHRINE HCL IN 0.9% NACL 1 MG/10 ML
SYRINGE (ML) INTRAVENOUS AS NEEDED
Status: DISCONTINUED | OUTPATIENT
Start: 2022-07-06 | End: 2022-07-06 | Stop reason: SURG

## 2022-07-06 RX ORDER — PROPOFOL 10 MG/ML
VIAL (ML) INTRAVENOUS AS NEEDED
Status: DISCONTINUED | OUTPATIENT
Start: 2022-07-06 | End: 2022-07-06 | Stop reason: SURG

## 2022-07-06 RX ORDER — SODIUM CHLORIDE 0.9 % (FLUSH) 0.9 %
3-10 SYRINGE (ML) INJECTION AS NEEDED
Status: DISCONTINUED | OUTPATIENT
Start: 2022-07-06 | End: 2022-07-06 | Stop reason: HOSPADM

## 2022-07-06 RX ORDER — LIDOCAINE HYDROCHLORIDE 20 MG/ML
INJECTION, SOLUTION EPIDURAL; INFILTRATION; INTRACAUDAL; PERINEURAL AS NEEDED
Status: DISCONTINUED | OUTPATIENT
Start: 2022-07-06 | End: 2022-07-06 | Stop reason: SURG

## 2022-07-06 RX ORDER — DROPERIDOL 2.5 MG/ML
0.62 INJECTION, SOLUTION INTRAMUSCULAR; INTRAVENOUS ONCE AS NEEDED
Status: DISCONTINUED | OUTPATIENT
Start: 2022-07-06 | End: 2022-07-06 | Stop reason: HOSPADM

## 2022-07-06 RX ORDER — SCOLOPAMINE TRANSDERMAL SYSTEM 1 MG/1
1 PATCH, EXTENDED RELEASE TRANSDERMAL ONCE
Status: DISCONTINUED | OUTPATIENT
Start: 2022-07-06 | End: 2022-07-06 | Stop reason: HOSPADM

## 2022-07-06 RX ORDER — LIDOCAINE HYDROCHLORIDE AND EPINEPHRINE 10; 10 MG/ML; UG/ML
INJECTION, SOLUTION INFILTRATION; PERINEURAL AS NEEDED
Status: DISCONTINUED | OUTPATIENT
Start: 2022-07-06 | End: 2022-07-06 | Stop reason: HOSPADM

## 2022-07-06 RX ORDER — LABETALOL HYDROCHLORIDE 5 MG/ML
5 INJECTION, SOLUTION INTRAVENOUS
Status: DISCONTINUED | OUTPATIENT
Start: 2022-07-06 | End: 2022-07-06 | Stop reason: HOSPADM

## 2022-07-06 RX ORDER — SODIUM CHLORIDE, SODIUM LACTATE, POTASSIUM CHLORIDE, CALCIUM CHLORIDE 600; 310; 30; 20 MG/100ML; MG/100ML; MG/100ML; MG/100ML
100 INJECTION, SOLUTION INTRAVENOUS CONTINUOUS
Status: DISCONTINUED | OUTPATIENT
Start: 2022-07-06 | End: 2022-07-06 | Stop reason: HOSPADM

## 2022-07-06 RX ORDER — DROPERIDOL 2.5 MG/ML
0.62 INJECTION, SOLUTION INTRAMUSCULAR; INTRAVENOUS ONCE AS NEEDED
Status: COMPLETED | OUTPATIENT
Start: 2022-07-06 | End: 2022-07-06

## 2022-07-06 RX ORDER — SODIUM CHLORIDE, SODIUM LACTATE, POTASSIUM CHLORIDE, CALCIUM CHLORIDE 600; 310; 30; 20 MG/100ML; MG/100ML; MG/100ML; MG/100ML
1000 INJECTION, SOLUTION INTRAVENOUS CONTINUOUS
Status: DISCONTINUED | OUTPATIENT
Start: 2022-07-06 | End: 2022-07-06 | Stop reason: HOSPADM

## 2022-07-06 RX ORDER — IBUPROFEN 600 MG/1
600 TABLET ORAL ONCE AS NEEDED
Status: DISCONTINUED | OUTPATIENT
Start: 2022-07-06 | End: 2022-07-06 | Stop reason: HOSPADM

## 2022-07-06 RX ORDER — ONDANSETRON 2 MG/ML
4 INJECTION INTRAMUSCULAR; INTRAVENOUS ONCE AS NEEDED
Status: DISCONTINUED | OUTPATIENT
Start: 2022-07-06 | End: 2022-07-06 | Stop reason: HOSPADM

## 2022-07-06 RX ORDER — CIPROFLOXACIN AND DEXAMETHASONE 3; 1 MG/ML; MG/ML
SUSPENSION/ DROPS AURICULAR (OTIC) AS NEEDED
Status: DISCONTINUED | OUTPATIENT
Start: 2022-07-06 | End: 2022-07-06 | Stop reason: HOSPADM

## 2022-07-06 RX ORDER — CIPROFLOXACIN AND DEXAMETHASONE 3; 1 MG/ML; MG/ML
4 SUSPENSION/ DROPS AURICULAR (OTIC) 2 TIMES DAILY
Status: DISCONTINUED | OUTPATIENT
Start: 2022-07-06 | End: 2022-07-06 | Stop reason: HOSPADM

## 2022-07-06 RX ORDER — MIDAZOLAM HYDROCHLORIDE 1 MG/ML
1 INJECTION INTRAMUSCULAR; INTRAVENOUS
Status: DISCONTINUED | OUTPATIENT
Start: 2022-07-06 | End: 2022-07-06 | Stop reason: HOSPADM

## 2022-07-06 RX ORDER — OXYCODONE AND ACETAMINOPHEN 7.5; 325 MG/1; MG/1
2 TABLET ORAL EVERY 4 HOURS PRN
Status: DISCONTINUED | OUTPATIENT
Start: 2022-07-06 | End: 2022-07-06 | Stop reason: HOSPADM

## 2022-07-06 RX ADMIN — ACETAMINOPHEN 1000 MG: 500 TABLET, FILM COATED ORAL at 07:21

## 2022-07-06 RX ADMIN — DEXAMETHASONE SODIUM PHOSPHATE 8 MG: 4 INJECTION, SOLUTION INTRA-ARTICULAR; INTRALESIONAL; INTRAMUSCULAR; INTRAVENOUS; SOFT TISSUE at 07:34

## 2022-07-06 RX ADMIN — PROPOFOL 200 MG: 10 INJECTION, EMULSION INTRAVENOUS at 07:28

## 2022-07-06 RX ADMIN — FENTANYL CITRATE 50 MCG: 50 INJECTION, SOLUTION INTRAMUSCULAR; INTRAVENOUS at 07:50

## 2022-07-06 RX ADMIN — Medication 100 MCG: at 07:45

## 2022-07-06 RX ADMIN — OXYCODONE AND ACETAMINOPHEN 1 TABLET: 325; 10 TABLET ORAL at 08:21

## 2022-07-06 RX ADMIN — LIDOCAINE HYDROCHLORIDE 5 ML: 20 INJECTION, SOLUTION EPIDURAL; INFILTRATION; INTRACAUDAL; PERINEURAL at 07:28

## 2022-07-06 RX ADMIN — ONDANSETRON 4 MG: 2 INJECTION INTRAMUSCULAR; INTRAVENOUS at 07:34

## 2022-07-06 RX ADMIN — SODIUM CHLORIDE, POTASSIUM CHLORIDE, SODIUM LACTATE AND CALCIUM CHLORIDE 1000 ML: 600; 310; 30; 20 INJECTION, SOLUTION INTRAVENOUS at 06:29

## 2022-07-06 RX ADMIN — FENTANYL CITRATE 50 MCG: 50 INJECTION, SOLUTION INTRAMUSCULAR; INTRAVENOUS at 07:28

## 2022-07-06 RX ADMIN — DROPERIDOL 0.62 MG: 2.5 INJECTION, SOLUTION INTRAMUSCULAR; INTRAVENOUS at 07:21

## 2022-07-06 RX ADMIN — SCOPALAMINE 1 PATCH: 1 PATCH, EXTENDED RELEASE TRANSDERMAL at 07:21

## 2022-07-06 NOTE — ANESTHESIA PREPROCEDURE EVALUATION
Anesthesia Evaluation     Patient summary reviewed   history of anesthetic complications: difficult airway  NPO Solid Status: > 8 hours             Airway   Mallampati: III  TM distance: >3 FB  Neck ROM: full  Possible difficult intubation  Dental      Pulmonary    (-) COPD, asthma, sleep apnea, not a smoker  Cardiovascular   Exercise tolerance: excellent (>7 METS)    (+) hypertension,   (-) pacemaker, past MI, angina, cardiac stents      Neuro/Psych  (-) seizures, TIA, CVA  GI/Hepatic/Renal/Endo    (+) obesity,   diabetes mellitus type 2,   (-) GERD, liver disease, no renal disease    Musculoskeletal     Abdominal   (+) obese,    Substance History      OB/GYN    (-)  Pregnant        Other                          Anesthesia Plan    ASA 2     general     (Reports unable to be intubated at [facility] June 2022; )  intravenous induction     Anesthetic plan, risks, benefits, and alternatives have been provided, discussed and informed consent has been obtained with: patient.

## 2022-07-06 NOTE — OP NOTE
[facility]  OPERATIVE REPORT    Shante Bowie  7/6/2022    Pre-op Diagnosis:   S/P myringotomy with insertion of tube [Z96.22]  Dysfunction of both eustachian tubes [H69.83]  Chronic mucoid otitis media of both ears [H65.33]    Post-op Diagnosis:     S/P myringotomy with insertion of tube [Z96.22]  Dysfunction of both eustachian tubes [H69.83]  Chronic mucoid otitis media of both ears [H65.33]    Procedure/CPT® Codes:  RIGID NASAL ENDOSCOPY WITH BILATERAL EUSTACHIAN TUBE BALLOON DILATION    RIGHT T TUBE PLACEMENT  LEFT EXAMINATION OF THE EAR UNDER ANESTHESIA    Surgeon(s):  Lazarus Clark MD    Anesthesia:   Laryngeal Mask Anesthesia    Estimated Blood Loss:   None    Specimens:                None      Drains:   None    Findings:  As below    Complications:  None    Procedure Description:  The patient was taken back to the operating room, placed in the supine position and under Laryngeal Mask Anesthesia the patient was prepped and draped in the usual fashion.      A speculum was introduced in the right external auditory canal and visualization undertaken with the Leica operating microscope.  A small amount of cerumen was removed with a cerumen loop and an anterior inferior myringotomy incision was made with the myringotomy knife.  A small serous effusion was suctioned from the middle ear space.  The middle ear mucosa appeared mildly edematous.  A T-tube was placed into the myringotomy site without difficulty and Ciprodex Drops added to the external auditory canal.  A cotton ball was added to the meatus.  Attention was turned to the opposite ear where an in place and patent PE tube was noted.  Small amount of serous drainage was suctioned from the tube but otherwise it was normal in appearance.    Approximately 4 cc of 4% cocaine solution impregnated on Lucidity ([employer]) neurosurgical pledgets were placed intranasally and 5 minutes allowed to pass by the clock.  The pledgets were removed and rigid nasal endoscopy  performed the Stortz 0 degree endoscope bilaterally.  Moderate edema of the inferior turbinates was noted.  No polypoid changes were appreciated.    Utilizing the [employer] eustachian tube dilatation balloon, visualization of the eustachian tube orifice was accomplished on the left.  The balloon's probe was subsequently inserted in a downward or inferiorly directed position along the septum and the tip was gently rotated into the orifice on the left.  The device with the balloon was advanced to the double black line marking the extent of the dissection and the balloon advanced subsequently without difficulty.  No resistance was appreciated.  The balloon was subsequently inflated and remained inflated for 2 minutes by the clock.  Subsequently the balloon was deflated and withdrawn. No significant bleeding was encountered and attention was then turned to the right side where a similar procedure was performed with similar findings.     The procedure was subsequently terminated.  The patient tolerated the procedure well without complications.   The patient subsequently was transported to the Post Anesthesia Care Unit in stable condition.       Lazarus Clark MD     Date: 7/6/2022  Time: 07:03 CDT

## 2022-07-06 NOTE — ANESTHESIA PROCEDURE NOTES
Airway  Urgency: elective    Date/Time: 7/6/2022 7:30 AM  Airway not difficult    General Information and Staff    Patient location during procedure: OR  CRNA/CAA: Lakesha Moeller CRNA    Indications and Patient Condition  Indications for airway management: airway protection    Preoxygenated: yes  Mask difficulty assessment: 0 - not attempted    Final Airway Details  Final airway type: supraglottic airway      Successful airway: flexible  Size 4    Number of attempts at approach: 1  Assessment: lips, teeth, and gum same as pre-op and atraumatic intubation

## 2022-07-06 NOTE — ANESTHESIA POSTPROCEDURE EVALUATION
Patient: Shante Bowie    Procedure Summary     Date: 07/06/22 Room / Location: [facility] OR  /  [facility] OR    Anesthesia Start: 0724 Anesthesia Stop: 0804    Procedure: EUSTACHIAN TUBE BALLOON DILATION WITH RIGHT T TUBE PLACEMENT (N/A Nose) Diagnosis:       S/P myringotomy with insertion of tube      Dysfunction of both eustachian tubes      Chronic mucoid otitis media of both ears      (S/P myringotomy with insertion of tube [Z96.22])      (Dysfunction of both eustachian tubes [H69.83])      (Chronic mucoid otitis media of both ears [H65.33])    Surgeons: Lazarus Clark MD Provider: Lakesha Meoller CRNA    Anesthesia Type: general ASA Status: 2          Anesthesia Type: general    Vitals  Vitals Value Taken Time   /67 07/06/22 0846   Temp 98.1 °F (36.7 °C) 07/06/22 0830   Pulse 90 07/06/22 0848   Resp 18 07/06/22 0836   SpO2 97 % 07/06/22 0848   Vitals shown include unvalidated device data.        Post Anesthesia Care and Evaluation    Patient location during evaluation: PACU  Patient participation: complete - patient participated  Level of consciousness: awake and alert  Pain management: adequate    Airway patency: patent  Anesthetic complications: No anesthetic complications    Cardiovascular status: acceptable  Respiratory status: acceptable  Hydration status: acceptable    Comments: Blood pressure 114/86, pulse 90, temperature 98.1 °F (36.7 °C), resp. rate 18, SpO2 98 %, not currently breastfeeding.    Pt discharged from PACU based on kalia score >8

## 2022-07-09 LAB
QT INTERVAL: 394 MS
QTC INTERVAL: 465 MS

## 2022-08-05 NOTE — PROGRESS NOTES
FOLLOW-UP AUDIOMETRIC EVALUATION      Name:  Shante Bowie  :  1985  Age:  37 y.o.  Date of Evaluation:  2022       History:  Reason for visit:  Ms. Bowie (formerly Shante Negrete) is seen today at the request of Lazarus Clark MD for a follow-up hearing evaluation. Patient has a history of bilateral conductive hearing loss and bilateral eustachian tube dysfunction. Patient had bilateral myringotomy with insertion of T-tubes on 2021. Previous audio was on 2021. Patient feels like her hearing has gotten worse, especially in group settings. Patient feels her left ear is worse than her right ear. Patient reports occasional tinnitus in the left ear. She states she has been having drainage issues with the left ear as well. Patient denies aural fullness, otalgia, and dizziness.    EVALUATION:        RESULTS:    Otoscopic Evaluation:  Bilateral: clear canal, tympanic membrane visualized and PE tube visualized         Tympanometry (226 Hz):  Bilateral: Type B- large ear canal volume    Pure Tone Audiometry:    Bilateral: mild rising to normal, sloping back to mild conductive hearing loss         Speech Audiometry:   Right: Speech Reception Threshold (SRT) was obtained at 20 dBHL  Word Recognition scores- excellent/within normal limits (90 - 100%) using NU-6 List 4A, 25 words  Left: Speech Reception Threshold (SRT) was obtained at 25 dBHL  Word Recognition scores- excellent/within normal limits (90 - 100%) using NU-6 List 4A, 25 words      IMPRESSIONS:  Tympanometry showed a large ear canal volume, consistent with a tympanic membrane perforation or a patent PE tube, for both ears. Pure tone thresholds for both ears show a mild rising to normal, sloping back to mild conductive hearing loss, suggesting abnormal outer/middle ear function and normal cochlear/retrocochlear function. Left ear is worse than the right ear at 4-8kHz. Slight decline in left ear pure tones in higher frequencies compared to previous  audio. Patient was counseled with regard to the findings.    Diagnosis:   1. Conductive hearing loss, bilateral    2. Tinnitus of left ear    3. S/P myringotomy with insertion of tube    4. Dysfunction of both eustachian tubes        RECOMMENDATIONS/PLAN:  Follow-up recommendations per VIRGIL Jovel    Audiologic follow-up in 1 year  Return for audiologic testing if noticing changes in hearing or concerns arise  Use communication strategies  Avoid silence when possible. Sleep with white noise/fan, or listen to nature sounds      EDUCATION:  Discussed results and recommendations with patient. Questions were addressed and the patient was encouraged to contact our department should concerns arise.      Trisha Munoz [facility]  Licensed Audiologist

## 2022-08-08 ENCOUNTER — OFFICE VISIT (OUTPATIENT)
Dept: OTOLARYNGOLOGY | Facility: CLINIC | Age: 37
End: 2022-08-08

## 2022-08-08 ENCOUNTER — PROCEDURE VISIT (OUTPATIENT)
Dept: OTOLARYNGOLOGY | Facility: CLINIC | Age: 37
End: 2022-08-08

## 2022-08-08 VITALS
DIASTOLIC BLOOD PRESSURE: 87 MMHG | SYSTOLIC BLOOD PRESSURE: 148 MMHG | WEIGHT: 170 LBS | RESPIRATION RATE: 16 BRPM | TEMPERATURE: 98.7 F | HEART RATE: 81 BPM | HEIGHT: 60 IN | BODY MASS INDEX: 33.38 KG/M2

## 2022-08-08 DIAGNOSIS — H90.0 CONDUCTIVE HEARING LOSS, BILATERAL: Primary | ICD-10-CM

## 2022-08-08 DIAGNOSIS — Z96.22 S/P MYRINGOTOMY WITH INSERTION OF TUBE: ICD-10-CM

## 2022-08-08 DIAGNOSIS — J32.9 CHRONIC SINUSITIS, UNSPECIFIED LOCATION: ICD-10-CM

## 2022-08-08 DIAGNOSIS — H93.12 TINNITUS OF LEFT EAR: ICD-10-CM

## 2022-08-08 DIAGNOSIS — H69.83 DYSFUNCTION OF BOTH EUSTACHIAN TUBES: ICD-10-CM

## 2022-08-08 DIAGNOSIS — H69.83 DYSFUNCTION OF BOTH EUSTACHIAN TUBES: Primary | ICD-10-CM

## 2022-08-08 DIAGNOSIS — H66.006 RECURRENT ACUTE SUPPURATIVE OTITIS MEDIA WITHOUT SPONTANEOUS RUPTURE OF TYMPANIC MEMBRANE OF BOTH SIDES: ICD-10-CM

## 2022-08-08 PROCEDURE — 92557 COMPREHENSIVE HEARING TEST: CPT

## 2022-08-08 PROCEDURE — 92567 TYMPANOMETRY: CPT

## 2022-08-08 PROCEDURE — 99213 OFFICE O/P EST LOW 20 MIN: CPT | Performed by: NURSE PRACTITIONER

## 2022-08-08 RX ORDER — PROGESTERONE 100 MG/1
100 CAPSULE ORAL DAILY
COMMUNITY
End: 2022-09-20

## 2022-08-08 NOTE — PROGRESS NOTES
YOB: 1985  Location: [facility]  Location Address: [address] [place] [address], [facility], [address] [place] [address]  Location Phone: [phone]    Chief Complaint   Patient presents with   • tubes   • Ear Problem     Left ear excessively draining. She is using a cotton ball in ear at night. No color or smell to the drainage.        History of Present Illness  Shante Bowie is a 37 y.o. female.  Shante Bowie is here for follow up of ENT complaints. The patient is s/p bilateral eustachian tube dilation and right t-tube placement on 2022.  Patient reports otorrhea from the left ear. Declines color or odor. Declines ear pain.    Procedure visit with Lakisha Acosta AUD (2022)    Past Medical History:   Diagnosis Date   • Depression    • Diabetic acidosis, type II (HCC)    • Ear infection    • Hard to intubate    • Hearing loss     frequent ear infections   • High blood pressure    • History of transfusion        • HL (hearing loss)    • Migraines    • Polycystic ovary syndrome    • Rosacea    • Sleep apnea     wears c pap       Past Surgical History:   Procedure Laterality Date   • ADENOIDECTOMY     • BREAST SURGERY Bilateral     augmentation on left, reduction on right   • D & C AND LAPAROSCOPY     • DILATATION AND CURETTAGE      2022   • FEMUR FRACTURE SURGERY Left    • FOOT TENDON SURGERY Left    • KNEE ACL RECONSTRUCTION Left    • MYRINGOTOMY W/ TUBES      pt states has had 22 sets if tubes   • MYRINGOTOMY W/ TUBES Bilateral 2021    Procedure: BILATERAL MYRINGOTOMY WITH INSERTION OF EAR T-TUBES;  Surgeon: Lazarus Clark MD;  Location: [facility];  Service: ENT;  Laterality: Bilateral;   • TONSILLECTOMY         Outpatient Medications Marked as Taking for the 22 encounter (Office Visit) with Negrito Orellana APRN   Medication Sig Dispense Refill   • azelastine (ASTELIN) 0.1 % nasal spray 2 sprays into the nostril(s) as directed by provider 2 (Two) Times a Day. Use in each nostril  as directed 30 mL 11   • buPROPion SR (WELLBUTRIN SR) 150 MG 12 hr tablet Every 12 (Twelve) Hours.     • busPIRone (BUSPAR) 5 MG tablet Take 5 mg by mouth 4 (Four) Times a Day As Needed.     • fluticasone (FLONASE) 50 MCG/ACT nasal spray 2 sprays into the nostril(s) as directed by provider Daily. (Patient taking differently: 2 sprays into the nostril(s) as directed by provider Daily. Alternate with nasal sprays) 16 g 11   • glimepiride (AMARYL) 2 MG tablet Take 2 mg by mouth 2 (Two) Times a Day.     • labetalol (NORMODYNE) 100 MG tablet Take 200 mg by mouth 2 (Two) Times a Day.     • levocetirizine (XYZAL) 5 MG tablet Take 5 mg by mouth Every Evening.     • loratadine (CLARITIN) 10 MG tablet Take 10 mg by mouth Daily.     • Progesterone (PROMETRIUM) 100 MG capsule Take 100 mg by mouth Daily.         Gold-containing drug products    Family History   Problem Relation Age of Onset   • Hypertension Father    • Deep vein thrombosis Father        Social History     Socioeconomic History   • Marital status:    Tobacco Use   • Smoking status: Never Smoker   • Smokeless tobacco: Never Used   Vaping Use   • Vaping Use: Never used   Substance and Sexual Activity   • Alcohol use: Not Currently     Comment: rare   • Drug use: Never   • Sexual activity: Defer       Review of Systems   Constitutional: Negative.    HENT: Positive for ear discharge. Negative for ear pain.    Respiratory: Negative.    Cardiovascular: Negative.    Gastrointestinal: Negative.    Genitourinary: Negative.    Musculoskeletal: Negative.    Skin: Negative.    Neurological: Negative.        Vitals:    08/08/22 1024   BP: 148/87   Pulse: 81   Resp: 16   Temp: 98.7 °F (37.1 °C)       Body mass index is 33.2 kg/m².    Objective     Physical Exam  Vitals reviewed.   HENT:      Head: Normocephalic.      Right Ear: Hearing, ear canal and external ear normal. A PE tube is present.      Left Ear: Hearing, ear canal and external ear normal. A PE tube is  present.      Ears:      Comments: Bilateral t-tubes dry and intact     Nose: Nose normal.      Mouth/Throat:      Lips: Pink.      Mouth: Mucous membranes are moist.      Pharynx: Oropharynx is clear. Uvula midline.   Neurological:      Mental Status: She is alert.         Assessment & Plan   Diagnoses and all orders for this visit:    1. Dysfunction of both eustachian tubes (Primary)    2. Chronic sinusitis, unspecified location  -     CT Sinus Without Contrast; Future    3. S/P myringotomy with insertion of tube    4. Recurrent acute suppurative otitis media without spontaneous rupture of tympanic membrane of both sides      * Surgery not found *  Orders Placed This Encounter   Procedures   • CT Sinus Without Contrast     Standing Status:   Future     Standing Expiration Date:   8/8/2023     Scheduling Instructions:      Stealth -  image guidance     Order Specific Question:   Patient Pregnant     Answer:   Unknown     Order Specific Question:   Release to patient     Answer:   Immediate     CT of sinuses ordered for significant sinus findings noted during operative exam  Call for any new or worsening problems or concerns    Return in about 6 weeks (around 9/19/2022).       Patient Instructions   CT of sinuses ordered for significant sinus findings noted during operative exam  Call for any new or worsening problems or concerns    CONTACT INFORMATION:  The main office phone number is [phone]. For emergencies after hours and on weekends, this number will convert over to our answering service and the on call provider will answer. Please try to keep non emergent phone calls/ questions to office hours 9am-5pm Monday through Friday.      [employer]  As an alternative, you can sign up and use the Epic MyChart system for more direct and quicker access for non emergent questions/ problems.  [facility] [employer]SC[employer] allows you to send messages to your doctor, view your test results, renew your prescriptions, schedule  appointments, and more. To sign up, go to [email or web address] and click on the Sign Up Now link in the New User? box. Enter your [employer] Activation Code exactly as it appears below along with the last four digits of your Social Security Number and your Date of Birth () to complete the sign-up process. If you do not sign up before the expiration date, you must request a new code.     [employer] Activation Code: Activation code not generated  Current [employer] Status: Active     If you have questions, you can email [email or web address] or call [phone] to talk to our [employer] staff. Remember, [employer] is NOT to be used for urgent needs. For medical emergencies, dial 911.     IF YOU SMOKE OR USE TOBACCO PLEASE READ THE FOLLOWING:  Why is smoking bad for me?  Smoking increases the risk of heart disease, lung disease, vascular disease, stroke, and cancer. If you smoke, STOP!        IF YOU SMOKE OR USE TOBACCO PLEASE READ THE FOLLOWING:  Why is smoking bad for me?  Smoking increases the risk of heart disease, lung disease, vascular disease, stroke, and cancer. If you smoke, STOP!     For more information:  Quit Now [place]  1800-QUIT-NOW  [email or web address]

## 2022-08-08 NOTE — PATIENT INSTRUCTIONS
CT of sinuses ordered for significant sinus findings noted during operative exam  Call for any new or worsening problems or concerns    CONTACT INFORMATION:  The main office phone number is [phone]. For emergencies after hours and on weekends, this number will convert over to our answering service and the on call provider will answer. Please try to keep non emergent phone calls/ questions to office hours 9am-5pm Monday through Friday.      [employer]  As an alternative, you can sign up and use the Epic MyChart system for more direct and quicker access for non emergent questions/ problems.  [employer] allows you to send messages to your doctor, view your test results, renew your prescriptions, schedule appointments, and more. To sign up, go to [employer] and click on the Sign Up Now link in the New User? box. Enter your [employer] Activation Code exactly as it appears below along with the last four digits of your Social Security Number and your Date of Birth () to complete the sign-up process. If you do not sign up before the expiration date, you must request a new code.     [employer] Activation Code: Activation code not generated  Current [employer] Status: Active     If you have questions, you can email [email or web address] or call [phone] to talk to our [employer] staff. Remember, [employer] is NOT to be used for urgent needs. For medical emergencies, dial 911.     IF YOU SMOKE OR USE TOBACCO PLEASE READ THE FOLLOWING:  Why is smoking bad for me?  Smoking increases the risk of heart disease, lung disease, vascular disease, stroke, and cancer. If you smoke, STOP!        IF YOU SMOKE OR USE TOBACCO PLEASE READ THE FOLLOWING:  Why is smoking bad for me?  Smoking increases the risk of heart disease, lung disease, vascular disease, stroke, and cancer. If you smoke, STOP!     For more information:  [employer]uit Now [place]  -QUIT-NOW  [email or web address]    H&P reviewed  After examining the patient I find no changes in the patients condition since the H&P had been written  Discussed options for managing Stephon's right distal ureteral calculus which has failed to pass despite an appropriate interval of observation  Active intervention with ureteroscopic he was recommended      Discussed options for management of the patient's ureteral stone  We discussed surgical options including ureteroscopy and shock wave lithotripsy  In addition we discussed conservative management with medical expulsive therapy  The patient has elected to undergo ureteroscopy  I discussed with the patients risks and benefits and alternatives to ureteroscopy with laser lithotripsy  The risks include bleeding, infection, injury to the urethra, bladder, ureter or kidney, risk of a staged procedure, risk of stricture, risk of residual fragments, risk of loss of kidney, risks of anesthesia including DVT, PE, MI and death  The patient understands that a ureteral stent will likely be left in place at the time of the procedure  We reviewed the expected postoperative care  The patient understands these risks and has provided informed consent for RIGHT ureteroscopy

## 2022-08-12 ENCOUNTER — HOSPITAL ENCOUNTER (OUTPATIENT)
Dept: CT IMAGING | Facility: HOSPITAL | Age: 37
Discharge: HOME | End: 2022-08-12
Admitting: NURSE PRACTITIONER

## 2022-08-12 DIAGNOSIS — J32.9 CHRONIC SINUSITIS, UNSPECIFIED LOCATION: ICD-10-CM

## 2022-08-12 PROCEDURE — 70486 CT MAXILLOFACIAL W/O DYE: CPT

## 2022-08-17 ENCOUNTER — TELEPHONE (OUTPATIENT)
Dept: OTOLARYNGOLOGY | Facility: CLINIC | Age: 37
End: 2022-08-17

## 2022-08-17 RX ORDER — CIPROFLOXACIN AND DEXAMETHASONE 3; 1 MG/ML; MG/ML
4 SUSPENSION/ DROPS AURICULAR (OTIC) 2 TIMES DAILY
Qty: 7.5 ML | Refills: 0 | Status: ON HOLD | OUTPATIENT
Start: 2022-08-17 | End: 2022-10-31

## 2022-08-17 NOTE — TELEPHONE ENCOUNTER
----- Message from VIRGIL Marcano sent at 8/15/2022  9:10 AM CDT -----  Please give patient CT results. Will further discuss with Dr. Clark at follow up

## 2022-09-19 NOTE — PROGRESS NOTES
YOB: 1985  Location: [facility]  Location Address: [address] [place] [address], [facility], [address] [place] [address]  Location Phone: [phone]    Chief Complaint   Patient presents with   • Ear Problem     Left ear still draining really bad.   • Sinus Problem     Had CT       History of Present Illness  Shante Bowie is a 37 y.o. female.  Shante Bowie is here for follow up of ENT complaints. The patient has had problems with sinus pain/pressure and otorrhea   She is s/p bilateral eustachian tube dilation, right t tube placement and left eua 2022  She continues to have drainage from the left ear and states it has been persistent in nature. She has been using ciprodex since  with no improvement   She is also using flonase and astelin as well as claritin and zyrtec (one in the morning and one at night)     CT Sinus Without Contrast (2022 17:23)              Study Result    Narrative & Impression   EXAMINATION: CT sinuses without contrast 2022     HISTORY: Sinusitis.     DOSE: 322 mGycm. All CT scans are performed using dose optimization  techniques as appropriate to the performed exam and including at least  one of the following: Automated exposure control, adjustment of the mA  and/or kV according to size, and the use of the iterative reconstruction  technique.     FINDINGS: Multiple contiguous axials are obtained through the paranasal  sinuses per protocol without intravenous contrast-enhancement with  reformatted images obtained in the sagittal and coronal projections from  the original data set.     There is bony nasal septal deviation to the right. 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 There is mild mucoperiosteal  thickening of the maxillary sinuses. A tiny mucous retention cyst or  polyp is noted within the left sphenoid sinus. The paranasal sinuses are  otherwise clear. No air-fluid levels are present.     A small left-sided mastoid effusion is present. Right mastoid air  cells  and epitympanum are unremarkable. There does appear to be some mild  thickening of the tympanic membrane on the left with a small amount of  soft tissue density within the epitympanum on the left. Unfortunately,  the mastoid air cells and middle ear cavity are not imaged in the  coronal or sagittal plane. The accessory ossicles appear to be intact  bilaterally. There is no expansion of the internal auditory canals.     IMPRESSION:  1.. Small left mastoid effusion. There is mild thickening of the  tympanic membrane on the left and there appears to be a small amount of  soft tissue within the epitympanum on the left. The accessory ossicles  are intact. The temporal bone is not imaged in its entirety in the  sagittal and coronal reconstructions.  2. Bony nasal septal deviation to the right. There is a conchal bullosa  of the left middle turbinate with mild mucosal thickening of the nasal  turbinates. There is also mild mucoperiosteal thickening in the  maxillary sinuses and left sphenoid sinus. No air-fluid levels are  present.  This report was finalized on 08/12/2022 18:04 by Dr. Nahun Moore MD.          Past Medical History:   Diagnosis Date   • Asthma     At least 5-10 years   • Depression    • Diabetic acidosis, type II (HCC)    • Ear infection    • Hard to intubate    • Hearing loss     frequent ear infections   • High blood pressure    • History of transfusion     2003   • HL (hearing loss)    • Migraines    • Polycystic ovary syndrome    • Rosacea    • Sleep apnea     wears c pap       Past Surgical History:   Procedure Laterality Date   • ADENOIDECTOMY     • BREAST SURGERY Bilateral 2002    augmentation on left, reduction on right   • D & C AND LAPAROSCOPY     • DILATATION AND CURETTAGE      6-   • FEMUR FRACTURE SURGERY Left    • FOOT TENDON SURGERY Left    • KNEE ACL RECONSTRUCTION Left    • MYRINGOTOMY W/ TUBES      pt states has had 22 sets if tubes   • MYRINGOTOMY W/ TUBES Bilateral  03/19/2021    Procedure: BILATERAL MYRINGOTOMY WITH INSERTION OF EAR T-TUBES;  Surgeon: Lazarus Clark MD;  Location: [facility];  Service: ENT;  Laterality: Bilateral;   • TONSILLECTOMY         Outpatient Medications Marked as Taking for the 9/20/22 encounter (Office Visit) with Lazarus Clark MD   Medication Sig Dispense Refill   • azelastine (ASTELIN) 0.1 % nasal spray 2 sprays into the nostril(s) as directed by provider 2 (Two) Times a Day. Use in each nostril as directed 30 mL 11   • buPROPion SR (WELLBUTRIN SR) 150 MG 12 hr tablet Every 12 (Twelve) Hours. Takes 150 during the day and 200 at night     • busPIRone (BUSPAR) 5 MG tablet Take 5 mg by mouth 4 (Four) Times a Day As Needed.     • ciprofloxacin-dexamethasone (CIPRODEX) 0.3-0.1 % otic suspension Administer 4 drops into ear(s) as directed by provider 2 (Two) Times a Day. 7.5 mL 0   • fluticasone (FLONASE) 50 MCG/ACT nasal spray 2 sprays into the nostril(s) as directed by provider Daily. (Patient taking differently: 2 sprays into the nostril(s) as directed by provider Daily. Alternate with nasal sprays) 16 g 11   • glimepiride (AMARYL) 2 MG tablet Take 2 mg by mouth 2 (Two) Times a Day.     • labetalol (NORMODYNE) 100 MG tablet Take 200 mg by mouth 2 (Two) Times a Day.     • levocetirizine (XYZAL) 5 MG tablet Take 5 mg by mouth Every Evening.     • loratadine (CLARITIN) 10 MG tablet Take 10 mg by mouth Daily.     • Prenatal Vit-Fe Fumarate-FA (PRENATAL VITAMINS PO) Take  by mouth.     • triamterene-hydrochlorothiazide (DYAZIDE) 37.5-25 MG per capsule          Gold-containing drug products    Family History   Problem Relation Age of Onset   • Asthma Mother    • Rashes / Skin problems Mother    • Hypertension Father    • Deep vein thrombosis Father        Social History     Socioeconomic History   • Marital status:    Tobacco Use   • Smoking status: Never Smoker   • Smokeless tobacco: Never Used   Vaping Use   • Vaping Use: Never used    Substance and Sexual Activity   • Alcohol use: Not Currently     Comment: rare   • Drug use: Not Currently   • Sexual activity: Defer       Review of Systems   Constitutional: Negative.    HENT: Positive for congestion, ear discharge, hearing loss, sinus pressure and sinus pain.    Eyes: Negative.    Respiratory: Negative.    Cardiovascular: Negative.    Gastrointestinal: Negative.    Endocrine: Negative.    Genitourinary: Negative.    Musculoskeletal: Negative.    Allergic/Immunologic: Positive for environmental allergies.       Vitals:    09/20/22 0907   BP: 136/92   Pulse: 82   Resp: 16   Temp: 98.7 °F (37.1 °C)       Body mass index is 33.59 kg/m².    Objective     Physical Exam  Vitals reviewed.   Constitutional:       Appearance: Normal appearance. She is obese.   HENT:      Head: Normocephalic.      Right Ear: External ear normal. A PE tube (t tube dry and patent ) is present.      Left Ear: A PE tube (t tube mildly displaced with mucoid drainage ) is present.      Ears:      Comments: Bilateral with tympanosclerosis        Nose: Septal deviation and mucosal edema present.      Right Turbinates: Enlarged.      Left Turbinates: Enlarged.      Comments: Left polyp noted        Mouth/Throat:      Lips: Pink.      Mouth: Mucous membranes are moist.      Comments: Brooks II     Neurological:      Mental Status: She is alert.         Assessment & Plan   Diagnoses and all orders for this visit:    1. Nasal polyp (Primary)  -     Case Request; Standing  -     Comprehensive Metabolic Panel; Future  -     CBC and Differential; Future  -     ECG 12 Lead; Future  -     XR Chest 2 View; Future  -     Protime-INR; Future  -     APTT; Future  -     Case Request    2. Recurrent acute suppurative otitis media without spontaneous rupture of tympanic membrane of both sides  -     Case Request; Standing  -     Comprehensive Metabolic Panel; Future  -     CBC and Differential; Future  -     ECG 12 Lead; Future  -     XR Chest  2 View; Future  -     Protime-INR; Future  -     APTT; Future  -     Case Request    3. Conductive hearing loss, bilateral  -     Case Request; Standing  -     Comprehensive Metabolic Panel; Future  -     CBC and Differential; Future  -     ECG 12 Lead; Future  -     XR Chest 2 View; Future  -     Protime-INR; Future  -     APTT; Future  -     Case Request    4. Tinnitus of left ear    5. Chronic mucoid otitis media of both ears    6. Otorrhea, left    7. Adrián bullosa  -     Case Request; Standing  -     Comprehensive Metabolic Panel; Future  -     CBC and Differential; Future  -     ECG 12 Lead; Future  -     XR Chest 2 View; Future  -     Protime-INR; Future  -     APTT; Future  -     Case Request    8. Nasal septal deviation  -     Case Request; Standing  -     Comprehensive Metabolic Panel; Future  -     CBC and Differential; Future  -     ECG 12 Lead; Future  -     XR Chest 2 View; Future  -     Protime-INR; Future  -     APTT; Future  -     Case Request    Other orders  -     Follow Anesthesia Guidelines / Standing Orders  -     Provide Patient With Instructions on NPO Status  -     Obtain Informed Consent    risks vs benefits of surgery discussed   Patient wishes to proceed    Patient currently on fertility medications and will call at the end of the month for date pending pregnancy test results     IMAGE GUIDED SEPTOPLASTY, RESECTION INFERIOR TURBINATES, LEFT ADRIÁN BULLOSA RESECTION WITH LEFT ANTROSTOMY, WITH ADENOIDECTOMY (N/A), MYRINGOPLASTY WITH LEFT T TUBE PLACEMENT (Left)  Orders Placed This Encounter   Procedures   • XR Chest 2 View     Standing Status:   Future     Standing Expiration Date:   9/20/2023     Order Specific Question:   Reason for Exam:     Answer:   preop testing     Order Specific Question:   Patient Pregnant     Answer:   Unknown   • Comprehensive Metabolic Panel     Standing Status:   Future     Standing Expiration Date:   9/20/2023     Order Specific Question:   Release to patient      Answer:   Immediate   • Protime-INR     Standing Status:   Future     Standing Expiration Date:   9/20/2023     Order Specific Question:   Release to patient     Answer:   Routine Release   • APTT     Standing Status:   Future     Standing Expiration Date:   9/20/2023     Order Specific Question:   Release to patient     Answer:   Routine Release   • Follow Anesthesia Guidelines / Standing Orders   • Provide Patient With Instructions on NPO Status   • Obtain Informed Consent     Order Specific Question:   Informed Consent Given For     Answer:   IMAGE GUIDED SEPTOPLASTY, RESECTION INFERIOR TURBINATES, LEFT ADRIÁN BULLOSA RESECTION WITH LEFT ANTROSTOMY, WITH ADENOIDECTOMY WITH MYRINGOTOMY WITH LEFT T TUBE PLACEMENT   • ECG 12 Lead     Standing Status:   Future     Standing Expiration Date:   9/20/2023     Order Specific Question:   Reason for Exam:     Answer:   preop testing   • CBC and Differential     Standing Status:   Future     Standing Expiration Date:   9/20/2023     Order Specific Question:   Manual Differential     Answer:   No     No follow-ups on file.         Patient Instructions   FUNCTIONAL ENDOSCOPIC SINUS SURGERY: A functional endoscopic sinus surgery was recommended. The risks and benefits were explained including but not limited to pain, bleeding (with the possible need for nasal packing), infection, risks of the general anesthesia, orbital injury with blurred vision or visual loss, cerebrospinal fluid leak, persistent disease, scarring, synichiae and the possibility for the need of reoperation. Possibilities of additional sinus work or less sinus work depending on the status of the nose at the time of the operation was discussed. Alternatives were discussed. No guarantees were made or implied. Questions were asked appropriately answered.

## 2022-09-20 ENCOUNTER — OFFICE VISIT (OUTPATIENT)
Dept: OTOLARYNGOLOGY | Facility: CLINIC | Age: 37
End: 2022-09-20

## 2022-09-20 VITALS
TEMPERATURE: 98.7 F | HEART RATE: 82 BPM | SYSTOLIC BLOOD PRESSURE: 136 MMHG | RESPIRATION RATE: 16 BRPM | DIASTOLIC BLOOD PRESSURE: 92 MMHG | HEIGHT: 60 IN | BODY MASS INDEX: 33.77 KG/M2 | WEIGHT: 172 LBS

## 2022-09-20 DIAGNOSIS — J33.9 NASAL POLYP: Primary | ICD-10-CM

## 2022-09-20 DIAGNOSIS — J34.2 NASAL SEPTAL DEVIATION: ICD-10-CM

## 2022-09-20 DIAGNOSIS — J34.89 CONCHA BULLOSA: ICD-10-CM

## 2022-09-20 DIAGNOSIS — H66.006 RECURRENT ACUTE SUPPURATIVE OTITIS MEDIA WITHOUT SPONTANEOUS RUPTURE OF TYMPANIC MEMBRANE OF BOTH SIDES: ICD-10-CM

## 2022-09-20 DIAGNOSIS — H65.33 CHRONIC MUCOID OTITIS MEDIA OF BOTH EARS: ICD-10-CM

## 2022-09-20 DIAGNOSIS — H93.12 TINNITUS OF LEFT EAR: ICD-10-CM

## 2022-09-20 DIAGNOSIS — H90.0 CONDUCTIVE HEARING LOSS, BILATERAL: ICD-10-CM

## 2022-09-20 DIAGNOSIS — H92.12 OTORRHEA, LEFT: ICD-10-CM

## 2022-09-20 PROCEDURE — 31231 NASAL ENDOSCOPY DX: CPT | Performed by: OTOLARYNGOLOGY

## 2022-09-20 PROCEDURE — 99214 OFFICE O/P EST MOD 30 MIN: CPT | Performed by: NURSE PRACTITIONER

## 2022-09-20 RX ORDER — TRIAMTERENE AND HYDROCHLOROTHIAZIDE 37.5; 25 MG/1; MG/1
1 CAPSULE ORAL EVERY MORNING
COMMUNITY
Start: 2022-08-26

## 2022-09-20 NOTE — PATIENT INSTRUCTIONS
FUNCTIONAL ENDOSCOPIC SINUS SURGERY: A functional endoscopic sinus surgery was recommended. The risks and benefits were explained including but not limited to pain, bleeding (with the possible need for nasal packing), infection, risks of the general anesthesia, orbital injury with blurred vision or visual loss, cerebrospinal fluid leak, persistent disease, scarring, synichiae and the possibility for the need of reoperation. Possibilities of additional sinus work or less sinus work depending on the status of the nose at the time of the operation was discussed. Alternatives were discussed. No guarantees were made or implied. Questions were asked appropriately answered.

## 2022-09-20 NOTE — PROGRESS NOTES
PROCEDURE NOTE    Shante Bowie    DATE OF PROCEDURE: 9/20/2022    PROCEDURE:   Bilateral Diagnostic Rigid Nasal Endoscopy    PREPROCEDURE DIAGNOSIS:   Chronic rhinosinusitis with left-sided nasal congestion    POSTPROCEDURE DIAGNOSIS:  SAME    ANESTHESIA:   Same    PROCEDURE DESCRIPTION:    With the patient in the chair bilateral diagnostic rigid nasal endoscopy was performed with the Stortz 0° endoscope without difficulty.     An endoscope was passed along the left nasal floor to the nasopharynx.  It was then passed into the region of the middle meatus, middle turbinate, and the sphenoethmoid region. An identical procedure was performed on the right side.  The following findings were noted as stated below:    Findings: Bilateral inferior turbinate hypertrophy with moderate right septal deviation.  Left qian bullosa deformity with polypoid changes inferiorly arising from the turbinate.  Obstruction left middle meatus is noted with moderate edema but no purulent secretions.  Posteriorly moderate adenoid hypertrophy is noted.  Right greater than left.  Middle meatus on the right is normal in appearance.    Condition:  Stable.  Patient tolerated procedure well.    Complications:  None  There was no significant bleeding.

## 2022-10-07 RX ORDER — CIPROFLOXACIN AND DEXAMETHASONE 3; 1 MG/ML; MG/ML
SUSPENSION/ DROPS AURICULAR (OTIC)
Qty: 7.5 ML | Refills: 0 | OUTPATIENT
Start: 2022-10-07

## 2022-10-11 PROBLEM — J34.2 NASAL SEPTAL DEVIATION: Status: ACTIVE | Noted: 2022-10-11

## 2022-10-11 PROBLEM — J33.9 NASAL POLYP: Status: ACTIVE | Noted: 2022-10-11

## 2022-10-11 PROBLEM — J34.89 CONCHA BULLOSA: Status: ACTIVE | Noted: 2022-10-11

## 2022-10-19 ENCOUNTER — TELEPHONE (OUTPATIENT)
Dept: OTOLARYNGOLOGY | Facility: CLINIC | Age: 37
End: 2022-10-19

## 2022-10-19 RX ORDER — AMOXICILLIN 500 MG/1
500 CAPSULE ORAL 3 TIMES DAILY
Qty: 30 CAPSULE | Refills: 0 | Status: SHIPPED | OUTPATIENT
Start: 2022-10-19 | End: 2022-10-29

## 2022-10-19 NOTE — TELEPHONE ENCOUNTER
Patient called and has a sinus infection per Negrito Orellana APRN we will provide an abx for her

## 2022-10-24 ENCOUNTER — PRE-ADMISSION TESTING (OUTPATIENT)
Dept: PREADMISSION TESTING | Facility: HOSPITAL | Age: 37
End: 2022-10-24

## 2022-10-24 ENCOUNTER — HOSPITAL ENCOUNTER (OUTPATIENT)
Dept: GENERAL RADIOLOGY | Facility: HOSPITAL | Age: 37
Discharge: HOME | End: 2022-10-24

## 2022-10-24 VITALS
BODY MASS INDEX: 32.51 KG/M2 | WEIGHT: 172.18 LBS | HEIGHT: 61 IN | DIASTOLIC BLOOD PRESSURE: 93 MMHG | OXYGEN SATURATION: 100 % | HEART RATE: 91 BPM | RESPIRATION RATE: 18 BRPM | SYSTOLIC BLOOD PRESSURE: 151 MMHG

## 2022-10-24 DIAGNOSIS — H66.006 RECURRENT ACUTE SUPPURATIVE OTITIS MEDIA WITHOUT SPONTANEOUS RUPTURE OF TYMPANIC MEMBRANE OF BOTH SIDES: ICD-10-CM

## 2022-10-24 DIAGNOSIS — J34.2 NASAL SEPTAL DEVIATION: ICD-10-CM

## 2022-10-24 DIAGNOSIS — J33.9 NASAL POLYP: ICD-10-CM

## 2022-10-24 DIAGNOSIS — H90.0 CONDUCTIVE HEARING LOSS, BILATERAL: ICD-10-CM

## 2022-10-24 DIAGNOSIS — J34.89 CONCHA BULLOSA: ICD-10-CM

## 2022-10-24 LAB
ALBUMIN SERPL-MCNC: 4.4 G/DL (ref 3.5–5.2)
ALBUMIN/GLOB SERPL: 1.4 G/DL
ALP SERPL-CCNC: 70 U/L (ref 39–117)
ALT SERPL W P-5'-P-CCNC: 27 U/L (ref 1–33)
ANION GAP SERPL CALCULATED.3IONS-SCNC: 11 MMOL/L (ref 5–15)
APTT PPP: 28.3 SECONDS (ref 24.1–35)
AST SERPL-CCNC: 26 U/L (ref 1–32)
BASOPHILS # BLD AUTO: 0.05 10*3/MM3 (ref 0–0.2)
BASOPHILS NFR BLD AUTO: 0.6 % (ref 0–1.5)
BILIRUB SERPL-MCNC: 0.2 MG/DL (ref 0–1.2)
BUN SERPL-MCNC: 20 MG/DL (ref 6–20)
BUN/CREAT SERPL: 22.5 (ref 7–25)
CALCIUM SPEC-SCNC: 9.5 MG/DL (ref 8.6–10.5)
CHLORIDE SERPL-SCNC: 100 MMOL/L (ref 98–107)
CO2 SERPL-SCNC: 24 MMOL/L (ref 22–29)
CREAT SERPL-MCNC: 0.89 MG/DL (ref 0.57–1)
DEPRECATED RDW RBC AUTO: 44.7 FL (ref 37–54)
EGFRCR SERPLBLD CKD-EPI 2021: 85.8 ML/MIN/1.73
EOSINOPHIL # BLD AUTO: 0.49 10*3/MM3 (ref 0–0.4)
EOSINOPHIL NFR BLD AUTO: 6.1 % (ref 0.3–6.2)
ERYTHROCYTE [DISTWIDTH] IN BLOOD BY AUTOMATED COUNT: 13 % (ref 12.3–15.4)
GLOBULIN UR ELPH-MCNC: 3.1 GM/DL
GLUCOSE SERPL-MCNC: 130 MG/DL (ref 65–99)
HCT VFR BLD AUTO: 40.9 % (ref 34–46.6)
HGB BLD-MCNC: 13.4 G/DL (ref 12–15.9)
IMM GRANULOCYTES # BLD AUTO: 0.02 10*3/MM3 (ref 0–0.05)
IMM GRANULOCYTES NFR BLD AUTO: 0.2 % (ref 0–0.5)
INR PPP: 1.01 (ref 0.91–1.09)
LYMPHOCYTES # BLD AUTO: 2.12 10*3/MM3 (ref 0.7–3.1)
LYMPHOCYTES NFR BLD AUTO: 26.2 % (ref 19.6–45.3)
MCH RBC QN AUTO: 30.7 PG (ref 26.6–33)
MCHC RBC AUTO-ENTMCNC: 32.8 G/DL (ref 31.5–35.7)
MCV RBC AUTO: 93.6 FL (ref 79–97)
MONOCYTES # BLD AUTO: 0.69 10*3/MM3 (ref 0.1–0.9)
MONOCYTES NFR BLD AUTO: 8.5 % (ref 5–12)
NEUTROPHILS NFR BLD AUTO: 4.71 10*3/MM3 (ref 1.7–7)
NEUTROPHILS NFR BLD AUTO: 58.4 % (ref 42.7–76)
NRBC BLD AUTO-RTO: 0 /100 WBC (ref 0–0.2)
PLATELET # BLD AUTO: 347 10*3/MM3 (ref 140–450)
PMV BLD AUTO: 8.9 FL (ref 6–12)
POTASSIUM SERPL-SCNC: 4.1 MMOL/L (ref 3.5–5.2)
PROT SERPL-MCNC: 7.5 G/DL (ref 6–8.5)
PROTHROMBIN TIME: 12.9 SECONDS (ref 11.9–14.6)
RBC # BLD AUTO: 4.37 10*6/MM3 (ref 3.77–5.28)
SODIUM SERPL-SCNC: 135 MMOL/L (ref 136–145)
WBC NRBC COR # BLD: 8.08 10*3/MM3 (ref 3.4–10.8)

## 2022-10-24 PROCEDURE — 93010 ELECTROCARDIOGRAM REPORT: CPT | Performed by: INTERNAL MEDICINE

## 2022-10-24 PROCEDURE — 85610 PROTHROMBIN TIME: CPT

## 2022-10-24 PROCEDURE — 93005 ELECTROCARDIOGRAM TRACING: CPT

## 2022-10-24 PROCEDURE — 85025 COMPLETE CBC W/AUTO DIFF WBC: CPT

## 2022-10-24 PROCEDURE — 80053 COMPREHEN METABOLIC PANEL: CPT

## 2022-10-24 PROCEDURE — 85730 THROMBOPLASTIN TIME PARTIAL: CPT

## 2022-10-24 PROCEDURE — 36415 COLL VENOUS BLD VENIPUNCTURE: CPT

## 2022-10-24 PROCEDURE — 71046 X-RAY EXAM CHEST 2 VIEWS: CPT

## 2022-10-24 NOTE — DISCHARGE INSTRUCTIONS
Before you come to the hospital        Arrival time: AS DIRECTED BY OFFICE     YOU MAY TAKE THE FOLLOWING MEDICATION(S) THE MORNING OF SURGERY WITH A SIP OF WATER: BUSPAR, LABETALOL       ***PLEASE HOLD YOUR DYAZIDE 24 HOURS PRIOR TO SURGERY***             ALL OTHER HOME MEDICATION CHECK WITH YOUR PHYSICIAN (especially if you are taking diabetes medicines or blood thinners)    Do not take any Erectile Dysfunction medications (EX: CIALIS, VIAGRA) 24 hours prior to surgery.      If you were given and instructed to use a germ- killing soap, use as directed the night before surgery and again the morning of surgery or as directed by your surgeon.    (See attached information for How to Use Chlorhexidine for Bathing if applicable.)            Eating and drinking restrictions prior to scheduled arrival time    2 Hours before arrival time STOP   Drinking Clear liquids (water, apple juice-no pulp)     6 Hours before arrival time STOP   Milk or drinks that contain milk, full liquids    6 Hours before arrival time STOP   Light meals or foods, such as toast or cereal    8 Hours before arrival time STOP   Heavy foods, such as meat, fried foods, or fatty foods    (It is extremely important that you follow these guidelines to prevent delay or cancelation of your procedure)     Clear Liquids  Water and flavored water                                                                      Clear Fruit juices, such as cranberry juice and apple juice.  Black coffee (NO cream of any kind, including powdered).  Plain tea  Clear bouillon or broth.  Flavored gelatin.  Soda.  Gatorade or Powerade.  Full liquid examples  Juices that have pulp.  Frozen ice pops that contain fruit pieces.  Coffee with creamer  Milk.  Yogurt.                MANAGING PAIN AFTER SURGERY    We know you are probably wondering what your pain will be like after surgery.  Following surgery it is unrealistic to expect you will not have pain.   Pain is how our bodies let  us know that something is wrong or cautions us to be careful.  That said, our goal is to make your pain tolerable.    Methods we may use to treat your pain include (oral or IV medications, PCAs, epidurals, nerve blocks, etc.)   While some procedures require IV pain medications for a short time after surgery, transitioning to pain medications by mouth allows for better management of pain.   Your nurse will encourage you to take oral pain medications whenever possible.  IV medications work almost immediately, but only last a short while.  Taking medications by mouth allows for a more constant level of medication in your blood stream for a longer period of time.      Once your pain is out of control it is harder to get back under control.  It is important you are aware when your next dose of pain medication is due.  If you are admitted, your nurse may write the time of your next dose on the white board in your room to help you remember.      We are interested in your pain and encourage you to inform us about aggravating factors during your visit.   Many times a simple repositioning every few hours can make a big difference.    If your physician says it is okay, do not let your pain prevent you from getting out of bed. Be sure to call your nurse for assistance prior to getting up so you do not fall.      Before surgery, please decide your tolerable pain goal.  These faces help describe the pain ratings we use on a 0-10 scale.   Be prepared to tell us your goal and whether or not you take pain or anxiety medications at home.          Preparing for Surgery  Preparing for surgery is an important part of your care. It can make things go more smoothly and help you avoid complications. The steps leading up to surgery may vary among hospitals. Follow all instructions given to you by your health care providers. Ask questions if you do not understand something. Talk about any concerns that you have.  Here are some questions to  consider asking before your surgery:  If my surgery is not an emergency (is elective), when would be the best time to have the surgery?  What arrangements do I need to make for work, home, or school?  What will my recovery be like? How long will it be before I can return to normal activities?  Will I need to prepare my home? Will I need to arrange care for me or my children?  Should I expect to have pain after surgery? What are my pain management options? Are there nonmedical options that I can try for pain?  Tell a health care provider about:  Any allergies you have.  All medicines you are taking, including vitamins, herbs, eye drops, creams, and over-the-counter medicines.  Any problems you or family members have had with anesthetic medicines.  Any blood disorders you have.  Any surgeries you have had.  Any medical conditions you have.  Whether you are pregnant or may be pregnant.  What are the risks?  The risks and complications of surgery depend on the specific procedure that you have. Discuss all the risks with your health care providers before your surgery. Ask about common surgical complications, which may include:  Infection.  Bleeding or a need for blood replacement (transfusion).  Allergic reactions to medicines.  Damage to surrounding nerves, tissues, or structures.  A blood clot.  Scarring.  Failure of the surgery to correct the problem.  Follow these instructions before the procedure:  Several days or weeks before your procedure  You may have a physical exam by your primary health care provider to make sure it is safe for you to have surgery.  You may have testing. This may include a chest X-ray, blood and urine tests, electrocardiogram (ECG), or other testing.  Ask your health care provider about:  Changing or stopping your regular medicines. This is especially important if you are taking diabetes medicines or blood thinners.  Taking medicines such as aspirin and ibuprofen. These medicines can thin  your blood. Do not take these medicines unless your health care provider tells you to take them.  Taking over-the-counter medicines, vitamins, herbs, and supplements.  Do not use any products that contain nicotine or tobacco, such as cigarettes and e-cigarettes. If you need help quitting, ask your health care provider.  Avoid alcohol.  Ask your health care provider if there are exercises you can do to prepare for surgery.  Eat a healthy diet.   Plan to have someone take you home from the hospital or clinic.  Plan to have a responsible adult care for you for at least 24 hours after you leave the hospital or clinic. This is important.  The day before your procedure  You may be given antibiotic medicine to take by mouth to help prevent infection. Take it as told by your health care provider.  You may be asked to shower with a germ-killing soap.  Follow instructions from your health care provider about eating and drinking restrictions. This includes gum, mints and hard candy.  Pack comfortable clothes according to your procedure.   The day of your procedure  You may need to take another shower with a germ-killing soap before you leave home in the morning.  With a small sip of water, take only the medicines that you are told to take.  Remove all jewelry including rings.   Leave anything you consider valuable at home except hearing aids if needed.  Do not wear any makeup, nail polish, powder, deodorant, lotion, hair accessories, or anything on your skin or body except your clothes.  If you will be staying in the hospital, bring a case to hold your glasses, contacts, or dentures. You may also want to bring your robe and non-skid footwear.  If you wear oxygen at home, bring it with you the day of surgery.  If instructed by your health care provider, bring your sleep apnea device with you on the day of your surgery (if this applies to you).  You may want to leave your suitcase and sleep apnea device in the car until after  surgery.   Arrive at the hospital as scheduled.  Bring a friend or family member with you who can help to answer questions and be present while you meet with your health care provider.  At the hospital  When you arrive at the hospital:  Go to registration located at the main entrance of the hospital. You will be registered and given a beeper and a sticker sheet. Take the stickers to the Outpatient nurses desk and place in the black tray. This is to notify staff that you have arrived. Then return to the lobby to wait.   When your beeper lights up and vibrates proceed through the double doors, under the stairs, and a member of the Outpatient Surgery staff will escort you to your preoperative room.  You may have to wear compression sleeves. These help to prevent blood clots and reduce swelling in your legs.  An IV may be inserted into one of your veins.              In the operating room, you may be given one or more of the following:        A medicine to help you relax (sedative).        A medicine to numb the area (local anesthetic).        A medicine to make you fall asleep (general anesthetic).        A medicine that is injected into an area of your body to numb everything below the                      injection site (regional anesthetic).  You may be given an antibiotic through your IV to help prevent infection.  Your surgical site will be marked or identified.    Contact a health care provider if you:  Develop a fever of more than 100.4°F (38°C) or other feelings of illness during the 48 hours before your surgery.  Have symptoms that get worse.  Have questions or concerns about your surgery.  Summary  Preparing for surgery can make the procedure go more smoothly and lower your risk of complications.  Before surgery, make a list of questions and concerns to discuss with your surgeon. Ask about the risks and possible complications.  In the days or weeks before your surgery, follow all instructions from your health  care provider. You may need to stop smoking, avoid alcohol, follow eating restrictions, and change or stop your regular medicines.  Contact your surgeon if you develop a fever or other signs of illness during the few days before your surgery.  This information is not intended to replace advice given to you by your health care provider. Make sure you discuss any questions you have with your health care provider.  Document Revised: 12/21/2018 Document Reviewed: 10/23/2018  [employer] Patient Education © 2021 [employer]

## 2022-10-25 LAB
QT INTERVAL: 376 MS
QTC INTERVAL: 459 MS

## 2022-10-31 ENCOUNTER — ANESTHESIA (OUTPATIENT)
Dept: PERIOP | Facility: HOSPITAL | Age: 37
End: 2022-10-31

## 2022-10-31 ENCOUNTER — ANESTHESIA EVENT (OUTPATIENT)
Dept: PERIOP | Facility: HOSPITAL | Age: 37
End: 2022-10-31

## 2022-10-31 ENCOUNTER — HOSPITAL ENCOUNTER (OUTPATIENT)
Facility: HOSPITAL | Age: 37
Setting detail: HOSPITAL OUTPATIENT SURGERY
Discharge: HOME | End: 2022-10-31
Attending: OTOLARYNGOLOGY | Admitting: OTOLARYNGOLOGY

## 2022-10-31 VITALS
SYSTOLIC BLOOD PRESSURE: 139 MMHG | RESPIRATION RATE: 14 BRPM | HEART RATE: 87 BPM | OXYGEN SATURATION: 97 % | TEMPERATURE: 97.7 F | DIASTOLIC BLOOD PRESSURE: 76 MMHG

## 2022-10-31 DIAGNOSIS — H66.006 RECURRENT ACUTE SUPPURATIVE OTITIS MEDIA WITHOUT SPONTANEOUS RUPTURE OF TYMPANIC MEMBRANE OF BOTH SIDES: ICD-10-CM

## 2022-10-31 DIAGNOSIS — J34.89 CONCHA BULLOSA: ICD-10-CM

## 2022-10-31 DIAGNOSIS — J34.2 NASAL SEPTAL DEVIATION: ICD-10-CM

## 2022-10-31 DIAGNOSIS — H90.0 CONDUCTIVE HEARING LOSS, BILATERAL: ICD-10-CM

## 2022-10-31 DIAGNOSIS — J33.9 NASAL POLYP: Primary | ICD-10-CM

## 2022-10-31 LAB
B-HCG UR QL: NEGATIVE
GLUCOSE BLDC GLUCOMTR-MCNC: 134 MG/DL (ref 70–130)
GLUCOSE BLDC GLUCOMTR-MCNC: 147 MG/DL (ref 70–130)

## 2022-10-31 PROCEDURE — C9046 COCAINE HCL NASAL SOLUTION: HCPCS | Performed by: OTOLARYNGOLOGY

## 2022-10-31 PROCEDURE — 25010000002 FENTANYL CITRATE (PF) 100 MCG/2ML SOLUTION: Performed by: NURSE ANESTHETIST, CERTIFIED REGISTERED

## 2022-10-31 PROCEDURE — 25010000002 ONDANSETRON PER 1 MG: Performed by: NURSE ANESTHETIST, CERTIFIED REGISTERED

## 2022-10-31 PROCEDURE — 88305 TISSUE EXAM BY PATHOLOGIST: CPT | Performed by: OTOLARYNGOLOGY

## 2022-10-31 PROCEDURE — C1763 CONN TISS, NON-HUMAN: HCPCS | Performed by: OTOLARYNGOLOGY

## 2022-10-31 PROCEDURE — 30802 ABLATE INF TURBINATE SUBMUC: CPT | Performed by: OTOLARYNGOLOGY

## 2022-10-31 PROCEDURE — 30520 REPAIR OF NASAL SEPTUM: CPT | Performed by: OTOLARYNGOLOGY

## 2022-10-31 PROCEDURE — 81025 URINE PREGNANCY TEST: CPT | Performed by: OTOLARYNGOLOGY

## 2022-10-31 PROCEDURE — 31267 ENDOSCOPY MAXILLARY SINUS: CPT | Performed by: OTOLARYNGOLOGY

## 2022-10-31 PROCEDURE — 88311 DECALCIFY TISSUE: CPT | Performed by: OTOLARYNGOLOGY

## 2022-10-31 PROCEDURE — 82962 GLUCOSE BLOOD TEST: CPT

## 2022-10-31 PROCEDURE — 25010000002 COCAINE HCL 40 MG/ML SOLUTION: Performed by: OTOLARYNGOLOGY

## 2022-10-31 PROCEDURE — 69620 MYRINGOPLASTY: CPT | Performed by: OTOLARYNGOLOGY

## 2022-10-31 PROCEDURE — 42831 REMOVAL OF ADENOIDS: CPT | Performed by: OTOLARYNGOLOGY

## 2022-10-31 PROCEDURE — C2625 STENT, NON-COR, TEM W/DEL SY: HCPCS | Performed by: OTOLARYNGOLOGY

## 2022-10-31 PROCEDURE — 31254 NSL/SINS NDSC W/PRTL ETHMDCT: CPT | Performed by: OTOLARYNGOLOGY

## 2022-10-31 PROCEDURE — 31240 NSL/SNS NDSC CNCH BULL RESCJ: CPT | Performed by: OTOLARYNGOLOGY

## 2022-10-31 PROCEDURE — 25010000002 PROPOFOL 10 MG/ML EMULSION: Performed by: NURSE ANESTHETIST, CERTIFIED REGISTERED

## 2022-10-31 DEVICE — HEMOST ABS SURGIFOAM SZ12/7 2X6 7MM: Type: IMPLANTABLE DEVICE | Site: EAR | Status: FUNCTIONAL

## 2022-10-31 DEVICE — PROPEL MINI SINUS IMPLANT
Type: IMPLANTABLE DEVICE | Site: EAR | Status: FUNCTIONAL
Brand: PROPEL MINI

## 2022-10-31 DEVICE — PROPEL CONTOUR SINUS IMPLANT
Type: IMPLANTABLE DEVICE | Site: EAR | Status: FUNCTIONAL
Brand: PROPEL CONTOUR

## 2022-10-31 DEVICE — STPLR SEPTL ENTACT .5X3MM: Type: IMPLANTABLE DEVICE | Site: EAR | Status: FUNCTIONAL

## 2022-10-31 DEVICE — PTCH OTOLOGIC EPIFILM LAMINA: Type: IMPLANTABLE DEVICE | Site: EAR | Status: FUNCTIONAL

## 2022-10-31 DEVICE — SEAL HEMO SURG ARISTA/AH ABS/PWDR 3GM: Type: IMPLANTABLE DEVICE | Site: EAR | Status: FUNCTIONAL

## 2022-10-31 RX ORDER — ONDANSETRON 2 MG/ML
INJECTION INTRAMUSCULAR; INTRAVENOUS AS NEEDED
Status: DISCONTINUED | OUTPATIENT
Start: 2022-10-31 | End: 2022-10-31 | Stop reason: SURG

## 2022-10-31 RX ORDER — SODIUM CHLORIDE, SODIUM LACTATE, POTASSIUM CHLORIDE, CALCIUM CHLORIDE 600; 310; 30; 20 MG/100ML; MG/100ML; MG/100ML; MG/100ML
100 INJECTION, SOLUTION INTRAVENOUS CONTINUOUS
Status: DISCONTINUED | OUTPATIENT
Start: 2022-10-31 | End: 2022-10-31 | Stop reason: HOSPADM

## 2022-10-31 RX ORDER — HYDROCODONE BITARTRATE AND ACETAMINOPHEN 5; 325 MG/1; MG/1
1 TABLET ORAL ONCE AS NEEDED
Status: DISCONTINUED | OUTPATIENT
Start: 2022-10-31 | End: 2022-10-31 | Stop reason: HOSPADM

## 2022-10-31 RX ORDER — OXYMETAZOLINE HYDROCHLORIDE 0.05 G/100ML
2 SPRAY NASAL
Status: COMPLETED | OUTPATIENT
Start: 2022-10-31 | End: 2022-10-31

## 2022-10-31 RX ORDER — BALANCED SALT SOLUTION ENRICHED WITH BICARBONATE, DEXTROSE, AND GLUTATHIONE
KIT INTRAOCULAR AS NEEDED
Status: DISCONTINUED | OUTPATIENT
Start: 2022-10-31 | End: 2022-10-31 | Stop reason: HOSPADM

## 2022-10-31 RX ORDER — LIDOCAINE HYDROCHLORIDE 10 MG/ML
0.5 INJECTION, SOLUTION EPIDURAL; INFILTRATION; INTRACAUDAL; PERINEURAL ONCE AS NEEDED
Status: DISCONTINUED | OUTPATIENT
Start: 2022-10-31 | End: 2022-10-31 | Stop reason: HOSPADM

## 2022-10-31 RX ORDER — COCAINE HYDROCHLORIDE 40 MG/ML
SOLUTION NASAL
Status: DISCONTINUED
Start: 2022-10-31 | End: 2022-10-31 | Stop reason: HOSPADM

## 2022-10-31 RX ORDER — SODIUM CHLORIDE, SODIUM LACTATE, POTASSIUM CHLORIDE, CALCIUM CHLORIDE 600; 310; 30; 20 MG/100ML; MG/100ML; MG/100ML; MG/100ML
INJECTION, SOLUTION INTRAVENOUS CONTINUOUS PRN
Status: COMPLETED | OUTPATIENT
Start: 2022-10-31 | End: 2022-10-31

## 2022-10-31 RX ORDER — PROPOFOL 10 MG/ML
VIAL (ML) INTRAVENOUS AS NEEDED
Status: DISCONTINUED | OUTPATIENT
Start: 2022-10-31 | End: 2022-10-31 | Stop reason: SURG

## 2022-10-31 RX ORDER — DROPERIDOL 2.5 MG/ML
0.62 INJECTION, SOLUTION INTRAMUSCULAR; INTRAVENOUS ONCE AS NEEDED
Status: DISCONTINUED | OUTPATIENT
Start: 2022-10-31 | End: 2022-10-31 | Stop reason: HOSPADM

## 2022-10-31 RX ORDER — OXYCODONE AND ACETAMINOPHEN 10; 325 MG/1; MG/1
1 TABLET ORAL ONCE AS NEEDED
Status: DISCONTINUED | OUTPATIENT
Start: 2022-10-31 | End: 2022-10-31 | Stop reason: HOSPADM

## 2022-10-31 RX ORDER — CIPROFLOXACIN 0.5 MG/.25ML
0.25 SOLUTION/ DROPS AURICULAR (OTIC) 2 TIMES DAILY
Qty: 4 ML | Refills: 0 | Status: SHIPPED | OUTPATIENT
Start: 2022-10-31 | End: 2022-11-10

## 2022-10-31 RX ORDER — ACETAMINOPHEN 500 MG
1000 TABLET ORAL ONCE
Status: COMPLETED | OUTPATIENT
Start: 2022-10-31 | End: 2022-10-31

## 2022-10-31 RX ORDER — LIDOCAINE HYDROCHLORIDE AND EPINEPHRINE 10; 10 MG/ML; UG/ML
INJECTION, SOLUTION INFILTRATION; PERINEURAL AS NEEDED
Status: DISCONTINUED | OUTPATIENT
Start: 2022-10-31 | End: 2022-10-31 | Stop reason: HOSPADM

## 2022-10-31 RX ORDER — SODIUM CHLORIDE, SODIUM LACTATE, POTASSIUM CHLORIDE, CALCIUM CHLORIDE 600; 310; 30; 20 MG/100ML; MG/100ML; MG/100ML; MG/100ML
1000 INJECTION, SOLUTION INTRAVENOUS CONTINUOUS
Status: DISCONTINUED | OUTPATIENT
Start: 2022-10-31 | End: 2022-10-31 | Stop reason: HOSPADM

## 2022-10-31 RX ORDER — FLUMAZENIL 0.1 MG/ML
0.2 INJECTION INTRAVENOUS AS NEEDED
Status: DISCONTINUED | OUTPATIENT
Start: 2022-10-31 | End: 2022-10-31 | Stop reason: HOSPADM

## 2022-10-31 RX ORDER — SODIUM CHLORIDE 0.9 % (FLUSH) 0.9 %
3 SYRINGE (ML) INJECTION EVERY 12 HOURS SCHEDULED
Status: DISCONTINUED | OUTPATIENT
Start: 2022-10-31 | End: 2022-10-31 | Stop reason: HOSPADM

## 2022-10-31 RX ORDER — MAGNESIUM HYDROXIDE 1200 MG/15ML
LIQUID ORAL AS NEEDED
Status: DISCONTINUED | OUTPATIENT
Start: 2022-10-31 | End: 2022-10-31 | Stop reason: HOSPADM

## 2022-10-31 RX ORDER — HYDROCODONE BITARTRATE AND ACETAMINOPHEN 5; 325 MG/1; MG/1
1-2 TABLET ORAL EVERY 4 HOURS PRN
Qty: 8 TABLET | Refills: 0 | Status: SHIPPED | OUTPATIENT
Start: 2022-10-31 | End: 2022-11-08

## 2022-10-31 RX ORDER — FENTANYL CITRATE 50 UG/ML
25 INJECTION, SOLUTION INTRAMUSCULAR; INTRAVENOUS
Status: DISCONTINUED | OUTPATIENT
Start: 2022-10-31 | End: 2022-10-31 | Stop reason: HOSPADM

## 2022-10-31 RX ORDER — SCOLOPAMINE TRANSDERMAL SYSTEM 1 MG/1
1 PATCH, EXTENDED RELEASE TRANSDERMAL ONCE
Status: DISCONTINUED | OUTPATIENT
Start: 2022-10-31 | End: 2022-10-31 | Stop reason: HOSPADM

## 2022-10-31 RX ORDER — ROCURONIUM BROMIDE 10 MG/ML
INJECTION, SOLUTION INTRAVENOUS AS NEEDED
Status: DISCONTINUED | OUTPATIENT
Start: 2022-10-31 | End: 2022-10-31 | Stop reason: SURG

## 2022-10-31 RX ORDER — OXYCODONE AND ACETAMINOPHEN 7.5; 325 MG/1; MG/1
2 TABLET ORAL EVERY 4 HOURS PRN
Status: DISCONTINUED | OUTPATIENT
Start: 2022-10-31 | End: 2022-10-31 | Stop reason: HOSPADM

## 2022-10-31 RX ORDER — ONDANSETRON 2 MG/ML
4 INJECTION INTRAMUSCULAR; INTRAVENOUS ONCE AS NEEDED
Status: DISCONTINUED | OUTPATIENT
Start: 2022-10-31 | End: 2022-10-31 | Stop reason: HOSPADM

## 2022-10-31 RX ORDER — AMOXICILLIN 500 MG/1
500 CAPSULE ORAL 3 TIMES DAILY
Qty: 30 CAPSULE | Refills: 0 | Status: SHIPPED | OUTPATIENT
Start: 2022-10-31 | End: 2022-11-10

## 2022-10-31 RX ORDER — IBUPROFEN 600 MG/1
600 TABLET ORAL ONCE AS NEEDED
Status: DISCONTINUED | OUTPATIENT
Start: 2022-10-31 | End: 2022-10-31 | Stop reason: HOSPADM

## 2022-10-31 RX ORDER — NALOXONE HCL 0.4 MG/ML
0.4 VIAL (ML) INJECTION AS NEEDED
Status: DISCONTINUED | OUTPATIENT
Start: 2022-10-31 | End: 2022-10-31 | Stop reason: HOSPADM

## 2022-10-31 RX ORDER — COCAINE HYDROCHLORIDE 40 MG/ML
SOLUTION NASAL AS NEEDED
Status: DISCONTINUED | OUTPATIENT
Start: 2022-10-31 | End: 2022-10-31 | Stop reason: HOSPADM

## 2022-10-31 RX ORDER — SODIUM CHLORIDE 0.9 % (FLUSH) 0.9 %
3-10 SYRINGE (ML) INJECTION AS NEEDED
Status: DISCONTINUED | OUTPATIENT
Start: 2022-10-31 | End: 2022-10-31 | Stop reason: HOSPADM

## 2022-10-31 RX ORDER — CIPROFLOXACIN AND DEXAMETHASONE 3; 1 MG/ML; MG/ML
SUSPENSION/ DROPS AURICULAR (OTIC) AS NEEDED
Status: DISCONTINUED | OUTPATIENT
Start: 2022-10-31 | End: 2022-10-31 | Stop reason: HOSPADM

## 2022-10-31 RX ORDER — WATER 1000 ML/1000ML
INJECTION, SOLUTION INTRAVENOUS AS NEEDED
Status: DISCONTINUED | OUTPATIENT
Start: 2022-10-31 | End: 2022-10-31 | Stop reason: HOSPADM

## 2022-10-31 RX ORDER — LABETALOL HYDROCHLORIDE 5 MG/ML
5 INJECTION, SOLUTION INTRAVENOUS
Status: DISCONTINUED | OUTPATIENT
Start: 2022-10-31 | End: 2022-10-31 | Stop reason: HOSPADM

## 2022-10-31 RX ORDER — FENTANYL CITRATE 50 UG/ML
INJECTION, SOLUTION INTRAMUSCULAR; INTRAVENOUS AS NEEDED
Status: DISCONTINUED | OUTPATIENT
Start: 2022-10-31 | End: 2022-10-31 | Stop reason: SURG

## 2022-10-31 RX ORDER — MIDAZOLAM HYDROCHLORIDE 1 MG/ML
1 INJECTION INTRAMUSCULAR; INTRAVENOUS
Status: DISCONTINUED | OUTPATIENT
Start: 2022-10-31 | End: 2022-10-31 | Stop reason: HOSPADM

## 2022-10-31 RX ORDER — SODIUM CHLORIDE 0.9 % (FLUSH) 0.9 %
3 SYRINGE (ML) INJECTION AS NEEDED
Status: DISCONTINUED | OUTPATIENT
Start: 2022-10-31 | End: 2022-10-31 | Stop reason: HOSPADM

## 2022-10-31 RX ADMIN — SODIUM CHLORIDE, POTASSIUM CHLORIDE, SODIUM LACTATE AND CALCIUM CHLORIDE: 600; 310; 30; 20 INJECTION, SOLUTION INTRAVENOUS at 10:48

## 2022-10-31 RX ADMIN — ONDANSETRON 4 MG: 2 INJECTION INTRAMUSCULAR; INTRAVENOUS at 10:31

## 2022-10-31 RX ADMIN — OXYMETAZOLINE HYDROCHLORIDE 2 SPRAY: 0.05 SPRAY NASAL at 09:13

## 2022-10-31 RX ADMIN — LIDOCAINE HYDROCHLORIDE 80 MG: 20 INJECTION, SOLUTION INTRAVENOUS at 09:44

## 2022-10-31 RX ADMIN — ACETAMINOPHEN 1000 MG: 500 TABLET ORAL at 09:11

## 2022-10-31 RX ADMIN — SUGAMMADEX 200 MG: 100 INJECTION, SOLUTION INTRAVENOUS at 10:51

## 2022-10-31 RX ADMIN — ROCURONIUM BROMIDE 50 MG: 10 INJECTION, SOLUTION INTRAVENOUS at 09:44

## 2022-10-31 RX ADMIN — SCOPALAMINE 1 PATCH: 1 PATCH, EXTENDED RELEASE TRANSDERMAL at 09:11

## 2022-10-31 RX ADMIN — PROPOFOL 200 MG: 10 INJECTION, EMULSION INTRAVENOUS at 09:44

## 2022-10-31 RX ADMIN — LIDOCAINE HYDROCHLORIDE 80 MG: 20 INJECTION, SOLUTION INTRAVENOUS at 10:51

## 2022-10-31 RX ADMIN — SODIUM CHLORIDE, POTASSIUM CHLORIDE, SODIUM LACTATE AND CALCIUM CHLORIDE: 600; 310; 30; 20 INJECTION, SOLUTION INTRAVENOUS at 09:34

## 2022-10-31 RX ADMIN — FENTANYL CITRATE 100 MCG: 50 INJECTION, SOLUTION INTRAMUSCULAR; INTRAVENOUS at 09:44

## 2022-10-31 NOTE — ANESTHESIA PROCEDURE NOTES
Airway  Urgency: elective    Airway not difficult    General Information and Staff    Patient location during procedure: OR  CRNA/CAA: Eye, Mary, CRNA    Indications and Patient Condition  Indications for airway management: airway protection    Preoxygenated: yes  Mask difficulty assessment: 3 - difficult mask (inadequate, unstable or two providers) +/- NMBA    Final Airway Details  Final airway type: endotracheal airway      Successful airway: ETT  Cuffed: yes   Successful intubation technique: video laryngoscopy  Facilitating devices/methods: Bougie  Endotracheal tube insertion site: oral  Blade: Mancia  Blade size: 3  ETT size (mm): 7.0  Cormack-Lehane Classification: grade IIb - view of arytenoids or posterior of glottis only  Placement verified by: chest auscultation and capnometry   Cuff volume (mL): 5  Measured from: lips  ETT/EBT  to lips (cm): 21  Number of attempts at approach: 1  Assessment: lips, teeth, and gum same as pre-op and atraumatic intubation    Additional Comments  Difficult intubation with Mancia, limited view, unable to pass ETT with stylet, bougie placed followed easily by ETT

## 2022-10-31 NOTE — ANESTHESIA PREPROCEDURE EVALUATION
Anesthesia Evaluation     Patient summary reviewed   history of anesthetic complications: difficult airway  NPO Solid Status: > 8 hours             Airway   Mallampati: III  TM distance: >3 FB  Neck ROM: full  Possible difficult intubation  Dental      Pulmonary    (-) COPD, asthma, sleep apnea, not a smoker  Cardiovascular   Exercise tolerance: excellent (>7 METS)    (+) hypertension,   (-) pacemaker, past MI, angina, cardiac stents      Neuro/Psych  (-) seizures, TIA, CVA  GI/Hepatic/Renal/Endo    (+) obesity,   diabetes mellitus type 2,   (-) GERD, liver disease, no renal disease    Musculoskeletal     Abdominal   (+) obese,    Substance History      OB/GYN    (-)  Pregnant        Other                          Anesthesia Plan    ASA 2     general     (Reports unable to be intubated at [place] June 2022; )  intravenous induction     Anesthetic plan, risks, benefits, and alternatives have been provided, discussed and informed consent has been obtained with: patient.

## 2022-10-31 NOTE — ANESTHESIA POSTPROCEDURE EVALUATION
Patient: Shante Bowie    Procedure Summary     Date: 10/31/22 Room / Location:  [facility]  /  [facility]    Anesthesia Start: 0940 Anesthesia Stop: 1104    Procedures:       IMAGE GUIDED SEPTOPLASTY, RESECTION INFERIOR TURBINATES, LEFT ADRIÁN BULLOSA RESECTION WITH LEFT ANTROSTOMY, WITH ADENOIDECTOMY, MYRINGOPLASTY WITH LEFT T TUBE PLACEMENT (Nose)      MYRINGOPLASTY WITH LEFT T TUBE PLACEMENT (Left: Ear) Diagnosis:       Recurrent acute suppurative otitis media without spontaneous rupture of tympanic membrane of both sides      Conductive hearing loss, bilateral      Adrián bullosa      Nasal septal deviation      Nasal polyp      (Recurrent acute suppurative otitis media without spontaneous rupture of tympanic membrane of both sides [H66.006])      (Conductive hearing loss, bilateral [H90.0])      (Adrián bullosa [J34.89])      (Nasal septal deviation [J34.2])      (Nasal polyp [J33.9])    Surgeons: Lazarus Clark MD Provider: Mary Ponce CRNA    Anesthesia Type: general ASA Status: 2          Anesthesia Type: general    Vitals  Vitals Value Taken Time   /80 10/31/22 1230   Temp 97.7 °F (36.5 °C) 10/31/22 1230   Pulse 84 10/31/22 1237   Resp 14 10/31/22 1230   SpO2 96 % 10/31/22 1237   Vitals shown include unvalidated device data.        Post Anesthesia Care and Evaluation    Patient location during evaluation: PACU  Patient participation: complete - patient participated  Level of consciousness: awake and alert  Pain management: adequate    Airway patency: patent  Anesthetic complications: No anesthetic complications    Cardiovascular status: acceptable  Respiratory status: acceptable  Hydration status: acceptable    Comments: Blood pressure 139/76, pulse 87, temperature 97.7 °F (36.5 °C), temperature source Temporal, resp. rate 14, last menstrual period 10/07/2022, SpO2 97 %, not currently breastfeeding.    Pt discharged from PACU based on kalia score >8

## 2022-11-01 LAB
CYTO UR: NORMAL
LAB AP CASE REPORT: NORMAL
Lab: NORMAL
PATH REPORT.FINAL DX SPEC: NORMAL
PATH REPORT.GROSS SPEC: NORMAL

## 2022-11-14 ENCOUNTER — OFFICE VISIT (OUTPATIENT)
Dept: OTOLARYNGOLOGY | Facility: CLINIC | Age: 37
End: 2022-11-14

## 2022-11-14 VITALS
TEMPERATURE: 98.7 F | HEART RATE: 106 BPM | BODY MASS INDEX: 32.47 KG/M2 | WEIGHT: 172 LBS | DIASTOLIC BLOOD PRESSURE: 92 MMHG | SYSTOLIC BLOOD PRESSURE: 145 MMHG | HEIGHT: 61 IN

## 2022-11-14 DIAGNOSIS — Z98.890 H/O MYRINGOPLASTY: ICD-10-CM

## 2022-11-14 DIAGNOSIS — Z98.890 S/P SINUS SURGERY: Primary | ICD-10-CM

## 2022-11-14 PROCEDURE — 99024 POSTOP FOLLOW-UP VISIT: CPT | Performed by: NURSE PRACTITIONER

## 2022-11-14 RX ORDER — BUPROPION HYDROCHLORIDE 150 MG/1
TABLET, EXTENDED RELEASE ORAL
COMMUNITY

## 2022-11-14 RX ORDER — CIPROFLOXACIN HYDROCHLORIDE 3.5 MG/ML
SOLUTION/ DROPS TOPICAL
COMMUNITY
Start: 2022-10-31

## 2022-11-14 RX ORDER — LABETALOL 100 MG/1
100 TABLET, FILM COATED ORAL
COMMUNITY
End: 2022-11-14 | Stop reason: SDUPTHER

## 2022-11-14 NOTE — PROGRESS NOTES
YOB: 1985  Location: [facility]  Location Address: [address] [place] [address], [facility], [address] [place] [address]  Location Phone: [phone]    Chief Complaint   Patient presents with   • Post-op       History of Present Illness  Shante Bowie is a 37 y.o. female.  Shante Bowie is here for follow up of ENT complaints. The patient is s/p septoplasty, bilateral inferior turbinate reduction, left qian bullosa resection with adenoidectomy and left  [id number]/  She has had a relatively normal postoperative course. She does complain of some minor ear fullness and tenderness to the nose left > right side.   She has been using mupirocin and saline spray as directed            Past Medical History:   Diagnosis Date   • Asthma     At least 5-10 years   • Depression    • Diabetic acidosis, type II (HCC)    • Ear infection    • Hard to intubate    • Hearing loss     frequent ear infections   • High blood pressure    • History of transfusion        • HL (hearing loss)    • Migraines    • Polycystic ovary syndrome    • Rosacea    • Sleep apnea     wears c pap       Past Surgical History:   Procedure Laterality Date   • ADENOIDECTOMY     • BREAST SURGERY Bilateral 2002    augmentation on left, reduction on right   • D & C AND LAPAROSCOPY     • DILATATION AND CURETTAGE      2022   • ENDOSCOPIC FUNCTIONAL SINUS SURGERY (FESS) N/A 10/31/2022    Procedure: IMAGE GUIDED SEPTOPLASTY, RESECTION INFERIOR TURBINATES, LEFT QIAN BULLOSA RESECTION WITH LEFT ANTROSTOMY, WITH ADENOIDECTOMY, MYRINGOPLASTY WITH LEFT T TUBE PLACEMENT;  Surgeon: Lazarus Clark MD;  Location: [facility];  Service: ENT;  Laterality: N/A;   • FEMUR FRACTURE SURGERY Left    • FOOT TENDON SURGERY Left    • KNEE ACL RECONSTRUCTION Left    • MYRINGOPLASTY Left 10/31/2022    Procedure: MYRINGOPLASTY WITH LEFT T TUBE PLACEMENT;  Surgeon: Lazarus Clark MD;  Location: [facility] OR;  Service: ENT;  Laterality: Left;   • MYRINGOTOMY W/ TUBES       pt states has had 22 sets if tubes   • MYRINGOTOMY W/ TUBES Bilateral 2021    Procedure: BILATERAL MYRINGOTOMY WITH INSERTION OF EAR T-TUBES;  Surgeon: Lazarus Clark MD;  Location: [facility];  Service: ENT;  Laterality: Bilateral;   • TONSILLECTOMY         Outpatient Medications Marked as Taking for the 22 encounter (Office Visit) with Dilia Rogers APRN   Medication Sig Dispense Refill   • azelastine (ASTELIN) 0.1 % nasal spray 2 sprays into the nostril(s) as directed by provider 2 (Two) Times a Day. Use in each nostril as directed 30 mL 11   • buPROPion SR (WELLBUTRIN SR) 150 MG 12 hr tablet bupropion HCl  mg tablet,12 hr sustained-release   Take 1 tablet every day by oral route in the morning.     • busPIRone (BUSPAR) 5 MG tablet Take 2 tablets by mouth 4 (Four) Times a Day As Needed.     • ciprofloxacin (CILOXAN) 0.3 % ophthalmic solution      • fluticasone (FLONASE) 50 MCG/ACT nasal spray 2 sprays into the nostril(s) as directed by provider Daily. 16 g 11   • glimepiride (AMARYL) 2 MG tablet Take 2 mg by mouth 2 (Two) Times a Day.     • labetalol (NORMODYNE) 100 MG tablet Take 200 mg by mouth 2 (Two) Times a Day.     • levocetirizine (XYZAL) 5 MG tablet Take 5 mg by mouth Every Evening.     • loratadine (CLARITIN) 10 MG tablet Take 10 mg by mouth Daily.     • [] mupirocin (BACTROBAN) 2 % nasal ointment into the nostril(s) as directed by provider 2 (Two) Times a Day for 14 days. Apply to the nose twice daily 3 g 0   • NON FORMULARY Take 80 mcg by mouth Daily. 4 capsules daily D-Delta-Inositol     • Prenatal Vit-Fe Fumarate-FA (PRENATAL VITAMINS PO) Take 1 tablet by mouth Daily.     • triamterene-hydrochlorothiazide (DYAZIDE) 37.5-25 MG per capsule Take 1 capsule by mouth Every Morning.     • [DISCONTINUED] buPROPion SR (WELLBUTRIN SR) 150 MG 12 hr tablet Take 1 tablet by mouth 2 (Two) Times a Day.         Gold-containing drug products    Family History   Problem Relation Age of  Onset   • Asthma Mother    • Rashes / Skin problems Mother    • Hypertension Father    • Deep vein thrombosis Father        Social History     Socioeconomic History   • Marital status:    Tobacco Use   • Smoking status: Never   • Smokeless tobacco: Never   Vaping Use   • Vaping Use: Never used   Substance and Sexual Activity   • Alcohol use: Not Currently     Comment: occ   • Drug use: Not Currently   • Sexual activity: Defer       Review of Systems   Constitutional: Negative.    HENT: Positive for congestion and sinus pressure. Negative for ear discharge and ear pain.        Vitals:    11/14/22 1503   BP: 145/92   Pulse: 106   Temp: 98.7 °F (37.1 °C)       Body mass index is 32.48 kg/m².    Objective     Physical Exam  Vitals reviewed.   Constitutional:       Appearance: Normal appearance. She is obese.   HENT:      Head: Normocephalic.      Right Ear: External ear normal.      Ears:      Comments: Right t tube   Left packing in place        Nose:      Comments: Postoperative changes noted with bilateral crusting        Mouth/Throat:      Lips: Pink.      Mouth: Mucous membranes are moist.      Pharynx: Uvula midline.   Neurological:      Mental Status: She is alert.     Nasal endoscopy    Date/Time: 11/15/2022 9:17 AM  Performed by: Dilia Rogers APRN  Authorized by: Dilia Rogers APRN     Procedure details:     Indications: post-operative debridement      Medication:  None    Instrument: rigid nasal endoscopy      Scope location: bilateral nare    Nasal cavity:     Right inferior turbinates: crusting and post-surgical changes      Left inferior turbinates: crusting and post-surgical changes    Post-procedure details:     Patient tolerance of procedure:  Tolerated well  Comments:      Moderate crusting and drainage cleared with suction   Patient tolerated well             Assessment & Plan   Diagnoses and all orders for this visit:    1. S/P sinus surgery (Primary)    2. H/O myringoplasty    Other  orders  -     $ Nasal / Sinus Endoscopy      * Surgery not found *  Orders Placed This Encounter   Procedures   • $ Nasal / Sinus Endoscopy     This order was created via procedure documentation     Order Specific Question:   Release to patient     Answer:   Routine Release     No follow-ups on file.       Patient Instructions   Call return for problems  Continue Ocean Spray as needed  Continue Bactroban as prescribed for 2 weeks  Resume intranasal steroid spray and intranasal antihistamine spray  Follow-up in 3 to 4 weeks

## 2022-11-15 PROCEDURE — 31237 NSL/SINS NDSC SURG BX POLYPC: CPT | Performed by: NURSE PRACTITIONER

## 2022-12-12 ENCOUNTER — OFFICE VISIT (OUTPATIENT)
Dept: OTOLARYNGOLOGY | Facility: CLINIC | Age: 37
End: 2022-12-12

## 2022-12-12 VITALS
HEIGHT: 61 IN | BODY MASS INDEX: 32.66 KG/M2 | TEMPERATURE: 97.8 F | WEIGHT: 173 LBS | DIASTOLIC BLOOD PRESSURE: 90 MMHG | HEART RATE: 96 BPM | SYSTOLIC BLOOD PRESSURE: 150 MMHG

## 2022-12-12 DIAGNOSIS — Z98.890 H/O MYRINGOPLASTY: ICD-10-CM

## 2022-12-12 DIAGNOSIS — Z98.890 S/P SINUS SURGERY: Primary | ICD-10-CM

## 2022-12-12 DIAGNOSIS — H69.83 DYSFUNCTION OF BOTH EUSTACHIAN TUBES: ICD-10-CM

## 2022-12-12 DIAGNOSIS — Z96.22 S/P MYRINGOTOMY WITH INSERTION OF TUBE: ICD-10-CM

## 2022-12-12 DIAGNOSIS — H90.0 CONDUCTIVE HEARING LOSS, BILATERAL: ICD-10-CM

## 2022-12-12 PROCEDURE — 99024 POSTOP FOLLOW-UP VISIT: CPT | Performed by: NURSE PRACTITIONER

## 2022-12-12 RX ORDER — ARIPIPRAZOLE 2 MG/1
TABLET ORAL
COMMUNITY
Start: 2022-12-05

## 2022-12-13 PROCEDURE — 31237 NSL/SINS NDSC SURG BX POLYPC: CPT | Performed by: NURSE PRACTITIONER

## 2022-12-13 NOTE — PROGRESS NOTES
YOB: 1985  Location: [facility]  Location Address: [address] [place] [address], [facility], [address] [place] [address]  Location Phone: [phone]    Chief Complaint   Patient presents with   • Sinus Problem   • Ear Problem     Right ear pain and pressure and feels full per patient        History of Present Illness  Shante Bowie is a 37 y.o. female.  Shante Bowie is here for follow up of ENT complaints. The patient is s/p septo, inferior turbinate reduction, left qian bullosa resection, adenoidectomy and left myringoplasty 10/31/2022  She has had a relatively normal postoperative course.   She does complain of some mild congestion and pressure and states she continues to have a foul smell in her nose           Past Medical History:   Diagnosis Date   • Asthma     At least 5-10 years   • Depression    • Diabetic acidosis, type II (HCC)    • Ear infection    • Hard to intubate    • Hearing loss     frequent ear infections   • High blood pressure    • History of transfusion        • HL (hearing loss)    • Migraines    • Polycystic ovary syndrome    • Rosacea    • Sleep apnea     wears c pap       Past Surgical History:   Procedure Laterality Date   • ADENOIDECTOMY     • BREAST SURGERY Bilateral     augmentation on left, reduction on right   • D & C AND LAPAROSCOPY     • DILATATION AND CURETTAGE      2022   • ENDOSCOPIC FUNCTIONAL SINUS SURGERY (FESS) N/A 10/31/2022    Procedure: IMAGE GUIDED SEPTOPLASTY, RESECTION INFERIOR TURBINATES, LEFT QIAN BULLOSA RESECTION WITH LEFT ANTROSTOMY, WITH ADENOIDECTOMY, MYRINGOPLASTY WITH LEFT T TUBE PLACEMENT;  Surgeon: Lazarus Clark MD;  Location: [facility];  Service: ENT;  Laterality: N/A;   • FEMUR FRACTURE SURGERY Left    • FOOT TENDON SURGERY Left    • KNEE ACL RECONSTRUCTION Left    • MYRINGOPLASTY Left 10/31/2022    Procedure: MYRINGOPLASTY WITH LEFT T TUBE PLACEMENT;  Surgeon: Lazarus Clark MD;  Location: [facility] OR;  Service: ENT;  Laterality: Left;    • MYRINGOTOMY W/ TUBES      pt states has had 22 sets if tubes   • MYRINGOTOMY W/ TUBES Bilateral 03/19/2021    Procedure: BILATERAL MYRINGOTOMY WITH INSERTION OF EAR T-TUBES;  Surgeon: Lazarus Clark MD;  Location: [facility];  Service: ENT;  Laterality: Bilateral;   • TONSILLECTOMY         Outpatient Medications Marked as Taking for the 12/12/22 encounter (Office Visit) with Dilia Rogers APRN   Medication Sig Dispense Refill   • azelastine (ASTELIN) 0.1 % nasal spray 2 sprays into the nostril(s) as directed by provider 2 (Two) Times a Day. Use in each nostril as directed 30 mL 11   • buPROPion SR (WELLBUTRIN SR) 150 MG 12 hr tablet bupropion HCl  mg tablet,12 hr sustained-release   Take 1 tablet every day by oral route in the morning.     • busPIRone (BUSPAR) 5 MG tablet Take 2 tablets by mouth 4 (Four) Times a Day As Needed.     • ciprofloxacin (CILOXAN) 0.3 % ophthalmic solution      • fluticasone (FLONASE) 50 MCG/ACT nasal spray 2 sprays into the nostril(s) as directed by provider Daily. 16 g 11   • glimepiride (AMARYL) 2 MG tablet Take 2 mg by mouth 2 (Two) Times a Day.     • labetalol (NORMODYNE) 100 MG tablet Take 200 mg by mouth 2 (Two) Times a Day.     • levocetirizine (XYZAL) 5 MG tablet Take 5 mg by mouth Every Evening.     • loratadine (CLARITIN) 10 MG tablet Take 10 mg by mouth Daily.     • NON FORMULARY Take 80 mcg by mouth Daily. 4 capsules daily D-Delta-Inositol     • Prenatal Vit-Fe Fumarate-FA (PRENATAL VITAMINS PO) Take 1 tablet by mouth Daily.     • triamterene-hydrochlorothiazide (DYAZIDE) 37.5-25 MG per capsule Take 1 capsule by mouth Every Morning.         Gold-containing drug products    Family History   Problem Relation Age of Onset   • Asthma Mother    • Rashes / Skin problems Mother    • Hypertension Father    • Deep vein thrombosis Father        Social History     Socioeconomic History   • Marital status:    Tobacco Use   • Smoking status: Never   • Smokeless  tobacco: Never   Vaping Use   • Vaping Use: Never used   Substance and Sexual Activity   • Alcohol use: Not Currently     Comment: occ   • Drug use: Not Currently   • Sexual activity: Defer       Review of Systems   Constitutional: Negative.    HENT: Positive for congestion, ear pain and sinus pressure.        Vitals:    12/12/22 1448   BP: 150/90   Pulse: 96   Temp: 97.8 °F (36.6 °C)       Body mass index is 32.67 kg/m².    Objective     Physical Exam  Vitals reviewed.   Constitutional:       Appearance: Normal appearance. She is obese.   HENT:      Head: Normocephalic.      Right Ear: External ear normal.      Ears:      Comments: Right t tube dry and patent   Left ear with pack in place      Nose: Congestion present.      Mouth/Throat:      Lips: Pink.      Mouth: Mucous membranes are moist.   Neurological:      Mental Status: She is alert.     Nasal endoscopy    Date/Time: 12/13/2022 8:47 AM  Performed by: Dilia Rogers APRN  Authorized by: Dilia Rogers APRN     Procedure details:     Indications: post-operative debridement      Medication:  None    Instrument: rigid nasal endoscopy      Scope location: bilateral nare    Nasal cavity:     Right inferior turbinates: crusting and post-surgical changes      Left inferior turbinates: crusting and post-surgical changes    Septum:     Findings: normal, left septal crusting and right septal crusting    Post-procedure details:     Patient tolerance of procedure:  Tolerated well  Comments:      Considerable amount of crusting and mucoid drainage cleared with suction debridement without difficulty           Assessment & Plan   Diagnoses and all orders for this visit:    1. S/P sinus surgery (Primary)    2. H/O myringoplasty    3. Conductive hearing loss, bilateral    4. Dysfunction of both eustachian tubes    5. S/P myringotomy with insertion of tube    Other orders  -     $ Nasal / Sinus Endoscopy      * Surgery not found *  Orders Placed This Encounter   Procedures    • $ Nasal / Sinus Endoscopy     This order was created via procedure documentation     Order Specific Question:   Release to patient     Answer:   Routine Release     Return in about 4 weeks (around 1/9/2023).       Patient Instructions   Continue mupirocin x 2 weeks  Continue nasal sprays

## 2023-01-27 ENCOUNTER — OFFICE VISIT (OUTPATIENT)
Dept: OTOLARYNGOLOGY | Facility: CLINIC | Age: 38
End: 2023-01-27
Payer: COMMERCIAL

## 2023-01-27 VITALS
DIASTOLIC BLOOD PRESSURE: 97 MMHG | SYSTOLIC BLOOD PRESSURE: 169 MMHG | HEIGHT: 61 IN | TEMPERATURE: 99.7 F | WEIGHT: 177 LBS | HEART RATE: 78 BPM | BODY MASS INDEX: 33.42 KG/M2

## 2023-01-27 DIAGNOSIS — Z98.890 S/P SINUS SURGERY: Primary | ICD-10-CM

## 2023-01-27 DIAGNOSIS — Z96.22 S/P MYRINGOTOMY WITH INSERTION OF TUBE: ICD-10-CM

## 2023-01-27 DIAGNOSIS — Z98.890 H/O MYRINGOPLASTY: ICD-10-CM

## 2023-01-27 PROCEDURE — 99024 POSTOP FOLLOW-UP VISIT: CPT | Performed by: NURSE PRACTITIONER

## 2023-01-27 NOTE — PROGRESS NOTES
YOB: 1985  Location: [facility]  Location Address: [address] [place] [address], [facility], [address] [place] [address]  Location Phone: [phone]    Chief Complaint   Patient presents with   • Sinus Problem       History of Present Illness  Shante Bowie is a 37 y.o. female.  Shante Bowie is here for follow up of ENT complaints. The patient is s/p septoplasty, inferior turbinate reduction, left qian bullosa resection adenoidectomy and left myringoplasty 10/31/2022  She has a history of right t tue placement   Patient states she recently went on a cruise and had some thick crusted debris from her nose. She feels as if the left side has more congestion than the right.   She denies otalgia or otorrhea   Patient is using nasal sprays as directed         Past Medical History:   Diagnosis Date   • Asthma     At least 5-10 years   • Depression    • Diabetic acidosis, type II (HCC)    • Ear infection    • Hard to intubate    • Hearing loss     frequent ear infections   • High blood pressure    • History of transfusion        • HL (hearing loss)    • Migraines    • Polycystic ovary syndrome    • Rosacea    • Sleep apnea     wears c pap       Past Surgical History:   Procedure Laterality Date   • ADENOIDECTOMY     • BREAST SURGERY Bilateral     augmentation on left, reduction on right   • D & C AND LAPAROSCOPY     • DILATATION AND CURETTAGE      2022   • ENDOSCOPIC FUNCTIONAL SINUS SURGERY (FESS) N/A 10/31/2022    Procedure: IMAGE GUIDED SEPTOPLASTY, RESECTION INFERIOR TURBINATES, LEFT QIAN BULLOSA RESECTION WITH LEFT ANTROSTOMY, WITH ADENOIDECTOMY, MYRINGOPLASTY WITH LEFT T TUBE PLACEMENT;  Surgeon: Lazarus Clark MD;  Location: [facility] OR;  Service: ENT;  Laterality: N/A;   • FEMUR FRACTURE SURGERY Left    • FOOT TENDON SURGERY Left    • KNEE ACL RECONSTRUCTION Left    • MYRINGOPLASTY Left 10/31/2022    Procedure: MYRINGOPLASTY WITH LEFT T TUBE PLACEMENT;  Surgeon: Lazarus Clark MD;  Location: [facility]  OR;  Service: ENT;  Laterality: Left;   • MYRINGOTOMY W/ TUBES      pt states has had 22 sets if tubes   • MYRINGOTOMY W/ TUBES Bilateral 03/19/2021    Procedure: BILATERAL MYRINGOTOMY WITH INSERTION OF EAR T-TUBES;  Surgeon: Lazarus Clark MD;  Location: [facility] OR;  Service: ENT;  Laterality: Bilateral;   • TONSILLECTOMY         Outpatient Medications Marked as Taking for the 1/27/23 encounter (Office Visit) with Dilia Rogers APRN   Medication Sig Dispense Refill   • ARIPiprazole (ABILIFY) 2 MG tablet      • azelastine (ASTELIN) 0.1 % nasal spray 2 sprays into the nostril(s) as directed by provider 2 (Two) Times a Day. Use in each nostril as directed 30 mL 11   • buPROPion SR (WELLBUTRIN SR) 150 MG 12 hr tablet bupropion HCl  mg tablet,12 hr sustained-release   Take 1 tablet every day by oral route in the morning.     • busPIRone (BUSPAR) 5 MG tablet Take 2 tablets by mouth 4 (Four) Times a Day As Needed.     • ciprofloxacin (CILOXAN) 0.3 % ophthalmic solution      • fluticasone (FLONASE) 50 MCG/ACT nasal spray 2 sprays into the nostril(s) as directed by provider Daily. 16 g 11   • glimepiride (AMARYL) 2 MG tablet Take 2 mg by mouth 2 (Two) Times a Day.     • labetalol (NORMODYNE) 100 MG tablet Take 200 mg by mouth 2 (Two) Times a Day.     • levocetirizine (XYZAL) 5 MG tablet Take 5 mg by mouth Every Evening.     • loratadine (CLARITIN) 10 MG tablet Take 10 mg by mouth Daily.     • NON FORMULARY Take 80 mcg by mouth Daily. 4 capsules daily D-Delta-Inositol     • Prenatal Vit-Fe Fumarate-FA (PRENATAL VITAMINS PO) Take 1 tablet by mouth Daily.     • triamterene-hydrochlorothiazide (DYAZIDE) 37.5-25 MG per capsule Take 1 capsule by mouth Every Morning.         Gold-containing drug products    Family History   Problem Relation Age of Onset   • Asthma Mother    • Rashes / Skin problems Mother    • Hypertension Father    • Deep vein thrombosis Father        Social History     Socioeconomic History   •  Marital status:    Tobacco Use   • Smoking status: Never   • Smokeless tobacco: Never   Vaping Use   • Vaping Use: Never used   Substance and Sexual Activity   • Alcohol use: Not Currently     Comment: occ   • Drug use: Not Currently   • Sexual activity: Defer       Review of Systems   Constitutional: Negative.    HENT: Positive for congestion. Negative for ear discharge and ear pain.        Vitals:    01/27/23 0926   BP: 169/97   Pulse: 78   Temp: 99.7 °F (37.6 °C)       Body mass index is 33.44 kg/m².    Objective       Physical Exam  Vitals reviewed.   Constitutional:       Appearance: Normal appearance. She is obese.   HENT:      Head: Normocephalic.      Ears:      Comments: Right t tube with minimal crusted cerumen   Left with previous graft site possible small residual perforation        Nose: Congestion present.   Neurological:      Mental Status: She is alert.       Nasal endoscopy    Date/Time: 1/30/2023 4:06 PM  Performed by: Dilia Rogers APRN  Authorized by: Dilia Rogers APRN     Procedure details:     Indications: post-operative debridement      Medication:  None    Instrument: rigid nasal endoscopy      Scope location: bilateral nare    Post-procedure details:     Patient tolerance of procedure:  Tolerated well  Comments:      Bilateral nares with significant crusting left > right cleared with suction debridement             Assessment & Plan   Diagnoses and all orders for this visit:    1. S/P sinus surgery (Primary)    2. H/O myringoplasty    3. S/P myringotomy with insertion of tube    Other orders  -     $ Nasal / Sinus Endoscopy      * Surgery not found *  Orders Placed This Encounter   Procedures   • $ Nasal / Sinus Endoscopy     This order was created via procedure documentation     Order Specific Question:   Release to patient     Answer:   Routine Release     Return in about 2 weeks (around 2/10/2023).     Use mupirocin bid for the next 2 weeks   Can use cool mist humidifier in  bedroom at night   Can continue nasal sprays   Return for recheck     There are no Patient Instructions on file for this visit.

## 2023-01-30 PROCEDURE — 31237 NSL/SINS NDSC SURG BX POLYPC: CPT | Performed by: NURSE PRACTITIONER

## 2023-02-20 NOTE — PROGRESS NOTES
"YOB: 1985  Location: [facility]  Location Address: [address] [place] [address], [facility], [address] [place] [address]  Location Phone: [phone]    Chief Complaint   Patient presents with   • Sinus Problem     Congestion and \"chunks\" on left side going down throat    • Ear Problem     Ear pain and achy left ear only        History of Present Illness  Shante Bowie is a 37 y.o. female.  Shante Bowie is here for follow up of ENT complaints. The patient has had problems s/p septoplasty, inferior turbinate reduction, left qian bullosa resection adenoidectomy and left myringoplasty 10/31/2022  She has a history of right t-tube placement   Patient states she recently went on a cruise and had some thick crusted debris from her nose.   Is having some fullness in the left ear.  She denies bloody nasal drainage and her crusting is better but is still present.     Past Medical History:   Diagnosis Date   • Asthma     At least 5-10 years   • Depression    • Diabetic acidosis, type II (HCC)    • Ear infection    • Hard to intubate    • Hearing loss     frequent ear infections   • High blood pressure    • History of transfusion        • HL (hearing loss)    • Migraines    • Polycystic ovary syndrome    • Rosacea    • Sleep apnea     wears c pap       Past Surgical History:   Procedure Laterality Date   • ADENOIDECTOMY     • BREAST SURGERY Bilateral 2002    augmentation on left, reduction on right   • D & C AND LAPAROSCOPY     • DILATATION AND CURETTAGE      2022   • ENDOSCOPIC FUNCTIONAL SINUS SURGERY (FESS) N/A 10/31/2022    Procedure: IMAGE GUIDED SEPTOPLASTY, RESECTION INFERIOR TURBINATES, LEFT QIAN BULLOSA RESECTION WITH LEFT ANTROSTOMY, WITH ADENOIDECTOMY, MYRINGOPLASTY WITH LEFT T TUBE PLACEMENT;  Surgeon: Lazarus Clark MD;  Location: [facility];  Service: ENT;  Laterality: N/A;   • FEMUR FRACTURE SURGERY Left    • FOOT TENDON SURGERY Left    • KNEE ACL RECONSTRUCTION Left    • MYRINGOPLASTY Left 10/31/2022 "    Procedure: MYRINGOPLASTY WITH LEFT T TUBE PLACEMENT;  Surgeon: Lazarus Clark MD;  Location:  [facility] OR;  Service: ENT;  Laterality: Left;   • MYRINGOTOMY W/ TUBES      pt states has had 22 sets if tubes   • MYRINGOTOMY W/ TUBES Bilateral 03/19/2021    Procedure: BILATERAL MYRINGOTOMY WITH INSERTION OF EAR T-TUBES;  Surgeon: Lazarus Clark MD;  Location:  [facility] OR;  Service: ENT;  Laterality: Bilateral;   • TONSILLECTOMY         Outpatient Medications Marked as Taking for the 2/21/23 encounter (Office Visit) with Lazarus Clark MD   Medication Sig Dispense Refill   • ARIPiprazole (ABILIFY) 2 MG tablet      • azelastine (ASTELIN) 0.1 % nasal spray 2 sprays into the nostril(s) as directed by provider 2 (Two) Times a Day. Use in each nostril as directed 30 mL 11   • buPROPion SR (WELLBUTRIN SR) 150 MG 12 hr tablet bupropion HCl  mg tablet,12 hr sustained-release   Take 1 tablet every day by oral route in the morning.     • busPIRone (BUSPAR) 10 MG tablet Take 1 tablet by mouth Every 6 (Six) Hours.     • ciprofloxacin (CILOXAN) 0.3 % ophthalmic solution      • Clomid 50 MG tablet Take 2 tablets by mouth Daily.     • fluticasone (FLONASE) 50 MCG/ACT nasal spray 2 sprays into the nostril(s) as directed by provider Daily. 16 g 11   • glimepiride (AMARYL) 2 MG tablet Take 2 mg by mouth 2 (Two) Times a Day.     • labetalol (NORMODYNE) 100 MG tablet Take 200 mg by mouth 2 (Two) Times a Day.     • levocetirizine (XYZAL) 5 MG tablet Take 5 mg by mouth Every Evening.     • loratadine (CLARITIN) 10 MG tablet Take 10 mg by mouth Daily.     • medroxyPROGESTERone (PROVERA) 10 MG tablet Take 1 tablet by mouth Daily.     • NON FORMULARY Take 80 mcg by mouth Daily. 4 capsules daily D-Delta-Inositol     • Prenatal Vit-Fe Fumarate-FA (PRENATAL VITAMINS PO) Take 1 tablet by mouth Daily.     • triamterene-hydrochlorothiazide (DYAZIDE) 37.5-25 MG per capsule Take 1 capsule by mouth Every Morning.     • [DISCONTINUED]  busPIRone (BUSPAR) 5 MG tablet Take 2 tablets by mouth 4 (Four) Times a Day As Needed.         Gold-containing drug products    Family History   Problem Relation Age of Onset   • Asthma Mother    • Rashes / Skin problems Mother    • Hypertension Father    • Deep vein thrombosis Father        Social History     Socioeconomic History   • Marital status:    Tobacco Use   • Smoking status: Never   • Smokeless tobacco: Never   Vaping Use   • Vaping Use: Never used   Substance and Sexual Activity   • Alcohol use: Not Currently     Comment: occ   • Drug use: Not Currently   • Sexual activity: Defer       Review of Systems  Negative except for HPI  Vitals:    02/21/23 1546   BP: 162/97   Pulse: 93   Temp: 98.7 °F (37.1 °C)       Body mass index is 34.76 kg/m².    Objective     Physical Exam  CONSTITUTIONAL: well nourished, well-developed, alert, oriented, in no acute distress     COMMUNICATION AND VOICE: able to communicate normally, normal voice quality    HEAD: normocephalic, no lesions, atraumatic, no tenderness, no masses     FACE: appearance normal, no lesions, no tenderness, no deformities, facial motion symmetric    EYES: ocular motility normal, eyelids normal, orbits normal, no proptosis, conjunctiva normal , pupils equal, round     EARS:  Hearing: hearing to conversational voice intact bilaterally   External Ears: normal bilaterally, no lesions  AS-mild retraction with intact TM and mild tympanosclerosis  AD-TM with patent T-tube in place    NOSE:  External Nose: external nasal structure normal, no tenderness on palpation, no nasal discharge, no lesions, no evidence of trauma, nostrils patent   Intranasal exam: See endoscopy    ORAL:  Lips: upper and lower lips without lesion     NECK:  Inspection and Palpation: neck appearance normal, no masses or tenderness    CHEST/RESPIRATORY: normal respiratory effort     CARDIOVASCULAR: no cyanosis or edema     NEUROLOGICAL/PSYCHIATRIC: oriented to time, place and  person, mood normal, affect appropriate, CN II-XII intact grossly    Assessment & Plan   Diagnoses and all orders for this visit:    1. S/P FESS (functional endoscopic sinus surgery) (Primary)    2. S/P myringotomy with insertion of tube    3. Dysfunction of both eustachian tubes    4. Conductive hearing loss, bilateral      * Surgery not found *  No orders of the defined types were placed in this encounter.    Return in about 6 weeks (around 4/4/2023).       Patient Instructions   Continue mupirocin  Dry ear precautions  Continue saline irrigation  Follow-up in 6 to 8 weeks

## 2023-02-21 ENCOUNTER — OFFICE VISIT (OUTPATIENT)
Dept: OTOLARYNGOLOGY | Facility: CLINIC | Age: 38
End: 2023-02-21
Payer: COMMERCIAL

## 2023-02-21 VITALS
TEMPERATURE: 98.7 F | DIASTOLIC BLOOD PRESSURE: 97 MMHG | SYSTOLIC BLOOD PRESSURE: 162 MMHG | HEART RATE: 93 BPM | WEIGHT: 178 LBS | HEIGHT: 60 IN | BODY MASS INDEX: 34.95 KG/M2

## 2023-02-21 DIAGNOSIS — Z96.22 S/P MYRINGOTOMY WITH INSERTION OF TUBE: ICD-10-CM

## 2023-02-21 DIAGNOSIS — H69.83 DYSFUNCTION OF BOTH EUSTACHIAN TUBES: ICD-10-CM

## 2023-02-21 DIAGNOSIS — H90.0 CONDUCTIVE HEARING LOSS, BILATERAL: ICD-10-CM

## 2023-02-21 DIAGNOSIS — Z98.890 S/P FESS (FUNCTIONAL ENDOSCOPIC SINUS SURGERY): Primary | ICD-10-CM

## 2023-02-21 PROCEDURE — 99213 OFFICE O/P EST LOW 20 MIN: CPT | Performed by: OTOLARYNGOLOGY

## 2023-02-21 PROCEDURE — 31237 NSL/SINS NDSC SURG BX POLYPC: CPT | Performed by: OTOLARYNGOLOGY

## 2023-02-21 RX ORDER — MEDROXYPROGESTERONE ACETATE 10 MG/1
1 TABLET ORAL DAILY
COMMUNITY
Start: 2023-02-13

## 2023-02-21 RX ORDER — CLOMIPHENE CITRATE 50 MG/1
2 TABLET ORAL DAILY
COMMUNITY
Start: 2023-02-06

## 2023-02-21 RX ORDER — BUSPIRONE HYDROCHLORIDE 10 MG/1
1 TABLET ORAL EVERY 6 HOURS
COMMUNITY
Start: 2023-01-03

## 2023-02-21 NOTE — PROGRESS NOTES
Procedure Note    Shante Bowie    DATE OF PROCEDURE: 2/21/2023    PROCEDURE:   Bilateral Rigid Nasal Endoscopy with debridement     PREPROCEDURE DIAGNOSIS:   Chronic rhinosinusitis S/P septoplasty, inferior turbinate reduction, left qian bullosa resection adenoidectomy and left myringoplasty 10/31/2022     POSTPROCEDURE DIAGNOSIS:  SAME     ANESTHESIA:   None       Procedure Details:    After topical anesthesia and decongestion, the patient was placed in the sitting position.  An endoscope was passed along the left nasal floor to the nasopharynx.  It was then passed into the region of the middle meatus, middle turbinate, and the sphenoethmoid region. An identical procedure was performed on the right side.  The following findings were noted as stated below:    Findings: Moderate crusting over the qian bullosa resection at the ground lamella on the left which was removed with suction debridement and bayonet forceps.  Antrostomies patent and no other obstruction or polyposis noted.  The right nasal cavity was essentially normal.    Condition:  Stable.  Patient tolerated procedure well.    Complications:  None

## 2023-06-01 ENCOUNTER — OFFICE VISIT (OUTPATIENT)
Dept: OTOLARYNGOLOGY | Facility: CLINIC | Age: 38
End: 2023-06-01

## 2023-06-01 VITALS
RESPIRATION RATE: 16 BRPM | WEIGHT: 163 LBS | HEART RATE: 85 BPM | TEMPERATURE: 98.2 F | SYSTOLIC BLOOD PRESSURE: 155 MMHG | BODY MASS INDEX: 32 KG/M2 | HEIGHT: 60 IN | DIASTOLIC BLOOD PRESSURE: 82 MMHG

## 2023-06-01 DIAGNOSIS — H66.006 RECURRENT ACUTE SUPPURATIVE OTITIS MEDIA WITHOUT SPONTANEOUS RUPTURE OF TYMPANIC MEMBRANE OF BOTH SIDES: ICD-10-CM

## 2023-06-01 DIAGNOSIS — Z98.890 S/P SINUS SURGERY: ICD-10-CM

## 2023-06-01 DIAGNOSIS — Z98.890 S/P FESS (FUNCTIONAL ENDOSCOPIC SINUS SURGERY): Primary | ICD-10-CM

## 2023-06-01 DIAGNOSIS — H69.83 DYSFUNCTION OF BOTH EUSTACHIAN TUBES: ICD-10-CM

## 2023-06-01 DIAGNOSIS — Z96.22 S/P MYRINGOTOMY WITH INSERTION OF TUBE: ICD-10-CM

## 2023-06-01 RX ORDER — ORAL SEMAGLUTIDE 7 MG/1
TABLET ORAL
COMMUNITY
Start: 2023-04-17

## 2023-06-01 RX ORDER — EMPAGLIFLOZIN 25 MG/1
TABLET, FILM COATED ORAL
COMMUNITY
Start: 2023-04-17

## 2023-06-01 NOTE — PROGRESS NOTES
PROCEDURE NOTE    Shante Bowie    DATE OF PROCEDURE: 6/1/2023    PROCEDURE:   Bilateral Diagnostic Rigid Nasal Endoscopy    PREPROCEDURE DIAGNOSIS:   Status post septoplasty, inferior turbinate reduction and left qian bullosa resection    POSTPROCEDURE DIAGNOSIS:  SAME    ANESTHESIA:   None    PROCEDURE DESCRIPTION:    With the patient in the chair bilateral diagnostic rigid nasal endoscopy was performed with the StorO4IT 0° endoscope without difficulty.     An endoscope was passed along the left nasal floor to the nasopharynx.  It was then passed into the region of the middle meatus, middle turbinate, and the sphenoethmoid region. An identical procedure was performed on the right side.  The following findings were noted as stated below:    Findings: Moderate crusting on the left removed with bayonet forceps near the ground lamella. The right nasal cavity was normal in appearance.The antrostomy on the left was patent..No recidivistic polyposis was noted bilaterally    Condition:  Stable.      Complications:  None    There was no significant bleeding. The patient tolerated the procedure well.            [[

## 2023-06-01 NOTE — PATIENT INSTRUCTIONS
Continue nasal sprays  Dry ear precautions  May continue saline irrigation  Return for problems    CONTACT INFORMATION:  The main office phone number is [phone]. For emergencies after hours and on weekends, this number will convert over to our answering service and the on call provider will answer. Please try to keep non emergent phone calls/ questions to office hours 9am-5pm Monday through Friday.      [employer]  As an alternative, you can sign up and use the Epic MyChart system for more direct and quicker access for non emergent questions/ problems.  [employer] allows you to send messages to your doctor, view your test results, renew your prescriptions, schedule appointments, and more. To sign up, go to [employer] and click on the Sign Up Now link in the New User? box. Enter your [employer] Activation Code exactly as it appears below along with the last four digits of your Social Security Number and your Date of Birth () to complete the sign-up process. If you do not sign up before the expiration date, you must request a new code.     [employer] Activation Code: Activation code not generated  Current [employer] Status: Active     If you have questions, you can email [email or web address] or call [phone] to talk to our [employer] staff. Remember, [employer] is NOT to be used for urgent needs. For medical emergencies, dial 911.     IF YOU SMOKE OR USE TOBACCO PLEASE READ THE FOLLOWING:  Why is smoking bad for me?  Smoking increases the risk of heart disease, lung disease, vascular disease, stroke, and cancer. If you smoke, STOP!        IF YOU SMOKE OR USE TOBACCO PLEASE READ THE FOLLOWING:  Why is smoking bad for me?  Smoking increases the risk of heart disease, lung disease, vascular disease, stroke, and cancer. If you smoke, STOP!     For more information:  Quit Now [facility]ian  -QUIT-NOW  [email or web address]

## 2023-07-11 NOTE — PROGRESS NOTES
YOB: 1985  Location: [facility]  Location Address: [address] [place] [address], [facility], [address] [place] [address]  Location Phone: [phone]    Chief Complaint   Patient presents with    sinus surgery       History of Present Illness  Shante Bowie is a 38 y.o. female.  Shante Bowie is here for follow up of ENT complaints. The patient is s/p septoplasty, inferior turbinate reduction, left qian bullosa resection adenoidectomy and left myringoplasty 10/31/2022.  She has a history of right t-tube placement.    She has intermittent nasal congestion and crusting left > right. She has been using flonase and astelin as well as a nasal rinse.     Past Medical History:   Diagnosis Date    Asthma     At least 5-10 years    Depression     Diabetic acidosis, type II     Ear infection     Hard to intubate     Hearing loss     frequent ear infections    High blood pressure     History of transfusion         HL (hearing loss)     Migraines     Polycystic ovary syndrome     Rosacea     Sleep apnea     wears c pap       Past Surgical History:   Procedure Laterality Date    ADENOIDECTOMY      BREAST SURGERY Bilateral     augmentation on left, reduction on right    COSMETIC SURGERY      D & C AND LAPAROSCOPY      DILATATION AND CURETTAGE      2022    ENDOSCOPIC FUNCTIONAL SINUS SURGERY (FESS) N/A 10/31/2022    Procedure: IMAGE GUIDED SEPTOPLASTY, RESECTION INFERIOR TURBINATES, LEFT QIAN BULLOSA RESECTION WITH LEFT ANTROSTOMY, WITH ADENOIDECTOMY, MYRINGOPLASTY WITH LEFT T TUBE PLACEMENT;  Surgeon: Lazarus Clark MD;  Location: [facility];  Service: ENT;  Laterality: N/A;    FEMUR FRACTURE SURGERY Left     FOOT TENDON SURGERY Left     KNEE ACL RECONSTRUCTION Left     MYRINGOPLASTY Left 10/31/2022    Procedure: MYRINGOPLASTY WITH LEFT T TUBE PLACEMENT;  Surgeon: Lazarus Clark MD;  Location: [facility];  Service: ENT;  Laterality: Left;    MYRINGOTOMY W/ TUBES      pt states has had 22 sets if tubes     MYRINGOTOMY W/ TUBES Bilateral 03/19/2021    Procedure: BILATERAL MYRINGOTOMY WITH INSERTION OF EAR T-TUBES;  Surgeon: Lazarus Clark MD;  Location: [facility];  Service: ENT;  Laterality: Bilateral;    SINUS SURGERY  2022    TONSILLECTOMY         Outpatient Medications Marked as Taking for the 6/1/23 encounter (Office Visit) with Lazarus Clark MD   Medication Sig Dispense Refill    azelastine (ASTELIN) 0.1 % nasal spray 2 sprays into the nostril(s) as directed by provider 2 (Two) Times a Day. Use in each nostril as directed 30 mL 11    buPROPion SR (WELLBUTRIN SR) 150 MG 12 hr tablet bupropion HCl  mg tablet,12 hr sustained-release   Take 1 tablet every day by oral route in the morning.      busPIRone (BUSPAR) 10 MG tablet Take 1 tablet by mouth Every 6 (Six) Hours.      fluticasone (FLONASE) 50 MCG/ACT nasal spray 2 sprays into the nostril(s) as directed by provider Daily. 16 g 11    Jardiance 25 MG tablet tablet       labetalol (NORMODYNE) 100 MG tablet Take 2 tablets by mouth 2 (Two) Times a Day.      levocetirizine (XYZAL) 5 MG tablet Take 1 tablet by mouth Every Evening.      loratadine (CLARITIN) 10 MG tablet Take 1 tablet by mouth Daily.      Prenatal Vit-Fe Fumarate-FA (PRENATAL VITAMINS PO) Take 1 tablet by mouth Daily.      Rybelsus 7 MG tablet       triamterene-hydrochlorothiazide (DYAZIDE) 37.5-25 MG per capsule Take 1 capsule by mouth Every Morning.         Gold-containing drug products    Family History   Problem Relation Age of Onset    Asthma Mother     Rashes / Skin problems Mother     Hypertension Father     Deep vein thrombosis Father     Diabetes Maternal Grandmother        Social History     Socioeconomic History    Marital status:    Tobacco Use    Smoking status: Never    Smokeless tobacco: Never   Vaping Use    Vaping Use: Never used   Substance and Sexual Activity    Alcohol use: Not Currently     Comment: occ    Drug use: Never    Sexual activity: Defer        Review of Systems   Constitutional: Negative.    HENT:  Positive for congestion.    Respiratory: Negative.     Cardiovascular: Negative.    Gastrointestinal: Negative.    Genitourinary: Negative.      Vitals:    06/01/23 0917   BP: 155/82   Pulse: 85   Resp: 16   Temp: 98.2 °F (36.8 °C)       Body mass index is 31.83 kg/m².    Objective     Physical Exam  Vitals reviewed.   Constitutional:       Appearance: Normal appearance. She is obese.   HENT:      Head: Normocephalic and atraumatic.      Right Ear: External ear normal. A PE tube is present.      Left Ear: External ear normal.      Ears:      Comments: Right t tube with minimal crusted cerumen   Left with previous graft site       Nose: Nose normal.      Mouth/Throat:      Lips: Pink.      Mouth: Mucous membranes are moist.   Musculoskeletal:      Cervical back: Full passive range of motion without pain.   Neurological:      Mental Status: She is alert.   Psychiatric:         Behavior: Behavior is cooperative.       Assessment & Plan   Problems Addressed this Visit          ENT    Recurrent acute suppurative otitis media without spontaneous rupture of tympanic membrane of both sides    Relevant Orders    Comprehensive Hearing Test    Dysfunction of both eustachian tubes    Relevant Orders    Comprehensive Hearing Test    S/P myringotomy with insertion of tube    S/P FESS (functional endoscopic sinus surgery) - Primary     Other Visit Diagnoses       S/P sinus surgery              Diagnoses         Codes Comments    S/P FESS (functional endoscopic sinus surgery)    -  Primary ICD-10-CM: Z98.890  ICD-9-CM: V45.89     S/P myringotomy with insertion of tube     ICD-10-CM: Z96.22  ICD-9-CM: V45.89     Dysfunction of both eustachian tubes     ICD-10-CM: H69.83  ICD-9-CM: 381.81     S/P sinus surgery     ICD-10-CM: Z98.890  ICD-9-CM: V45.89     Recurrent acute suppurative otitis media without spontaneous rupture of tympanic membrane of both sides     ICD-10-CM:  H66.006  ICD-9-CM: 382.00           * Surgery not found *  Orders Placed This Encounter   Procedures    Comprehensive Hearing Test     Standing Status:   Future     Standing Expiration Date:   2024     Order Specific Question:   Laterality     Answer:   Bilateral     Continue nasal sprays  Dry ear precautions  May continue saline irrigation  Return for problems    Dr. Clark examined and discussed care with patient and agrees with treatment plan.     Return in about 6 months (around 2023) for Recheck, with audio.       Patient Instructions   Continue nasal sprays  Dry ear precautions  May continue saline irrigation  Return for problems    CONTACT INFORMATION:  The main office phone number is [phone]. For emergencies after hours and on weekends, this number will convert over to our answering service and the on call provider will answer. Please try to keep non emergent phone calls/ questions to office hours 9am-5pm Monday through Friday.      i-nexus  As an alternative, you can sign up and use the Epic MyChart system for more direct and quicker access for non emergent questions/ problems.  [email or web address] allows you to send messages to your doctor, view your test results, renew your prescriptions, schedule appointments, and more. To sign up, go to [employer] and click on the Sign Up Now link in the New User? box. Enter your i-nexus Activation Code exactly as it appears below along with the last four digits of your Social Security Number and your Date of Birth () to complete the sign-up process. If you do not sign up before the expiration date, you must request a new code.     i-nexus Activation Code: Activation code not generated  Current i-nexus Status: Active     If you have questions, you can email [email or web address] or call [phone] to talk to our i-nexus staff. Remember, i-nexus is NOT to be used for urgent needs. For medical emergencies, dial 911.     IF YOU  SMOKE OR USE TOBACCO PLEASE READ THE FOLLOWING:  Why is smoking bad for me?  Smoking increases the risk of heart disease, lung disease, vascular disease, stroke, and cancer. If you smoke, STOP!        IF YOU SMOKE OR USE TOBACCO PLEASE READ THE FOLLOWING:  Why is smoking bad for me?  Smoking increases the risk of heart disease, lung disease, vascular disease, stroke, and cancer. If you smoke, STOP!     For more information:  Quit Now [place]  1-800-QUIT-NOW  https://[place].quitlogix.org/en-[place]/    Nostril Rim Text: The closure involved the nostril rim.

## 2023-07-12 ENCOUNTER — OFFICE VISIT (OUTPATIENT)
Dept: NEUROLOGY | Age: 38
End: 2023-07-12
Payer: COMMERCIAL

## 2023-07-12 VITALS
RESPIRATION RATE: 18 BRPM | BODY MASS INDEX: 31.61 KG/M2 | HEIGHT: 60 IN | HEART RATE: 89 BPM | SYSTOLIC BLOOD PRESSURE: 117 MMHG | DIASTOLIC BLOOD PRESSURE: 84 MMHG | WEIGHT: 161 LBS

## 2023-07-12 DIAGNOSIS — G47.33 OBSTRUCTIVE SLEEP APNEA: Primary | ICD-10-CM

## 2023-07-12 DIAGNOSIS — Z99.89 CPAP (CONTINUOUS POSITIVE AIRWAY PRESSURE) DEPENDENCE: ICD-10-CM

## 2023-07-12 PROCEDURE — 99213 OFFICE O/P EST LOW 20 MIN: CPT | Performed by: PHYSICIAN ASSISTANT

## 2023-07-12 RX ORDER — TRIAMTERENE AND HYDROCHLOROTHIAZIDE 37.5; 25 MG/1; MG/1
CAPSULE ORAL
COMMUNITY
Start: 2023-05-09

## 2023-07-12 RX ORDER — EMPAGLIFLOZIN 25 MG/1
25 TABLET, FILM COATED ORAL DAILY
COMMUNITY
Start: 2023-04-20

## 2023-07-12 RX ORDER — ORAL SEMAGLUTIDE 7 MG/1
TABLET ORAL
COMMUNITY
Start: 2023-05-31

## 2023-07-12 NOTE — PATIENT INSTRUCTIONS
Patient education: Sleep apnea in adults       INTRODUCTION -- Normally during sleep, air moves through the throat and in and out of the lungs at a regular rhythm. In a person with sleep apnea, air movement is periodically diminished or stopped. There are two types of sleep apnea: obstructive sleep apnea and central sleep apnea. In obstructive sleep apnea, breathing is abnormal because of narrowing or closure of the throat. In central sleep apnea, breathing is abnormal because of a change in the breathing control and rhythm. Sleep apnea is a serious condition that can affect a person's ability to safely perform normal daily activities and can affect long term health. Approximately 25 percent of adults are at risk for sleep apnea of some degree. Men are more commonly affected than women. Other risk factors include middle and older age, being overweight or obese, and having a small mouth and throat. This topic review focuses on the most common type of sleep apnea in adults, obstructive sleep apnea (RUBY). HOW SLEEP APNEA OCCURS -- The throat is surrounded by muscles that control the airway for speaking, swallowing, and breathing. During sleep, these muscles are less active, and this causes the throat to narrow. In most people, this narrowing does not affect breathing. In others, it can cause snoring, sometimes with reduced or completely blocked airflow. A completely blocked airway without airflow is called an obstructive apnea. Partial obstruction with diminished airflow is called a hypopnea. A person may have apnea and hypopnea during sleep. Insufficient breathing due to apnea or hypopnea causes oxygen levels to fall and carbon dioxide to rise. Because the airway is blocked, breathing faster or harder does not help to improve oxygen levels until the airway is reopened. Typically, the obstruction requires the person to awaken to activate the upper airway muscles.  Once the airway is opened, the person then

## 2023-07-12 NOTE — PROGRESS NOTES
REVIEW OF SYSTEMS    Constitutional: []Fever []Sweats []Chills [] Recent Injury   [x] Denies all unless marked  HENT:[]Headache  [] Head Injury  [] Sore Throat  [] Ear Pain  [] Dizziness [] Hearing Loss   [x] Denies all unless marked  Musculoskeletal: [] Arthralgia  [] Myalgias [] Muscle cramps  [] Muscle twitches   [x] Denies all unless marked   Spine:  [] Neck pain  [] Back pain  [] Sciatica  [x] Denies all unless marked  Neurological:[] Visual Disturbance [] Double Vision [] Slurred Speech [] Trouble swallowing  [] Vertigo [] Tingling [] Numbness [] Weakness [] Loss of Balance   [] Loss of Consciousness [] Memory Loss [] Seizures  [x] Denies all unless marked  Psychiatric/Behavioral:[] Depression [] Anxiety  [x] Denies all unless marked  Sleep: []  Insomnia [] Sleep Disturbance [] Snoring [] Restless Legs [] Daytime Sleepiness [x] Sleep Apnea  [] Denies all unless marked
[facility]  [address] [facility], [address] [place] [address] Adan Rhodes [address]  [place], [address] [place] [address]  Phone [phone]  Fax [phone]       [facility] Sleep Follow Up Encounter      Information:   Patient Name: Mallika Meyers  :   1985  Age:   45 y.o. MRN:   [id number]  Account #:  [de-identified]  Today:                23    Provider:  Mary Guzman PA-C    Chief Complaint   Patient presents with    Follow-up    Sleep Apnea        Subjective:   Rhina Silva is a 45 y.o. female  with a history of severe RUBY who comes in for an annual sleep clinic follow up. She was diagnosed with RUBY several years ago and used CPAP. Her device broke and she had an HST. The HST, 2019 revealed an AHI of 89.8. She is prescribed auto CPAP therapy currently with a pressure range of 8cm to 12cm. Today she reports that she has become intolerant to the present mask because she has lost 20 lbs and the mask doesn't fit properly. She has been unable to obtain supplies because she was required to come in for the FTF visit, as required by insurance. She had a deviated septum repair and a nasal polypectomy 10/2022. She plans to restart CPAP when she obtains the new supplies. She does feel improved in regards to RUBY symptoms with consistent use.         Location or symptom:  RUBY  Onset:  Initial dx: years ago; most recent HST, 2019   Timing:  q hs  Severity:  Severe  Associated:  Snoring, witnessed apneas, and excessive daytime somnolence  Alleviated:  CPAP       Objective:     Past Medical History:   Diagnosis Date    CPAP (continuous positive airway pressure) dependence     Diabetes mellitus ([address])     Obstructive sleep apnea     AHI: 89.8 per HST, 2019    PCOS (polycystic ovarian syndrome)     Sleep difficulties        Past Surgical History:   Procedure Laterality Date    ANKLE SURGERY      left ankle tendon repair    ANTERIOR CRUCIATE LIGAMENT REPAIR      left    BREAST REDUCTION SURGERY      right and submuscular implant on the left due to
No risk alerts present

## 2023-12-15 NOTE — PROGRESS NOTES
FOLLOW-UP AUDIOMETRIC EVALUATION      Name:  Shante Bowie  :  1985  Age:  38 y.o.  Date of Evaluation:  2023       History:  Reason for visit:  Ms. Bowie is seen today at the request of Lazarus Clark MD for a follow-up hearing evaluation. Patient has a history of bilateral conductive hearing loss, tinnitus of the left ear, and bilateral eustachian tube dysfunction. Patient had bilateral myringotomy with insertion of T-tubes on 2021. She also had a left myringoplasty on 10/31/2022. Previous audio was on 2022. She feels her hearing has gotten a little bit worse in both ears.     PE Tubes:  yes, both ears   Other otologic surgical history: yes, left ear, myringoplasty  Tinnitus:  yes, occasional ringing in both ears   Dizziness:  no  Noise Exposure: yes, marching band, mowing, guns (right-handed) with hearing protection  Aural Fullness:  yes, both ears  Otalgia: yes, both ears  Family history of hearing loss: yes, maternal grandparents and mother  Other significant history:  high blood pressure, type II diabetes, cpap use  Head trauma requiring hospital stay: no  Previous brain MRI: no    EVALUATION:               RESULTS:    Otoscopic Evaluation:  Bilateral: clear canal, tympanic membrane visualized and scarring on tympanic membrane noted         Tympanometry (226 Hz):  Right: Type A- normal, some negative pressure present  Left: Type As- low static compliance         Pure Tone Audiometry (via inserts with good reliability):    Bilateral: normal sloping to mild conductive hearing loss         Speech Audiometry:   Right: Speech Reception Threshold (SRT) was obtained at 25 dBHL  Word Recognition scores-  96% (excellent/within normal limits, %)  Presented at 65dBHL with 45dB of masking in opposite ear  Using NU-6 List 1A, 25 words  Left: Speech Reception Threshold (SRT) was obtained at 20 dBHL  Word Recognition scores-  100% (excellent/within normal limits, %)   Presented at 60dBHL  with 40dB of masking in opposite ear  Using NU-6 List 4A, 25 words      IMPRESSIONS:  Tympanometry showed normal middle ear pressure and static compliance, for the right ear. Tympanometry showed normal middle ear pressure with decreased static compliance, consistent with hypomobile tympanic membrane, for the left ear. Pure tone thresholds for both ears show a normal sloping to mild conductive hearing loss, suggesting abnormal outer/middle ear function and normal cochlear/retrocochlear function. Patient was counseled with regard to the findings.      Diagnosis:   1. Conductive hearing loss, bilateral    2. Tinnitus of both ears    3. Dysfunction of both eustachian tubes    4. S/P myringotomy with insertion of tube    5. History of myringoplasty        RECOMMENDATIONS/PLAN:  Follow-up recommendations per VIRGIL Jovel    Audiologic follow-up in 1 year  Return for audiologic testing if you begin to changes in hearing or concerns arise  Use communication strategies such as looking at the speaker when they talk, have them get your attention before they speak, have them rephrase instead of repeat if you cannot understand them, limit background noise and be in the same room as the speaker  Use hearing protection around loud noises such as lawn equipment, power tools, and firearms  Avoid silence when possible. Sleep with white noise/fan, or listen to nature sounds     EDUCATION:  Discussed results and recommendations with patient. Questions were addressed and the patient was encouraged to contact our department should concerns arise.      Trisha Munoz [facility]  Licensed Audiologist

## 2023-12-18 ENCOUNTER — PROCEDURE VISIT (OUTPATIENT)
Dept: OTOLARYNGOLOGY | Facility: CLINIC | Age: 38
End: 2023-12-18
Payer: COMMERCIAL

## 2023-12-18 ENCOUNTER — OFFICE VISIT (OUTPATIENT)
Dept: OTOLARYNGOLOGY | Facility: CLINIC | Age: 38
End: 2023-12-18
Payer: COMMERCIAL

## 2023-12-18 VITALS
BODY MASS INDEX: 32.39 KG/M2 | HEART RATE: 88 BPM | TEMPERATURE: 98.7 F | RESPIRATION RATE: 16 BRPM | HEIGHT: 60 IN | SYSTOLIC BLOOD PRESSURE: 131 MMHG | WEIGHT: 165 LBS | DIASTOLIC BLOOD PRESSURE: 87 MMHG

## 2023-12-18 DIAGNOSIS — H90.0 CONDUCTIVE HEARING LOSS, BILATERAL: Primary | ICD-10-CM

## 2023-12-18 DIAGNOSIS — Z98.890 S/P FESS (FUNCTIONAL ENDOSCOPIC SINUS SURGERY): Primary | ICD-10-CM

## 2023-12-18 DIAGNOSIS — Z98.890 S/P SINUS SURGERY: ICD-10-CM

## 2023-12-18 DIAGNOSIS — Z98.890 H/O MYRINGOPLASTY: ICD-10-CM

## 2023-12-18 DIAGNOSIS — H69.93 DYSFUNCTION OF BOTH EUSTACHIAN TUBES: ICD-10-CM

## 2023-12-18 DIAGNOSIS — Z98.890 HISTORY OF MYRINGOPLASTY: ICD-10-CM

## 2023-12-18 DIAGNOSIS — Z96.22 S/P MYRINGOTOMY WITH INSERTION OF TUBE: ICD-10-CM

## 2023-12-18 DIAGNOSIS — H93.13 TINNITUS OF BOTH EARS: ICD-10-CM

## 2023-12-18 DIAGNOSIS — H92.03 OTALGIA OF BOTH EARS: ICD-10-CM

## 2023-12-18 DIAGNOSIS — H90.0 CONDUCTIVE HEARING LOSS, BILATERAL: ICD-10-CM

## 2023-12-18 RX ORDER — INSULIN DEGLUDEC INJECTION 100 U/ML
INJECTION, SOLUTION SUBCUTANEOUS
COMMUNITY
Start: 2023-10-04

## 2023-12-18 RX ORDER — ACYCLOVIR 400 MG/1
TABLET ORAL SEE ADMIN INSTRUCTIONS
COMMUNITY
Start: 2023-12-02

## 2023-12-18 RX ORDER — GUAIFENESIN 600 MG/1
1 TABLET, EXTENDED RELEASE ORAL EVERY 12 HOURS SCHEDULED
COMMUNITY
Start: 2023-11-07

## 2023-12-18 RX ORDER — INSULIN ASPART 100 [IU]/ML
INJECTION, SOLUTION INTRAVENOUS; SUBCUTANEOUS
COMMUNITY
Start: 2023-10-03

## 2023-12-18 RX ORDER — ESCITALOPRAM OXALATE 20 MG/1
TABLET, FILM COATED ORAL
COMMUNITY
Start: 2023-08-01

## 2023-12-18 NOTE — PATIENT INSTRUCTIONS
CONTACT INFORMATION:  The main office phone number is [phone]. For emergencies after hours and on weekends, this number will convert over to our answering service and the on call provider will answer. Please try to keep non emergent phone calls/ questions to office hours 9am-5pm Monday through Friday.      [employer]  As an alternative, you can sign up and use the Epic MyChart system for more direct and quicker access for non emergent questions/ problems.  [email or web address] allows you to send messages to your doctor, view your test results, renew your prescriptions, schedule appointments, and more. To sign up, go to [employer] and click on the Sign Up Now link in the New User? box. Enter your [employer] Activation Code exactly as it appears below along with the last four digits of your Social Security Number and your Date of Birth () to complete the sign-up process. If you do not sign up before the expiration date, you must request a new code.     [employer] Activation Code: Activation code not generated  Current [employer] Status: Active     If you have questions, you can email [employer] Online [email or web address] or call [phone] to talk to our [employer] staff. Remember, [employer] is NOT to be used for urgent needs. For medical emergencies, dial 911.     IF YOU SMOKE OR USE TOBACCO PLEASE READ THE FOLLOWING:  Why is smoking bad for me?  Smoking increases the risk of heart disease, lung disease, vascular disease, stroke, and cancer. If you smoke, STOP!        IF YOU SMOKE OR USE TOBACCO PLEASE READ THE FOLLOWING:  Why is smoking bad for me?  Smoking increases the risk of heart disease, lung disease, vascular disease, stroke, and cancer. If you smoke, STOP!     For more information:  Quit Now [place]  -QUIT-NOW  [email or web address].[email or web address]/en-[email or web address]

## 2023-12-18 NOTE — PROGRESS NOTES
YOB: 1985  Location: [facility]  Location Address: [address] [place] [address], [facility], [address] [place] [address]  Location Phone: [phone]    Chief Complaint   Patient presents with    Ear Problem    Sinus Problem       History of Present Illness  Shante Bowie is a 38 y.o. female.  Shante Bowie is here for follow up of ENT complaints. The patient has had problems with left ear fullness. She states that this got worse a few weeks ago when she had covid and strep but it has seemed to improve today.    The patient is s/p septoplasty, inferior turbinate reduction, left qian bullosa resection adenoidectomy and left myringoplasty 10/31/2022.  She has a history of right t-tube placement.      Past Medical History:   Diagnosis Date    Asthma     At least 5-10 years    Depression     Diabetic acidosis, type II     Ear infection     Hard to intubate     Hearing loss     frequent ear infections    High blood pressure     History of transfusion         HL (hearing loss)     Migraines     Polycystic ovary syndrome     Rosacea     Sinusitis 2022    Sleep apnea     wears c pap       Past Surgical History:   Procedure Laterality Date    ADENOIDECTOMY      BREAST SURGERY Bilateral 2002    augmentation on left, reduction on right    COSMETIC SURGERY      D & C AND LAPAROSCOPY      DILATATION AND CURETTAGE      2022    ENDOSCOPIC FUNCTIONAL SINUS SURGERY (FESS) N/A 10/31/2022    Procedure: IMAGE GUIDED SEPTOPLASTY, RESECTION INFERIOR TURBINATES, LEFT QIAN BULLOSA RESECTION WITH LEFT ANTROSTOMY, WITH ADENOIDECTOMY, MYRINGOPLASTY WITH LEFT T TUBE PLACEMENT;  Surgeon: Lazarus Clark MD;  Location: [facility];  Service: ENT;  Laterality: N/A;    FEMUR FRACTURE SURGERY Left     FOOT TENDON SURGERY Left     KNEE ACL RECONSTRUCTION Left     MYRINGOPLASTY Left 10/31/2022    Procedure: MYRINGOPLASTY WITH LEFT T TUBE PLACEMENT;  Surgeon: Lazarus Clark MD;  Location: [facility] OR;  Service: ENT;  Laterality:  Left;    MYRINGOTOMY W/ TUBES      pt states has had 22 sets if tubes    MYRINGOTOMY W/ TUBES Bilateral 03/19/2021    Procedure: BILATERAL MYRINGOTOMY WITH INSERTION OF EAR T-TUBES;  Surgeon: Lazarus Clark MD;  Location: [facility];  Service: ENT;  Laterality: Bilateral;    SINUS SURGERY  2022    TONSILLECTOMY         Outpatient Medications Marked as Taking for the 12/18/23 encounter (Office Visit) with Negrito Orellana APRN   Medication Sig Dispense Refill    azelastine (ASTELIN) 0.1 % nasal spray 2 sprays into the nostril(s) as directed by provider 2 (Two) Times a Day. Use in each nostril as directed 30 mL 11    busPIRone (BUSPAR) 10 MG tablet Take 1 tablet by mouth Every 6 (Six) Hours.      Continuous Blood Gluc Sensor (Dexcom G7 Sensor) misc See Admin Instructions.      fluticasone (FLONASE) 50 MCG/ACT nasal spray 2 sprays into the nostril(s) as directed by provider Daily. 16 g 11    guaiFENesin (MUCINEX) 600 MG 12 hr tablet Take 1 tablet by mouth Every 12 (Twelve) Hours.      labetalol (NORMODYNE) 100 MG tablet Take 2 tablets by mouth 2 (Two) Times a Day.      levocetirizine (XYZAL) 5 MG tablet Take 1 tablet by mouth Every Evening.      Lexapro 20 MG tablet       loratadine (CLARITIN) 10 MG tablet Take 1 tablet by mouth Daily.      NovoLOG FlexPen 100 UNIT/ML solution pen-injector sc pen INJECT 7 UNITS UNDER THE SKIN W/ EACH MEAL      Prenatal Vit-Fe Fumarate-FA (PRENATAL VITAMINS PO) Take 1 tablet by mouth Daily.      Tresiba FlexTouch 100 UNIT/ML solution pen-injector injection ADMINISTER 20 UNITS UNDER THE SKIN EVERY DAY.      triamterene-hydrochlorothiazide (DYAZIDE) 37.5-25 MG per capsule Take 1 capsule by mouth Every Morning.         Gold-containing drug products    Family History   Problem Relation Age of Onset    Asthma Mother     Rashes / Skin problems Mother     Hypertension Father     Deep vein thrombosis Father     Diabetes Maternal Grandmother        Social History     Socioeconomic History     Marital status:    Tobacco Use    Smoking status: Never    Smokeless tobacco: Never   Vaping Use    Vaping Use: Never used   Substance and Sexual Activity    Alcohol use: Not Currently     Comment: occ    Drug use: Never    Sexual activity: Defer       Review of Systems   Constitutional: Negative.    HENT: Negative.     Neurological: Negative.        Vitals:    12/18/23 0915   BP: 131/87   Pulse: 88   Resp: 16   Temp: 98.7 °F (37.1 °C)       Body mass index is 32.22 kg/m².    Objective     Physical Exam  Vitals reviewed.   Constitutional:       Appearance: Normal appearance. She is obese.   HENT:      Head: Normocephalic and atraumatic.      Right Ear: External ear normal. A PE tube is present.      Left Ear: External ear normal.      Ears:      Comments: Left with previous graft site       Nose: Nose normal.      Mouth/Throat:      Lips: Pink.      Mouth: Mucous membranes are moist.   Musculoskeletal:      Cervical back: Full passive range of motion without pain.   Neurological:      Mental Status: She is alert.   Psychiatric:         Behavior: Behavior is cooperative.         Assessment & Plan   Diagnoses and all orders for this visit:    1. S/P FESS (functional endoscopic sinus surgery) (Primary)    2. H/O myringoplasty    3. S/P myringotomy with insertion of tube    4. S/P sinus surgery    5. Otalgia of both ears    6. Dysfunction of both eustachian tubes    7. History of myringoplasty    8. Conductive hearing loss, bilateral      * Surgery not found *  No orders of the defined types were placed in this encounter.    Continue nasal sprays  Return for problems    Return in about 6 months (around 6/18/2024) for Recheck.       Patient Instructions   CONTACT INFORMATION:  The main office phone number is [phone]. For emergencies after hours and on weekends, this number will convert over to our answering service and the on call provider will answer. Please try to keep non emergent phone calls/ questions  to office hours 9am-5pm Monday through Friday.      [employer]  As an alternative, you can sign up and use the Epic MyChart system for more direct and quicker access for non emergent questions/ problems.  [facility] [employer] allows you to send messages to your doctor, view your test results, renew your prescriptions, schedule appointments, and more. To sign up, go to [employer] and click on the Sign Up Now link in the New User? box. Enter your [employer] Activation Code exactly as it appears below along with the last four digits of your Social Security Number and your Date of Birth () to complete the sign-up process. If you do not sign up before the expiration date, you must request a new code.     [employer] Activation Code: Activation code not generated  Current [employer] Status: Active     If you have questions, you can email [email or web address] or call [phone] to talk to our [employer] staff. Remember, [employer] is NOT to be used for urgent needs. For medical emergencies, dial 911.     IF YOU SMOKE OR USE TOBACCO PLEASE READ THE FOLLOWING:  Why is smoking bad for me?  Smoking increases the risk of heart disease, lung disease, vascular disease, stroke, and cancer. If you smoke, STOP!        IF YOU SMOKE OR USE TOBACCO PLEASE READ THE FOLLOWING:  Why is smoking bad for me?  Smoking increases the risk of heart disease, lung disease, vascular disease, stroke, and cancer. If you smoke, STOP!     For more information:  Quit Now [place]  -QUIT-NOW  [email or web address]

## 2024-03-06 ENCOUNTER — TELEPHONE (OUTPATIENT)
Dept: OTOLARYNGOLOGY | Facility: CLINIC | Age: 39
End: 2024-03-06
Payer: COMMERCIAL

## 2024-03-06 NOTE — TELEPHONE ENCOUNTER
Caller: Shante Bowie     Relationship to patient: SELF    Best call back number: [phone]     Patient is needing: PT CALLED WANTING TO SPEAK WITH SHANTE. SHE STATED THAT SHE HAS ANOTHER EAR INFECTION IN THE SAME RIGHT EAR THAT HTE TUBE HAD FALLEN OUT. SHE WAS TOLD TO CALL IF SHE DEVELOPED ANOTHER EAR IN. PLEASE REACH OUT TO THE PT TO DISCUSS OPTIONS

## 2024-03-07 ENCOUNTER — OFFICE VISIT (OUTPATIENT)
Dept: OTOLARYNGOLOGY | Facility: CLINIC | Age: 39
End: 2024-03-07
Payer: COMMERCIAL

## 2024-03-07 VITALS
HEIGHT: 60 IN | BODY MASS INDEX: 33.03 KG/M2 | DIASTOLIC BLOOD PRESSURE: 78 MMHG | HEART RATE: 99 BPM | TEMPERATURE: 97.3 F | SYSTOLIC BLOOD PRESSURE: 126 MMHG | WEIGHT: 168.25 LBS

## 2024-03-07 DIAGNOSIS — Z98.890 H/O MYRINGOPLASTY: Primary | ICD-10-CM

## 2024-03-07 DIAGNOSIS — Z96.22 S/P MYRINGOTOMY WITH INSERTION OF TUBE: ICD-10-CM

## 2024-03-07 DIAGNOSIS — H93.8X1 EAR FULLNESS, RIGHT: ICD-10-CM

## 2024-03-07 DIAGNOSIS — H92.01 RIGHT EAR PAIN: ICD-10-CM

## 2024-03-07 DIAGNOSIS — H66.006 RECURRENT ACUTE SUPPURATIVE OTITIS MEDIA WITHOUT SPONTANEOUS RUPTURE OF TYMPANIC MEMBRANE OF BOTH SIDES: ICD-10-CM

## 2024-03-07 DIAGNOSIS — Z01.10 NORMAL MIDDLE EAR TRANSMISSION BY TYMPANOGRAM: ICD-10-CM

## 2024-03-07 DIAGNOSIS — Z98.890 S/P FESS (FUNCTIONAL ENDOSCOPIC SINUS SURGERY): ICD-10-CM

## 2024-03-07 RX ORDER — LETROZOLE 2.5 MG/1
TABLET, FILM COATED ORAL
COMMUNITY
Start: 2024-03-04

## 2024-03-07 RX ORDER — MUPIROCIN CALCIUM 20 MG/G
1 CREAM TOPICAL 2 TIMES DAILY
Qty: 28 G | Refills: 0 | Status: SHIPPED | OUTPATIENT
Start: 2024-03-07 | End: 2024-03-21

## 2024-03-07 RX ORDER — ORAL SEMAGLUTIDE 14 MG/1
TABLET ORAL EVERY 24 HOURS
COMMUNITY
Start: 2024-02-08

## 2024-03-07 NOTE — PATIENT INSTRUCTIONS
Hearing test at follow up  Continue flonase and astelin  Return for problems    For the best response, use your nasal sprays every day without skipping doses. It may take several weeks before the full effect is acheived.      CONTACT INFORMATION:  The main office phone number is [phone]. For emergencies after hours and on weekends, this number will convert over to our answering service and the on call provider will answer. Please try to keep non emergent phone calls/ questions to office hours 9am-5pm Monday through Friday.      [employer]  As an alternative, you can sign up and use the Epic MyChart system for more direct and quicker access for non emergent questions/ problems.  [employer] allows you to send messages to your doctor, view your test results, renew your prescriptions, schedule appointments, and more. To sign up, go to [employer] and click on the Sign Up Now link in the New User? box. Enter your [employer] Activation Code exactly as it appears below along with the last four digits of your Social Security Number and your Date of Birth () to complete the sign-up process. If you do not sign up before the expiration date, you must request a new code.     [employer] Activation Code: Activation code not generated  Current [employer] Status: Active     If you have questions, you can email [email or web address] or call [phone] to talk to our [employer] staff. Remember, [employer] is NOT to be used for urgent needs. For medical emergencies, dial 911.     IF YOU SMOKE OR USE TOBACCO PLEASE READ THE FOLLOWING:  Why is smoking bad for me?  Smoking increases the risk of heart disease, lung disease, vascular disease, stroke, and cancer. If you smoke, STOP!        IF YOU SMOKE OR USE TOBACCO PLEASE READ THE FOLLOWING:  Why is smoking bad for me?  Smoking increases the risk of heart disease, lung disease, vascular disease, stroke, and cancer. If you smoke, STOP!     For more  information:  Quit Now [place]  1-800-QUIT-NOW  [email or web address]/en-US/

## 2024-03-07 NOTE — PROGRESS NOTES
YOB: 1985  Location: [facility]  Location Address: [address] [place] [address], [facility], [address] [place] [address]  Location Phone: [phone]    Chief Complaint   Patient presents with    Otitis Media     RECURRENT       History of Present Illness  Shante Bowie is a 39 y.o. female.  Shante Bowie is here for follow up of ENT complaints. The patient has had problems with right ear fullness, ear pain, and ear itching. She states that she continues to have recurrent ear infections. She is currently taking astelin and Flonase. She is currently undergoing fertility treatments.    The patient is s/p septoplasty, inferior turbinate reduction, left qian bullosa resection adenoidectomy and left myringoplasty 10/31/2022.  She has a history of several PE tubes with the most recent in 10/2022. She has also had a eustachian tube dilation in the past.    Past Medical History:   Diagnosis Date    Asthma     At least 5-10 years    Depression     Diabetic acidosis, type II     Ear infection     Hard to intubate     Hearing loss     frequent ear infections    High blood pressure     History of transfusion         HL (hearing loss)     Migraines     Polycystic ovary syndrome     Rosacea     Sinusitis 2022    Sleep apnea     wears c pap       Past Surgical History:   Procedure Laterality Date    ADENOIDECTOMY      BREAST SURGERY Bilateral     augmentation on left, reduction on right    COSMETIC SURGERY      D & C AND LAPAROSCOPY      DILATATION AND CURETTAGE      2022    ENDOSCOPIC FUNCTIONAL SINUS SURGERY (FESS) N/A 10/31/2022    Procedure: IMAGE GUIDED SEPTOPLASTY, RESECTION INFERIOR TURBINATES, LEFT QIAN BULLOSA RESECTION WITH LEFT ANTROSTOMY, WITH ADENOIDECTOMY, MYRINGOPLASTY WITH LEFT T TUBE PLACEMENT;  Surgeon: Lazarus Clark MD;  Location: [facility];  Service: ENT;  Laterality: N/A;    FEMUR FRACTURE SURGERY Left     FOOT TENDON SURGERY Left     KNEE ACL RECONSTRUCTION Left     MYRINGOPLASTY Left  10/31/2022    Procedure: MYRINGOPLASTY WITH LEFT T TUBE PLACEMENT;  Surgeon: Lazarus Clark MD;  Location:  [facility] OR;  Service: ENT;  Laterality: Left;    MYRINGOTOMY W/ TUBES      pt states has had 22 sets if tubes    MYRINGOTOMY W/ TUBES Bilateral 03/19/2021    Procedure: BILATERAL MYRINGOTOMY WITH INSERTION OF EAR T-TUBES;  Surgeon: Lazarus Clark MD;  Location:  [facility] OR;  Service: ENT;  Laterality: Bilateral;    SINUS SURGERY  2022    TONSILLECTOMY         Outpatient Medications Marked as Taking for the 3/7/24 encounter (Office Visit) with Negrito Orellana APRN   Medication Sig Dispense Refill    azelastine (ASTELIN) 0.1 % nasal spray 2 sprays into the nostril(s) as directed by provider 2 (Two) Times a Day. Use in each nostril as directed 30 mL 11    busPIRone (BUSPAR) 10 MG tablet Take 1 tablet by mouth Every 6 (Six) Hours.      Continuous Blood Gluc Sensor (Dexcom G7 Sensor) misc See Admin Instructions.      fluticasone (FLONASE) 50 MCG/ACT nasal spray 2 sprays into the nostril(s) as directed by provider Daily. 16 g 11    guaiFENesin (MUCINEX) 600 MG 12 hr tablet Take 1 tablet by mouth Every 12 (Twelve) Hours.      labetalol (NORMODYNE) 100 MG tablet Take 2 tablets by mouth 2 (Two) Times a Day.      letrozole (FEMARA) 2.5 MG tablet TAKE 2 TABLETS BY MOUTH ON CYCLE DAYS 3 THROUGH 7      Lexapro 20 MG tablet       NovoLOG FlexPen 100 UNIT/ML solution pen-injector sc pen INJECT 7 UNITS UNDER THE SKIN W/ EACH MEAL      Prenatal Vit-Fe Fumarate-FA (PRENATAL VITAMINS PO) Take 1 tablet by mouth Daily.      Rybelsus 14 MG tablet Daily.      Tresiba FlexTouch 100 UNIT/ML solution pen-injector injection ADMINISTER 20 UNITS UNDER THE SKIN EVERY DAY.      triamterene-hydrochlorothiazide (DYAZIDE) 37.5-25 MG per capsule Take 1 capsule by mouth Every Morning.      [DISCONTINUED] levocetirizine (XYZAL) 5 MG tablet Take 1 tablet by mouth Every Evening.      [DISCONTINUED] loratadine (CLARITIN) 10 MG tablet Take 1  tablet by mouth Daily.         Gold-containing drug products    Family History   Problem Relation Age of Onset    Asthma Mother     Rashes / Skin problems Mother     Hypertension Father     Deep vein thrombosis Father     Diabetes Maternal Grandmother        Social History     Socioeconomic History    Marital status:    Tobacco Use    Smoking status: Never    Smokeless tobacco: Never   Vaping Use    Vaping status: Never Used   Substance and Sexual Activity    Alcohol use: Not Currently     Comment: occ    Drug use: Never    Sexual activity: Defer       Review of Systems   Constitutional: Negative.    HENT:  Positive for ear pain.         Admits ear fullness and ear itching       Vitals:    03/07/24 1058   BP: 126/78   Pulse: 99   Temp: 97.3 °F (36.3 °C)       Body mass index is 32.86 kg/m².    Objective     Physical Exam  Vitals reviewed.   Constitutional:       Appearance: Normal appearance. She is obese.   HENT:      Head: Normocephalic and atraumatic.      Right Ear: External ear normal. Tympanic membrane is scarred.      Left Ear: External ear normal. Tympanic membrane is scarred.      Ears:      Comments: Left with previous graft site    I performed tympanograms on the bilateral ears to measure the middle ear pressure. The results were: Type A tympanograms bilaterally. H69.83       Nose: Nose normal.      Mouth/Throat:      Lips: Pink.      Mouth: Mucous membranes are moist.   Musculoskeletal:      Cervical back: Full passive range of motion without pain.   Neurological:      Mental Status: She is alert.   Psychiatric:         Behavior: Behavior is cooperative.         Assessment & Plan   Diagnoses and all orders for this visit:    1. H/O myringoplasty (Primary)  -     Comprehensive Hearing Test; Future    2. Right ear pain  -     Comprehensive Hearing Test; Future    3. Ear fullness, right  -     Comprehensive Hearing Test; Future    4. S/P FESS (functional endoscopic sinus surgery)    5. Recurrent  acute suppurative otitis media without spontaneous rupture of tympanic membrane of both sides    6. S/P myringotomy with insertion of tube  -     Comprehensive Hearing Test; Future    7. Normal middle ear transmission by tympanogram    Other orders  -     mupirocin (BACTROBAN) 2 % cream; Apply 1 Application topically to the appropriate area as directed 2 (Two) Times a Day for 14 days.  Dispense: 28 g; Refill: 0      * Surgery not found *  Orders Placed This Encounter   Procedures    Comprehensive Hearing Test     Standing Status:   Future     Standing Expiration Date:   3/7/2025     Order Specific Question:   Laterality     Answer:   Bilateral     Order Specific Question:   Release to patient     Answer:   Routine Release [[id number]]     Hearing test at follow up  Continue flonase and astelin  Return for problems    Return for next available with Dr. Clark and audio.       Patient Instructions   Hearing test at follow up  Continue flonase and astelin  Return for problems    For the best response, use your nasal sprays every day without skipping doses. It may take several weeks before the full effect is acheived.      CONTACT INFORMATION:  The main office phone number is [phone]. For emergencies after hours and on weekends, this number will convert over to our answering service and the on call provider will answer. Please try to keep non emergent phone calls/ questions to office hours 9am-5pm Monday through Friday.      [email or web address]  As an alternative, you can sign up and use the Epic MyChart system for more direct and quicker access for non emergent questions/ problems.  [facility] [email or web address] allows you to send messages to your doctor, view your test results, renew your prescriptions, schedule appointments, and more. To sign up, go to [employer] and click on the Sign Up Now link in the New User? box. Enter your [email or web address] Activation Code exactly as it appears below along with the last four digits of  your Social Security Number and your Date of Birth () to complete the sign-up process. If you do not sign up before the expiration date, you must request a new code.     [employer] Activation Code: Activation code not generated  Current [employer] Status: Active     If you have questions, you can email [email or web address] or call [phone] to talk to our [facility] staff. Remember, [facility] is NOT to be used for urgent needs. For medical emergencies, dial 911.     IF YOU SMOKE OR USE TOBACCO PLEASE READ THE FOLLOWING:  Why is smoking bad for me?  Smoking increases the risk of heart disease, lung disease, vascular disease, stroke, and cancer. If you smoke, STOP!        IF YOU SMOKE OR USE TOBACCO PLEASE READ THE FOLLOWING:  Why is smoking bad for me?  Smoking increases the risk of heart disease, lung disease, vascular disease, stroke, and cancer. If you smoke, STOP!     For more information:  Quit Now [place]  -QUIT-NOW  [email or web address]

## 2024-04-15 NOTE — PROGRESS NOTES
YOB: 1985  Location: [facility]  Location Address: [address] [place] [address], [facility], [address] [place] [address]  Location Phone: [phone]    Chief Complaint   Patient presents with    Ear Problem    Sinus Problem    Hearing Loss       History of Present Illness  Shante Bowie is a 39 y.o. female.  Shante Bowie is here for follow up of ENT complaints. The patient has had problems with conductive hearing loss   She has a history of bilateral myringotomy with t tube insertion 3/19/2021 and left myringoplasty 10/31/2022  She continues to complain of intermittent ear pain and pressure and feels as if her hearing is worse. She states this has been progressing over the past 6 months       She is also s/p septoplasty, inferior turbinate reduction, left adrián bullosa resection 10/31/2022    Procedure visit with Lakisha Acosta AUD (2024)      Past Medical History:   Diagnosis Date    Asthma     At least 5-10 years    Depression     Diabetic acidosis, type II     Ear infection     Hard to intubate     Hearing loss     frequent ear infections    High blood pressure     History of transfusion         HL (hearing loss)     Migraines     Polycystic ovary syndrome     Rosacea     Sinusitis 2022    Sleep apnea     wears c pap       Past Surgical History:   Procedure Laterality Date    ADENOIDECTOMY      BREAST SURGERY Bilateral     augmentation on left, reduction on right    COSMETIC SURGERY      D & C AND LAPAROSCOPY      DILATATION AND CURETTAGE      2022    ENDOSCOPIC FUNCTIONAL SINUS SURGERY (FESS) N/A 10/31/2022    Procedure: IMAGE GUIDED SEPTOPLASTY, RESECTION INFERIOR TURBINATES, LEFT ADRIÁN BULLOSA RESECTION WITH LEFT ANTROSTOMY, WITH ADENOIDECTOMY, MYRINGOPLASTY WITH LEFT T TUBE PLACEMENT;  Surgeon: Lazarus Clark MD;  Location: [facility];  Service: ENT;  Laterality: N/A;    FEMUR FRACTURE SURGERY Left     FOOT TENDON SURGERY Left     KNEE ACL RECONSTRUCTION Left     MYRINGOPLASTY  Left 10/31/2022    Procedure: MYRINGOPLASTY WITH LEFT T TUBE PLACEMENT;  Surgeon: Lazarus Clark MD;  Location:  [facility];  Service: ENT;  Laterality: Left;    MYRINGOTOMY W/ TUBES      pt states has had 22 sets if tubes    MYRINGOTOMY W/ TUBES Bilateral 03/19/2021    Procedure: BILATERAL MYRINGOTOMY WITH INSERTION OF EAR T-TUBES;  Surgeon: Lazarus Clark MD;  Location:  [facility];  Service: ENT;  Laterality: Bilateral;    SINUS SURGERY  2022    TONSILLECTOMY         Outpatient Medications Marked as Taking for the 4/16/24 encounter (Office Visit) with Lazarus Clark MD   Medication Sig Dispense Refill    azelastine (ASTELIN) 0.1 % nasal spray 2 sprays into the nostril(s) as directed by provider 2 (Two) Times a Day. Use in each nostril as directed 30 mL 11    busPIRone (BUSPAR) 10 MG tablet Take 1 tablet by mouth Every 6 (Six) Hours.      Continuous Blood Gluc Sensor (Dexcom G7 Sensor) misc See Admin Instructions.      fluticasone (FLONASE) 50 MCG/ACT nasal spray 2 sprays into the nostril(s) as directed by provider Daily. 16 g 11    guaiFENesin (MUCINEX) 600 MG 12 hr tablet Take 1 tablet by mouth Every 12 (Twelve) Hours.      labetalol (NORMODYNE) 100 MG tablet Take 2 tablets by mouth 2 (Two) Times a Day.      letrozole (FEMARA) 2.5 MG tablet TAKE 2 TABLETS BY MOUTH ON CYCLE DAYS 3 THROUGH 7      Lexapro 20 MG tablet       Prenatal Vit-Fe Fumarate-FA (PRENATAL VITAMINS PO) Take 1 tablet by mouth Daily.      Rybelsus 14 MG tablet Daily.      triamterene-hydrochlorothiazide (DYAZIDE) 37.5-25 MG per capsule Take 1 capsule by mouth Every Morning.         Gold-containing drug products    Family History   Problem Relation Age of Onset    Asthma Mother     Rashes / Skin problems Mother     Hypertension Father     Deep vein thrombosis Father     Diabetes Maternal Grandmother        Social History     Socioeconomic History    Marital status:    Tobacco Use    Smoking status: Never    Smokeless tobacco:  Never   Vaping Use    Vaping status: Never Used   Substance and Sexual Activity    Alcohol use: Not Currently     Comment: occ    Drug use: Never    Sexual activity: Defer       Review of Systems   Constitutional: Negative.    HENT:  Positive for hearing loss.        Vitals:    04/16/24 1535   Temp: 98 °F (36.7 °C)       Body mass index is 33.79 kg/m².    Objective     Physical Exam  Vitals reviewed.   Constitutional:       Appearance: She is obese.   HENT:      Ears:        Comments: Right with tympanosclerosis with monomer noted   Left with postoperative changes noted previous myringoplasty        Nose:      Comments: Mild left nasal septal crusting        Mouth/Throat:      Lips: Pink.      Mouth: Mucous membranes are moist.      Comments: Brooks III     Neurological:      Mental Status: She is alert.       Dr. Clark has examined and assessed the patient and agrees with current treatment plan        Assessment & Plan   Diagnoses and all orders for this visit:    1. S/P myringotomy with insertion of tube (Primary)    2. Conductive hearing loss, bilateral    3. Recurrent acute suppurative otitis media without spontaneous rupture of tympanic membrane of both sides    4. S/P FESS (functional endoscopic sinus surgery)    5. Nasal crusting      * Surgery not found *  No orders of the defined types were placed in this encounter.    No follow-ups on file.     Hearing aids discussed   Continue nasal sprays daily   Hearing protection   Mupirocin recommended to left nares   Hearing test in one year       There are no Patient Instructions on file for this visit.

## 2024-04-15 NOTE — PROGRESS NOTES
FOLLOW-UP AUDIOMETRIC EVALUATION      Name:  Shanet Bowie  :  1985  Age:  39 y.o.  Date of Evaluation:  2024       History:  Reason for visit:  Ms. Bowie is seen today at the request of VIRGIL Jovel for a follow-up hearing evaluation. Patient has a history of bilateral conductive hearing loss, bilateral tinnitus, bilateral otalgia, and bilateral eustachian tube dysfunction. Patient had bilateral myringotomy with insertion of T-tubes on 2021 and a left myringoplasty on 10/31/2022. Previous audio was on 2023. She feels her hearing in both ears have declined since the previous hearing test.      PE Tubes:  yes, both ears   Other otologic surgical history: yes, left ear, myringoplasty  Tinnitus:  no, both ears  Dizziness:  no  Noise Exposure: yes, marching band, mowing, guns (right-handed) with hearing protection  Aural Fullness:  yes, both ears  Otalgia: yes, both ears  Family history of hearing loss: yes, maternal grandparents and mother  Other significant history:  high blood pressure, type II diabetes, cpap use  Head trauma requiring hospital stay: no  Previous brain MRI: no    EVALUATION:                    RESULTS:    Otoscopic Evaluation:  Bilateral: clear canal, abnormal tympanic membrane visualized         Tympanometry (226 Hz):  Right: Type A- normal  Left: Type As- low static compliance    Pure Tone Audiometry (via inserts with good reliability):    Bilateral: slight relatively flat conductive hearing loss         Speech Audiometry:   Bilateral: Speech Reception Threshold (SRT) was obtained at 25 dBHL  Word Recognition scores-  100% (excellent/within normal limits, %)  Presented at 65dBHL with 45dB of masking in opposite ear  Using NU-6 List 3A, 25 words each ear      IMPRESSIONS:  Tympanometry showed normal middle ear pressure and static compliance, for the right ear. Tympanometry showed normal middle ear pressure with decreased static compliance, consistent with hypomobile  tympanic membrane, for the left ear. Pure tone thresholds for both ears show a slight relatively flat conductive hearing loss, suggesting abnormal outer/middle ear function and normal cochlear/retrocochlear function. No significant changes compared to previous audio on 12/18/2023. Patient was counseled with regard to the findings.    Amplification needs:  Patient could benefit from amplification if they feel increased communication difficulties.    Diagnosis:   1. Conductive hearing loss, bilateral    2. Otalgia of both ears    3. History of myringoplasty        RECOMMENDATIONS/PLAN:  Follow-up recommendations per Lazarus Clark MD    Audiologic follow-up in 1 year  Return for audiologic testing if you begin to changes in hearing or concerns arise  Hearing aid evaluation and counseling upon medical clearance and patient motivation  Use communication strategies such as looking at the speaker when they talk, have them get your attention before they speak, have them rephrase instead of repeat if you cannot understand them, limit background noise and be in the same room as the speaker  Use hearing protection around loud noises such as lawn equipment, power tools, and firearms     EDUCATION:  Provided list of hearing aid places  Discussed results and recommendations with patient. Questions were addressed and the patient was encouraged to contact our department should concerns arise.      Samson Munoz., CCC-A, F-AAA  Licensed Audiologist

## 2024-04-16 ENCOUNTER — OFFICE VISIT (OUTPATIENT)
Dept: OTOLARYNGOLOGY | Facility: CLINIC | Age: 39
End: 2024-04-16
Payer: COMMERCIAL

## 2024-04-16 ENCOUNTER — PROCEDURE VISIT (OUTPATIENT)
Dept: OTOLARYNGOLOGY | Facility: CLINIC | Age: 39
End: 2024-04-16
Payer: COMMERCIAL

## 2024-04-16 VITALS — HEIGHT: 60 IN | WEIGHT: 173 LBS | TEMPERATURE: 98 F | BODY MASS INDEX: 33.96 KG/M2

## 2024-04-16 DIAGNOSIS — H92.03 OTALGIA OF BOTH EARS: ICD-10-CM

## 2024-04-16 DIAGNOSIS — Z98.890 HISTORY OF MYRINGOPLASTY: ICD-10-CM

## 2024-04-16 DIAGNOSIS — J34.89 NASAL CRUSTING: ICD-10-CM

## 2024-04-16 DIAGNOSIS — H90.0 CONDUCTIVE HEARING LOSS, BILATERAL: Primary | ICD-10-CM

## 2024-04-16 DIAGNOSIS — H90.0 CONDUCTIVE HEARING LOSS, BILATERAL: ICD-10-CM

## 2024-04-16 DIAGNOSIS — Z96.22 S/P MYRINGOTOMY WITH INSERTION OF TUBE: Primary | ICD-10-CM

## 2024-04-16 DIAGNOSIS — H69.93 DYSFUNCTION OF BOTH EUSTACHIAN TUBES: ICD-10-CM

## 2024-04-16 DIAGNOSIS — Z98.890 S/P FESS (FUNCTIONAL ENDOSCOPIC SINUS SURGERY): ICD-10-CM

## 2024-04-16 DIAGNOSIS — H66.006 RECURRENT ACUTE SUPPURATIVE OTITIS MEDIA WITHOUT SPONTANEOUS RUPTURE OF TYMPANIC MEMBRANE OF BOTH SIDES: ICD-10-CM

## 2024-07-17 ENCOUNTER — OFFICE VISIT (OUTPATIENT)
Dept: NEUROLOGY | Age: 39
End: 2024-07-17
Payer: COMMERCIAL

## 2024-07-17 VITALS
WEIGHT: 176 LBS | HEIGHT: 60 IN | HEART RATE: 84 BPM | SYSTOLIC BLOOD PRESSURE: 108 MMHG | OXYGEN SATURATION: 99 % | BODY MASS INDEX: 34.55 KG/M2 | DIASTOLIC BLOOD PRESSURE: 75 MMHG

## 2024-07-17 DIAGNOSIS — G47.33 OBSTRUCTIVE SLEEP APNEA: Primary | ICD-10-CM

## 2024-07-17 DIAGNOSIS — Z78.9 DIFFICULTY USING CONTINUOUS POSITIVE AIRWAY PRESSURE (CPAP) DEVICE: ICD-10-CM

## 2024-07-17 PROCEDURE — 99213 OFFICE O/P EST LOW 20 MIN: CPT | Performed by: PHYSICIAN ASSISTANT

## 2024-07-17 RX ORDER — INSULIN ASPART 100 [IU]/ML
INJECTION, SOLUTION INTRAVENOUS; SUBCUTANEOUS
COMMUNITY
Start: 2023-03-07

## 2024-07-17 RX ORDER — ESCITALOPRAM OXALATE 20 MG/1
1 TABLET, FILM COATED ORAL
COMMUNITY
Start: 2023-08-01

## 2024-07-17 RX ORDER — INSULIN DEGLUDEC 100 U/ML
INJECTION, SOLUTION SUBCUTANEOUS
COMMUNITY
Start: 2024-06-26

## 2024-07-17 NOTE — PROGRESS NOTES
[facility]  [facility], [address]  [place] [address]  Phone [phone]  Fax [phone]       [facility] Annual Sleep Clinic Follow Up Encounter      Information:   Patient Name: Rhina Zimmerman  :   1985  Age:   39 y.o.  MRN:   [id number]  Account #:  [id number]  Today:                24    Provider:  Malena Pal PA-C    Chief Complaint   Patient presents with    Follow-up     One year sleep follow up.        Sleep Apnea     DME report in media. Patient states no complaints.         Subjective:   Rhina Zimmerman is a 39 y.o. female who  has a past medical history of CPAP (continuous positive airway pressure) dependence, Diabetes mellitus (HCC), Ectopic pregnancy, Obstructive sleep apnea, PCOS (polycystic ovarian syndrome), and Sleep difficulties.    Rhina Zimmerman comes in for an annual sleep clinic follow up. We manage RUBY on CPAP. She was diagnosed with RUBY several years ago and used CPAP. Her device broke and she had an HST. The HST, 2019 revealed an AHI of 89.8. She is prescribed auto CPAP therapy currently with a pressure range of 8cm to 12cm.     Today she reports that she makes attempts at compliance nightly.  The compliance report indicates that she has only averaged 50% PAP usage >4 hours in the last month. She sometimes removes the mask unknowingly. The battery pack malfunctioned and she didn't use it for a couple of days. She can't wear it when she has a migraine. Her sleep is restorative with PAP use. She has been going through fertility treatment and that does cause some fatigue. The ESS score is 6. She desires to continue PAP therapy. There are noted leaks. She uses a FFM. She is aware of leaks.       Objective:     Past Medical History:   Diagnosis Date    CPAP (continuous positive airway pressure) dependence     Diabetes mellitus (HCC)     Ectopic pregnancy     Obstructive sleep apnea     AHI: 89.8 per HST, 2019    PCOS (polycystic ovarian syndrome)     Sleep

## 2024-07-17 NOTE — PATIENT INSTRUCTIONS
reports, additional testing, and face-to-face  clinical evaluation subsequent to any treatment, changes in treatment, and continued treatment.     Caution:  Please abstain from driving or engaging in other activities which may be hazardous in the presence of diminished alertness or daytime drowsiness. And avoid the use of sedatives or alcohol, which can worsen sleep apnea and daytime drowsiness.       Mask suggestions:  -     Resmed Airfit N20 (Nasal) or F20 (Full face mask).  They conform to your face, thus decreasing the potential for mask leakage. You might like the AirTouch F20(full face mask).  It has a \"memory foam\" like cushion. The AirFit F30 is a smaller style full face mask designed to sit low on and cover less of your face for fewer facial marks. AirFit N30i has a top of the head tube with a nasal mask. AirFit P10 reported to be the most comfortable nasal pillow mask. Resmed Mirage FX reported to be the most comfortable nasal mask. Resmed Mirage Wright reported to be the most comfortable hybrid mask. AirTouch N20-memory foam nasal mask.    Respironics: You might also like to try a nasal mask called a Dreamwear nasal mask or the Dreamwear nasal pillow. Another suggestion is the Tracie View, it is a minimal contact full face mask.  The Tracie View's incredible under the nose design makes it the only full face mask that won't cause red marks on the bridge of your nose when compared to other full face masks. The Dreamwear full face mask has a  soft feel, unique in-frame air-flow, and innovative air tube connection at the top of the head for the ultimate in sleep comfort. Comfort Gel Blue. Dreamwear gel pillows.    Omari [employer] Darline: Brevida nasal pillow mask and Simplus FFM    The use of a memory foam CPAP pillow supports the head and neck throughout the night.

## 2024-08-29 DIAGNOSIS — N20.0 KIDNEY STONE: Primary | ICD-10-CM

## 2024-08-29 NOTE — PROGRESS NOTES
Subjective    Ms. Bowie is 39 y.o. female    Chief Complaint: left  ureteral stone    History of Present Illness    39-year-old female new patient referred for new finding of 5 mm left proximal ureteral stone after patient presented to [facility]/15/2024 due to new onset left flank pain.  Reports a history of kidney stone x 1 many years ago with spontaneous passage.  Was treated for urine culture positive Klebsiella ciprofloxacin for which patient states she completes the full course as of this evening.  Reports has passed a couple small fragments and pain has improved but is still concerned that there may be some remaining as she is having occasional sharp shooting pain through the vaginal area.    The following portions of the patient's history were reviewed and updated as appropriate: allergies, current medications, past family history, past medical history, past social history, past surgical history and problem list.    Review of Systems   Constitutional:  Negative for chills and fever.   Gastrointestinal:  Negative for abdominal pain, anal bleeding, blood in stool, nausea and vomiting.   Genitourinary:  Positive for flank pain and vaginal pain. Negative for dysuria and hematuria.         Current Outpatient Medications:     azelastine (ASTELIN) 0.1 % nasal spray, 2 sprays into the nostril(s) as directed by provider 2 (Two) Times a Day. Use in each nostril as directed, Disp: 30 mL, Rfl: 11    busPIRone (BUSPAR) 10 MG tablet, Take 1 tablet by mouth Every 6 (Six) Hours., Disp: , Rfl:     ciprofloxacin (CIPRO) 500 MG tablet, Take 1 tablet every 12 hours by oral route for 7 days., Disp: , Rfl:     Continuous Blood Gluc Sensor (Dexcom G7 Sensor) misc, See Admin Instructions., Disp: , Rfl:     fluticasone (FLONASE) 50 MCG/ACT nasal spray, 2 sprays into the nostril(s) as directed by provider Daily., Disp: 16 g, Rfl: 11    guaiFENesin (MUCINEX) 600 MG 12 hr tablet, Take 1 tablet by mouth Every 12  (Twelve) Hours., Disp: , Rfl:     labetalol (NORMODYNE) 100 MG tablet, Take 2 tablets by mouth 2 (Two) Times a Day., Disp: , Rfl:     Lexapro 20 MG tablet, , Disp: , Rfl:     Prenatal Vit-Fe Fumarate-FA (PRENATAL VITAMINS PO), Take 1 tablet by mouth Daily., Disp: , Rfl:     triamterene-hydrochlorothiazide (DYAZIDE) 37.5-25 MG per capsule, Take 1 capsule by mouth Every Morning., Disp: , Rfl:     Past Medical History:   Diagnosis Date    Asthma     At least 5-10 years    Depression     Diabetic acidosis, type II     Ear infection     Hard to intubate     Hearing loss     frequent ear infections    High blood pressure     History of transfusion     2003    HL (hearing loss)     Migraines     Polycystic ovary syndrome     Rosacea     Sinusitis June 2022    Sleep apnea     wears c pap       Past Surgical History:   Procedure Laterality Date    ADENOIDECTOMY      BREAST SURGERY Bilateral 2002    augmentation on left, reduction on right    COSMETIC SURGERY  2002    D & C AND LAPAROSCOPY      DILATATION AND CURETTAGE      6-    ENDOSCOPIC FUNCTIONAL SINUS SURGERY (FESS) N/A 10/31/2022    Procedure: IMAGE GUIDED SEPTOPLASTY, RESECTION INFERIOR TURBINATES, LEFT ADRIÁN BULLOSA RESECTION WITH LEFT ANTROSTOMY, WITH ADENOIDECTOMY, MYRINGOPLASTY WITH LEFT T TUBE PLACEMENT;  Surgeon: Lazarus Clark MD;  [facility];  Service: ENT;  Laterality: N/A;    FEMUR FRACTURE SURGERY Left     FOOT TENDON SURGERY Left     KNEE ACL RECONSTRUCTION Left     MYRINGOPLASTY Left 10/31/2022    Procedure: MYRINGOPLASTY WITH LEFT T TUBE PLACEMENT;  Surgeon: Lazarus Clark MD;  Location:  [facility];  Service: ENT;  Laterality: Left;    MYRINGOTOMY W/ TUBES      pt states has had 22 sets if tubes    MYRINGOTOMY W/ TUBES Bilateral 03/19/2021    Procedure: BILATERAL MYRINGOTOMY WITH INSERTION OF EAR T-TUBES;  Surgeon: Lazarus Clark MD;  Location:  [facility];  Service: ENT;  Laterality: Bilateral;    SINUS SURGERY  2022     TONSILLECTOMY         Social History     Socioeconomic History    Marital status:    Tobacco Use    Smoking status: Never    Smokeless tobacco: Never   Vaping Use    Vaping status: Never Used   Substance and Sexual Activity    Alcohol use: Not Currently     Comment: occ    Drug use: Never    Sexual activity: Defer       Family History   Problem Relation Age of Onset    Asthma Mother     Rashes / Skin problems Mother     Hypertension Father     Deep vein thrombosis Father     Diabetes Maternal Grandmother        Objective    There were no vitals taken for this visit.    Physical Exam  Nursing note reviewed.   Constitutional:       General: She is not in acute distress.     Appearance: Normal appearance. She is not ill-appearing.   HENT:      Nose: No congestion.   Abdominal:      Tenderness: There is left CVA tenderness. There is no right CVA tenderness.      Hernia: No hernia is present.   Skin:     General: Skin is warm and dry.   Neurological:      Mental Status: She is alert and oriented to person, place, and time.   Psychiatric:         Mood and Affect: Mood normal.         Behavior: Behavior normal.             Results for orders placed or performed in visit on 08/30/24   POC Urinalysis Dipstick, Multipro    Specimen: Urine   Result Value Ref Range    Color Yellow Yellow, Straw, Dark Yellow, Verena    Clarity, UA Clear Clear    Glucose, UA Negative Negative mg/dL    Bilirubin Negative Negative    Ketones, UA Negative Negative    Specific Gravity  1.025 1.005 - 1.030    Blood, UA Small (A) Negative    pH, Urine 6.0 5.0 - 8.0    Protein, POC Negative Negative mg/dL    Urobilinogen, UA 0.2 E.U./dL Normal, 0.2 E.U./dL    Nitrite, UA Negative Negative    Leukocytes Small (1+) (A) Negative   XR Abdomen KUB (08/30/2024 13:51) CT outside films (08/15/2024 00:00)   Assessment and Plan    Diagnoses and all orders for this visit:    1. Ureteral stone (Primary)  -     POC Urinalysis Dipstick, Multipro  -     Case  Request; Standing  -     ECG 12 Lead; Future  -     sodium chloride 0.9 % infusion  -     CBC (No Diff); Future  -     Basic Metabolic Panel; Future  -     Urinalysis With Culture If Indicated -; Future  -     ceFAZolin (ANCEF) 2,000 mg in sodium chloride 0.9 % 100 mL IVPB  -     Case Request  -     Urine Culture - Urine, Urine, Random Void    Other orders  -     Follow Anesthesia Guidelines / Protocol; Future  -     Follow Anesthesia Guidelines / Protocol; Standing  -     Verify / Perform Chlorhexidine Skin Prep; Standing  -     Obtain Informed Consent; Future  -     Provide NPO Instructions to Patient; Future  -     Chlorhexidine Skin Prep; Future  -     XR Abdomen KUB; Standing  -     Place Sequential Compression Device; Standing  -     Maintain Sequential Compression Device; Standing      KUB today left 3 to 5 mm stone visible within the left proximal ureter    Patient would like to proceed with left ESWL.  Patient was educated on the risks and benefits of the procedure as well as potential side effects.  Patient voiced understanding and is in agreement to proceed

## 2024-08-29 NOTE — H&P (VIEW-ONLY)
Subjective    Ms. Bowie is 39 y.o. female    Chief Complaint: left  ureteral stone    History of Present Illness    39-year-old female new patient referred for new finding of 5 mm left proximal ureteral stone after patient presented to [facility] 8/15/2024 due to new onset left flank pain.  Reports a history of kidney stone x 1 many years ago with spontaneous passage.  Was treated for urine culture positive Klebsiella ciprofloxacin for which patient states she completes the full course as of this evening.  Reports has passed a couple small fragments and pain has improved but is still concerned that there may be some remaining as she is having occasional sharp shooting pain through the vaginal area.    The following portions of the patient's history were reviewed and updated as appropriate: allergies, current medications, past family history, past medical history, past social history, past surgical history and problem list.    Review of Systems   Constitutional:  Negative for chills and fever.   Gastrointestinal:  Negative for abdominal pain, anal bleeding, blood in stool, nausea and vomiting.   Genitourinary:  Positive for flank pain and vaginal pain. Negative for dysuria and hematuria.         Current Outpatient Medications:     azelastine (ASTELIN) 0.1 % nasal spray, 2 sprays into the nostril(s) as directed by provider 2 (Two) Times a Day. Use in each nostril as directed, Disp: 30 mL, Rfl: 11    busPIRone (BUSPAR) 10 MG tablet, Take 1 tablet by mouth Every 6 (Six) Hours., Disp: , Rfl:     ciprofloxacin (CIPRO) 500 MG tablet, Take 1 tablet every 12 hours by oral route for 7 days., Disp: , Rfl:     Continuous Blood Gluc Sensor (Dexcom G7 Sensor) misc, See Admin Instructions., Disp: , Rfl:     fluticasone (FLONASE) 50 MCG/ACT nasal spray, 2 sprays into the nostril(s) as directed by provider Daily., Disp: 16 g, Rfl: 11    guaiFENesin (MUCINEX) 600 MG 12 hr tablet, Take 1 tablet by mouth Every 12  (Twelve) Hours., Disp: , Rfl:     labetalol (NORMODYNE) 100 MG tablet, Take 2 tablets by mouth 2 (Two) Times a Day., Disp: , Rfl:     Lexapro 20 MG tablet, , Disp: , Rfl:     Prenatal Vit-Fe Fumarate-FA (PRENATAL VITAMINS PO), Take 1 tablet by mouth Daily., Disp: , Rfl:     triamterene-hydrochlorothiazide (DYAZIDE) 37.5-25 MG per capsule, Take 1 capsule by mouth Every Morning., Disp: , Rfl:     Past Medical History:   Diagnosis Date    Asthma     At least 5-10 years    Depression     Diabetic acidosis, type II     Ear infection     Hard to intubate     Hearing loss     frequent ear infections    High blood pressure     History of transfusion     2003    HL (hearing loss)     Migraines     Polycystic ovary syndrome     Rosacea     Sinusitis June 2022    Sleep apnea     wears c pap       Past Surgical History:   Procedure Laterality Date    ADENOIDECTOMY      BREAST SURGERY Bilateral 2002    augmentation on left, reduction on right    COSMETIC SURGERY  2002    D & C AND LAPAROSCOPY      DILATATION AND CURETTAGE      6-    ENDOSCOPIC FUNCTIONAL SINUS SURGERY (FESS) N/A 10/31/2022    Procedure: IMAGE GUIDED SEPTOPLASTY, RESECTION INFERIOR TURBINATES, LEFT ADRIÁN BULLOSA RESECTION WITH LEFT ANTROSTOMY, WITH ADENOIDECTOMY, MYRINGOPLASTY WITH LEFT T TUBE PLACEMENT;  Surgeon: Lazarus Clark MD;  Location: [facility] OR;  Service: ENT;  Laterality: N/A;    FEMUR FRACTURE SURGERY Left     FOOT TENDON SURGERY Left     KNEE ACL RECONSTRUCTION Left     MYRINGOPLASTY Left 10/31/2022    Procedure: MYRINGOPLASTY WITH LEFT T TUBE PLACEMENT;  Surgeon: Lazarus Clark MD;  Location:  [facility] OR;  Service: ENT;  Laterality: Left;    MYRINGOTOMY W/ TUBES      pt states has had 22 sets if tubes    MYRINGOTOMY W/ TUBES Bilateral 03/19/2021    Procedure: BILATERAL MYRINGOTOMY WITH INSERTION OF EAR T-TUBES;  Surgeon: Lazarus Clark MD;  Location:  [facility] OR;  Service: ENT;  Laterality: Bilateral;    SINUS SURGERY  2022     TONSILLECTOMY         Social History     Socioeconomic History    Marital status:    Tobacco Use    Smoking status: Never    Smokeless tobacco: Never   Vaping Use    Vaping status: Never Used   Substance and Sexual Activity    Alcohol use: Not Currently     Comment: occ    Drug use: Never    Sexual activity: Defer       Family History   Problem Relation Age of Onset    Asthma Mother     Rashes / Skin problems Mother     Hypertension Father     Deep vein thrombosis Father     Diabetes Maternal Grandmother        Objective    There were no vitals taken for this visit.    Physical Exam  Nursing note reviewed.   Constitutional:       General: She is not in acute distress.     Appearance: Normal appearance. She is not ill-appearing.   HENT:      Nose: No congestion.   Abdominal:      Tenderness: There is left CVA tenderness. There is no right CVA tenderness.      Hernia: No hernia is present.   Skin:     General: Skin is warm and dry.   Neurological:      Mental Status: She is alert and oriented to person, place, and time.   Psychiatric:         Mood and Affect: Mood normal.         Behavior: Behavior normal.             Results for orders placed or performed in visit on 08/30/24   POC Urinalysis Dipstick, Multipro    Specimen: Urine   Result Value Ref Range    Color Yellow Yellow, Straw, Dark Yellow, Verena    Clarity, UA Clear Clear    Glucose, UA Negative Negative mg/dL    Bilirubin Negative Negative    Ketones, UA Negative Negative    Specific Gravity  1.025 1.005 - 1.030    Blood, UA Small (A) Negative    pH, Urine 6.0 5.0 - 8.0    Protein, POC Negative Negative mg/dL    Urobilinogen, UA 0.2 E.U./dL Normal, 0.2 E.U./dL    Nitrite, UA Negative Negative    Leukocytes Small (1+) (A) Negative   XR Abdomen KUB (08/30/2024 13:51) CT outside films (08/15/2024 00:00)   Assessment and Plan    Diagnoses and all orders for this visit:    1. Ureteral stone (Primary)  -     POC Urinalysis Dipstick, Multipro  -     Case  Request; Standing  -     ECG 12 Lead; Future  -     sodium chloride 0.9 % infusion  -     CBC (No Diff); Future  -     Basic Metabolic Panel; Future  -     Urinalysis With Culture If Indicated -; Future  -     ceFAZolin (ANCEF) 2,000 mg in sodium chloride 0.9 % 100 mL IVPB  -     Case Request  -     Urine Culture - Urine, Urine, Random Void    Other orders  -     Follow Anesthesia Guidelines / Protocol; Future  -     Follow Anesthesia Guidelines / Protocol; Standing  -     Verify / Perform Chlorhexidine Skin Prep; Standing  -     Obtain Informed Consent; Future  -     Provide NPO Instructions to Patient; Future  -     Chlorhexidine Skin Prep; Future  -     XR Abdomen KUB; Standing  -     Place Sequential Compression Device; Standing  -     Maintain Sequential Compression Device; Standing      KUB today left 3 to 5 mm stone visible within the left proximal ureter    Patient would like to proceed with left ESWL.  Patient was educated on the risks and benefits of the procedure as well as potential side effects.  Patient voiced understanding and is in agreement to proceed

## 2024-08-30 ENCOUNTER — OFFICE VISIT (OUTPATIENT)
Dept: UROLOGY | Facility: CLINIC | Age: 39
End: 2024-08-30
Payer: COMMERCIAL

## 2024-08-30 ENCOUNTER — HOSPITAL ENCOUNTER (OUTPATIENT)
Dept: GENERAL RADIOLOGY | Facility: HOSPITAL | Age: 39
Discharge: HOME | End: 2024-08-30
Payer: COMMERCIAL

## 2024-08-30 DIAGNOSIS — N20.1 URETERAL STONE: Primary | ICD-10-CM

## 2024-08-30 DIAGNOSIS — N20.0 KIDNEY STONE: ICD-10-CM

## 2024-08-30 LAB
BILIRUB BLD-MCNC: NEGATIVE MG/DL
CLARITY, POC: CLEAR
COLOR UR: YELLOW
GLUCOSE UR STRIP-MCNC: NEGATIVE MG/DL
KETONES UR QL: NEGATIVE
LEUKOCYTE EST, POC: ABNORMAL
NITRITE UR-MCNC: NEGATIVE MG/ML
PH UR: 6 [PH] (ref 5–8)
PROT UR STRIP-MCNC: NEGATIVE MG/DL
RBC # UR STRIP: ABNORMAL /UL
SP GR UR: 1.02 (ref 1–1.03)
UROBILINOGEN UR QL: ABNORMAL

## 2024-08-30 PROCEDURE — 74018 RADEX ABDOMEN 1 VIEW: CPT

## 2024-08-30 PROCEDURE — 87086 URINE CULTURE/COLONY COUNT: CPT

## 2024-08-30 RX ORDER — CIPROFLOXACIN 500 MG/1
TABLET, FILM COATED ORAL
COMMUNITY
Start: 2024-08-23 | End: 2024-09-06 | Stop reason: HOSPADM

## 2024-08-30 RX ORDER — SODIUM CHLORIDE 9 MG/ML
100 INJECTION, SOLUTION INTRAVENOUS CONTINUOUS
Status: CANCELLED | OUTPATIENT
Start: 2024-08-30

## 2024-08-31 LAB — BACTERIA SPEC AEROBE CULT: NO GROWTH

## 2024-09-03 ENCOUNTER — TELEPHONE (OUTPATIENT)
Dept: UROLOGY | Facility: CLINIC | Age: 39
End: 2024-09-03
Payer: COMMERCIAL

## 2024-09-03 NOTE — TELEPHONE ENCOUNTER
Placed call to patient to inform her of her results. She voiced understanding. Denied any questions.

## 2024-09-04 ENCOUNTER — TELEPHONE (OUTPATIENT)
Dept: UROLOGY | Facility: CLINIC | Age: 39
End: 2024-09-04
Payer: COMMERCIAL

## 2024-09-04 NOTE — TELEPHONE ENCOUNTER
Called patient to remind them to arrive at patient registration on 9/6 at 0800 for the procedure with Dr. Almanzar. Spoke with patient. Told patient if they had any questions to please contact our office at [phone].

## 2024-09-05 ENCOUNTER — PRE-ADMISSION TESTING (OUTPATIENT)
Dept: PREADMISSION TESTING | Facility: HOSPITAL | Age: 39
End: 2024-09-05
Payer: COMMERCIAL

## 2024-09-05 VITALS
SYSTOLIC BLOOD PRESSURE: 179 MMHG | BODY MASS INDEX: 33.17 KG/M2 | HEART RATE: 106 BPM | RESPIRATION RATE: 18 BRPM | HEIGHT: 61 IN | WEIGHT: 175.71 LBS | OXYGEN SATURATION: 96 % | DIASTOLIC BLOOD PRESSURE: 90 MMHG

## 2024-09-05 DIAGNOSIS — N20.1 URETERAL STONE: ICD-10-CM

## 2024-09-05 LAB
ANION GAP SERPL CALCULATED.3IONS-SCNC: 10 MMOL/L (ref 5–15)
BACTERIA UR QL AUTO: ABNORMAL /HPF
BILIRUB UR QL STRIP: NEGATIVE
BUN SERPL-MCNC: 30 MG/DL (ref 6–20)
BUN/CREAT SERPL: 19.2 (ref 7–25)
CALCIUM SPEC-SCNC: 9 MG/DL (ref 8.6–10.5)
CHLORIDE SERPL-SCNC: 100 MMOL/L (ref 98–107)
CLARITY UR: CLEAR
CO2 SERPL-SCNC: 25 MMOL/L (ref 22–29)
COLOR UR: YELLOW
CREAT SERPL-MCNC: 1.56 MG/DL (ref 0.57–1)
DEPRECATED RDW RBC AUTO: 44.9 FL (ref 37–54)
EGFRCR SERPLBLD CKD-EPI 2021: 43.2 ML/MIN/1.73
ERYTHROCYTE [DISTWIDTH] IN BLOOD BY AUTOMATED COUNT: 13.7 % (ref 12.3–15.4)
GLUCOSE SERPL-MCNC: 112 MG/DL (ref 65–99)
GLUCOSE UR STRIP-MCNC: NEGATIVE MG/DL
HCT VFR BLD AUTO: 37 % (ref 34–46.6)
HGB BLD-MCNC: 11.9 G/DL (ref 12–15.9)
HGB UR QL STRIP.AUTO: NEGATIVE
HYALINE CASTS UR QL AUTO: ABNORMAL /LPF
KETONES UR QL STRIP: NEGATIVE
LEUKOCYTE ESTERASE UR QL STRIP.AUTO: ABNORMAL
MCH RBC QN AUTO: 29 PG (ref 26.6–33)
MCHC RBC AUTO-ENTMCNC: 32.2 G/DL (ref 31.5–35.7)
MCV RBC AUTO: 90 FL (ref 79–97)
NITRITE UR QL STRIP: NEGATIVE
PH UR STRIP.AUTO: 6.5 [PH] (ref 5–8)
PLATELET # BLD AUTO: 713 10*3/MM3 (ref 140–450)
PMV BLD AUTO: 8.1 FL (ref 6–12)
POTASSIUM SERPL-SCNC: 4 MMOL/L (ref 3.5–5.2)
PROT UR QL STRIP: NEGATIVE
RBC # BLD AUTO: 4.11 10*6/MM3 (ref 3.77–5.28)
RBC # UR STRIP: ABNORMAL /HPF
REF LAB TEST METHOD: ABNORMAL
SODIUM SERPL-SCNC: 135 MMOL/L (ref 136–145)
SP GR UR STRIP: 1.02 (ref 1–1.03)
SQUAMOUS #/AREA URNS HPF: ABNORMAL /HPF
UROBILINOGEN UR QL STRIP: ABNORMAL
WBC # UR STRIP: ABNORMAL /HPF
WBC NRBC COR # BLD AUTO: 9.52 10*3/MM3 (ref 3.4–10.8)

## 2024-09-05 PROCEDURE — 85027 COMPLETE CBC AUTOMATED: CPT

## 2024-09-05 PROCEDURE — 87077 CULTURE AEROBIC IDENTIFY: CPT

## 2024-09-05 PROCEDURE — 81001 URINALYSIS AUTO W/SCOPE: CPT

## 2024-09-05 PROCEDURE — 87186 SC STD MICRODIL/AGAR DIL: CPT

## 2024-09-05 PROCEDURE — 87181 SC STD AGAR DILUTION PER AGT: CPT

## 2024-09-05 PROCEDURE — 93005 ELECTROCARDIOGRAM TRACING: CPT

## 2024-09-05 PROCEDURE — 93010 ELECTROCARDIOGRAM REPORT: CPT | Performed by: INTERNAL MEDICINE

## 2024-09-05 PROCEDURE — 36415 COLL VENOUS BLD VENIPUNCTURE: CPT

## 2024-09-05 PROCEDURE — 80048 BASIC METABOLIC PNL TOTAL CA: CPT

## 2024-09-05 PROCEDURE — 87086 URINE CULTURE/COLONY COUNT: CPT

## 2024-09-05 RX ORDER — INSULIN DEGLUDEC 100 U/ML
21 INJECTION, SOLUTION SUBCUTANEOUS NIGHTLY
COMMUNITY

## 2024-09-05 NOTE — DISCHARGE INSTRUCTIONS

## 2024-09-06 ENCOUNTER — APPOINTMENT (OUTPATIENT)
Dept: GENERAL RADIOLOGY | Facility: HOSPITAL | Age: 39
End: 2024-09-06
Payer: COMMERCIAL

## 2024-09-06 ENCOUNTER — HOSPITAL ENCOUNTER (OUTPATIENT)
Facility: HOSPITAL | Age: 39
Setting detail: HOSPITAL OUTPATIENT SURGERY
Discharge: HOME | End: 2024-09-06
Attending: UROLOGY | Admitting: UROLOGY
Payer: COMMERCIAL

## 2024-09-06 ENCOUNTER — ANESTHESIA EVENT (OUTPATIENT)
Dept: PERIOP | Facility: HOSPITAL | Age: 39
End: 2024-09-06
Payer: COMMERCIAL

## 2024-09-06 ENCOUNTER — ANESTHESIA (OUTPATIENT)
Dept: PERIOP | Facility: HOSPITAL | Age: 39
End: 2024-09-06
Payer: COMMERCIAL

## 2024-09-06 VITALS
OXYGEN SATURATION: 96 % | RESPIRATION RATE: 16 BRPM | HEART RATE: 70 BPM | SYSTOLIC BLOOD PRESSURE: 132 MMHG | DIASTOLIC BLOOD PRESSURE: 82 MMHG | TEMPERATURE: 97.6 F

## 2024-09-06 DIAGNOSIS — N20.1 URETERAL STONE: ICD-10-CM

## 2024-09-06 LAB
B-HCG UR QL: NEGATIVE
GLUCOSE BLDC GLUCOMTR-MCNC: 119 MG/DL (ref 70–130)
GLUCOSE BLDC GLUCOMTR-MCNC: 153 MG/DL (ref 70–130)
QT INTERVAL: 364 MS
QTC INTERVAL: 483 MS

## 2024-09-06 PROCEDURE — 25010000002 ONDANSETRON PER 1 MG: Performed by: NURSE ANESTHETIST, CERTIFIED REGISTERED

## 2024-09-06 PROCEDURE — 25010000002 CEFAZOLIN PER 500 MG

## 2024-09-06 PROCEDURE — 74018 RADEX ABDOMEN 1 VIEW: CPT

## 2024-09-06 PROCEDURE — 50590 FRAGMENTING OF KIDNEY STONE: CPT | Performed by: UROLOGY

## 2024-09-06 PROCEDURE — 25010000002 FUROSEMIDE PER 20 MG: Performed by: NURSE ANESTHETIST, CERTIFIED REGISTERED

## 2024-09-06 PROCEDURE — 82948 REAGENT STRIP/BLOOD GLUCOSE: CPT

## 2024-09-06 PROCEDURE — 25010000002 FENTANYL CITRATE (PF) 100 MCG/2ML SOLUTION: Performed by: NURSE ANESTHETIST, CERTIFIED REGISTERED

## 2024-09-06 PROCEDURE — 25010000002 MIDAZOLAM PER 1MG: Performed by: ANESTHESIOLOGY

## 2024-09-06 PROCEDURE — 25810000003 LACTATED RINGERS PER 1000 ML: Performed by: UROLOGY

## 2024-09-06 PROCEDURE — 81025 URINE PREGNANCY TEST: CPT | Performed by: UROLOGY

## 2024-09-06 PROCEDURE — 25010000002 DEXAMETHASONE PER 1 MG: Performed by: NURSE ANESTHETIST, CERTIFIED REGISTERED

## 2024-09-06 PROCEDURE — 25010000002 PROPOFOL 10 MG/ML EMULSION: Performed by: NURSE ANESTHETIST, CERTIFIED REGISTERED

## 2024-09-06 RX ORDER — HYDROCODONE BITARTRATE AND ACETAMINOPHEN 10; 325 MG/1; MG/1
1 TABLET ORAL EVERY 4 HOURS PRN
Status: DISCONTINUED | OUTPATIENT
Start: 2024-09-06 | End: 2024-09-06 | Stop reason: HOSPADM

## 2024-09-06 RX ORDER — TAMSULOSIN HYDROCHLORIDE 0.4 MG/1
1 CAPSULE ORAL DAILY
Qty: 30 CAPSULE | Refills: 0 | Status: SHIPPED | OUTPATIENT
Start: 2024-09-06 | End: 2024-09-06 | Stop reason: SDUPTHER

## 2024-09-06 RX ORDER — PROPOFOL 10 MG/ML
VIAL (ML) INTRAVENOUS AS NEEDED
Status: DISCONTINUED | OUTPATIENT
Start: 2024-09-06 | End: 2024-09-06 | Stop reason: SURG

## 2024-09-06 RX ORDER — ROCURONIUM BROMIDE 10 MG/ML
INJECTION, SOLUTION INTRAVENOUS AS NEEDED
Status: DISCONTINUED | OUTPATIENT
Start: 2024-09-06 | End: 2024-09-06 | Stop reason: SURG

## 2024-09-06 RX ORDER — HYDROCODONE BITARTRATE AND ACETAMINOPHEN 5; 325 MG/1; MG/1
1 TABLET ORAL EVERY 6 HOURS PRN
Qty: 15 TABLET | Refills: 0 | Status: SHIPPED | OUTPATIENT
Start: 2024-09-06

## 2024-09-06 RX ORDER — DROPERIDOL 2.5 MG/ML
0.62 INJECTION, SOLUTION INTRAMUSCULAR; INTRAVENOUS ONCE AS NEEDED
Status: DISCONTINUED | OUTPATIENT
Start: 2024-09-06 | End: 2024-09-06 | Stop reason: HOSPADM

## 2024-09-06 RX ORDER — ONDANSETRON 2 MG/ML
INJECTION INTRAMUSCULAR; INTRAVENOUS AS NEEDED
Status: DISCONTINUED | OUTPATIENT
Start: 2024-09-06 | End: 2024-09-06 | Stop reason: SURG

## 2024-09-06 RX ORDER — SODIUM CHLORIDE 0.9 % (FLUSH) 0.9 %
3 SYRINGE (ML) INJECTION AS NEEDED
Status: DISCONTINUED | OUTPATIENT
Start: 2024-09-06 | End: 2024-09-06 | Stop reason: HOSPADM

## 2024-09-06 RX ORDER — TAMSULOSIN HYDROCHLORIDE 0.4 MG/1
1 CAPSULE ORAL DAILY
Qty: 30 CAPSULE | Refills: 0 | Status: SHIPPED | OUTPATIENT
Start: 2024-09-06

## 2024-09-06 RX ORDER — HYDROCODONE BITARTRATE AND ACETAMINOPHEN 5; 325 MG/1; MG/1
1 TABLET ORAL EVERY 4 HOURS PRN
Status: DISCONTINUED | OUTPATIENT
Start: 2024-09-06 | End: 2024-09-06 | Stop reason: HOSPADM

## 2024-09-06 RX ORDER — DEXAMETHASONE SODIUM PHOSPHATE 4 MG/ML
INJECTION, SOLUTION INTRA-ARTICULAR; INTRALESIONAL; INTRAMUSCULAR; INTRAVENOUS; SOFT TISSUE AS NEEDED
Status: DISCONTINUED | OUTPATIENT
Start: 2024-09-06 | End: 2024-09-06 | Stop reason: SURG

## 2024-09-06 RX ORDER — HYDROCODONE BITARTRATE AND ACETAMINOPHEN 5; 325 MG/1; MG/1
1 TABLET ORAL ONCE AS NEEDED
Status: DISCONTINUED | OUTPATIENT
Start: 2024-09-06 | End: 2024-09-06 | Stop reason: HOSPADM

## 2024-09-06 RX ORDER — FLUMAZENIL 0.1 MG/ML
0.2 INJECTION INTRAVENOUS AS NEEDED
Status: DISCONTINUED | OUTPATIENT
Start: 2024-09-06 | End: 2024-09-06 | Stop reason: HOSPADM

## 2024-09-06 RX ORDER — IBUPROFEN 600 MG/1
600 TABLET, FILM COATED ORAL EVERY 6 HOURS PRN
Status: DISCONTINUED | OUTPATIENT
Start: 2024-09-06 | End: 2024-09-06 | Stop reason: HOSPADM

## 2024-09-06 RX ORDER — SODIUM CHLORIDE 0.9 % (FLUSH) 0.9 %
3-10 SYRINGE (ML) INJECTION AS NEEDED
Status: DISCONTINUED | OUTPATIENT
Start: 2024-09-06 | End: 2024-09-06 | Stop reason: HOSPADM

## 2024-09-06 RX ORDER — KETOROLAC TROMETHAMINE 10 MG/1
10 TABLET, FILM COATED ORAL EVERY 6 HOURS PRN
Qty: 12 TABLET | Refills: 0 | Status: SHIPPED | OUTPATIENT
Start: 2024-09-06

## 2024-09-06 RX ORDER — ACETAMINOPHEN 500 MG
1000 TABLET ORAL ONCE
Status: COMPLETED | OUTPATIENT
Start: 2024-09-06 | End: 2024-09-06

## 2024-09-06 RX ORDER — SODIUM CHLORIDE 0.9 % (FLUSH) 0.9 %
3 SYRINGE (ML) INJECTION EVERY 12 HOURS SCHEDULED
Status: DISCONTINUED | OUTPATIENT
Start: 2024-09-06 | End: 2024-09-06 | Stop reason: HOSPADM

## 2024-09-06 RX ORDER — SCOLOPAMINE TRANSDERMAL SYSTEM 1 MG/1
1 PATCH, EXTENDED RELEASE TRANSDERMAL ONCE
Status: DISCONTINUED | OUTPATIENT
Start: 2024-09-06 | End: 2024-09-06 | Stop reason: HOSPADM

## 2024-09-06 RX ORDER — NALOXONE HCL 0.4 MG/ML
0.4 VIAL (ML) INJECTION AS NEEDED
Status: DISCONTINUED | OUTPATIENT
Start: 2024-09-06 | End: 2024-09-06 | Stop reason: HOSPADM

## 2024-09-06 RX ORDER — MIDAZOLAM HYDROCHLORIDE 2 MG/2ML
1 INJECTION, SOLUTION INTRAMUSCULAR; INTRAVENOUS
Status: DISCONTINUED | OUTPATIENT
Start: 2024-09-06 | End: 2024-09-06 | Stop reason: HOSPADM

## 2024-09-06 RX ORDER — LABETALOL HYDROCHLORIDE 5 MG/ML
5 INJECTION, SOLUTION INTRAVENOUS
Status: DISCONTINUED | OUTPATIENT
Start: 2024-09-06 | End: 2024-09-06 | Stop reason: HOSPADM

## 2024-09-06 RX ORDER — LIDOCAINE HYDROCHLORIDE 20 MG/ML
INJECTION, SOLUTION EPIDURAL; INFILTRATION; INTRACAUDAL; PERINEURAL AS NEEDED
Status: DISCONTINUED | OUTPATIENT
Start: 2024-09-06 | End: 2024-09-06 | Stop reason: SURG

## 2024-09-06 RX ORDER — DOCUSATE SODIUM 100 MG/1
100 CAPSULE, LIQUID FILLED ORAL 2 TIMES DAILY
Qty: 60 CAPSULE | Refills: 1 | Status: SHIPPED | OUTPATIENT
Start: 2024-09-06

## 2024-09-06 RX ORDER — SODIUM CHLORIDE, SODIUM LACTATE, POTASSIUM CHLORIDE, CALCIUM CHLORIDE 600; 310; 30; 20 MG/100ML; MG/100ML; MG/100ML; MG/100ML
100 INJECTION, SOLUTION INTRAVENOUS CONTINUOUS
Status: DISCONTINUED | OUTPATIENT
Start: 2024-09-06 | End: 2024-09-06 | Stop reason: HOSPADM

## 2024-09-06 RX ORDER — MIDAZOLAM HYDROCHLORIDE 2 MG/2ML
2 INJECTION, SOLUTION INTRAMUSCULAR; INTRAVENOUS ONCE
Status: COMPLETED | OUTPATIENT
Start: 2024-09-06 | End: 2024-09-06

## 2024-09-06 RX ORDER — SODIUM CHLORIDE, SODIUM LACTATE, POTASSIUM CHLORIDE, CALCIUM CHLORIDE 600; 310; 30; 20 MG/100ML; MG/100ML; MG/100ML; MG/100ML
1000 INJECTION, SOLUTION INTRAVENOUS CONTINUOUS
Status: DISCONTINUED | OUTPATIENT
Start: 2024-09-06 | End: 2024-09-06 | Stop reason: HOSPADM

## 2024-09-06 RX ORDER — ONDANSETRON 4 MG/1
4 TABLET, ORALLY DISINTEGRATING ORAL EVERY 6 HOURS PRN
Qty: 6 TABLET | Refills: 1 | Status: SHIPPED | OUTPATIENT
Start: 2024-09-06

## 2024-09-06 RX ORDER — SODIUM CHLORIDE 9 MG/ML
100 INJECTION, SOLUTION INTRAVENOUS CONTINUOUS
Status: DISCONTINUED | OUTPATIENT
Start: 2024-09-06 | End: 2024-09-06 | Stop reason: HOSPADM

## 2024-09-06 RX ORDER — SODIUM CHLORIDE 9 MG/ML
40 INJECTION, SOLUTION INTRAVENOUS AS NEEDED
Status: DISCONTINUED | OUTPATIENT
Start: 2024-09-06 | End: 2024-09-06 | Stop reason: HOSPADM

## 2024-09-06 RX ORDER — FUROSEMIDE 10 MG/ML
INJECTION INTRAMUSCULAR; INTRAVENOUS AS NEEDED
Status: DISCONTINUED | OUTPATIENT
Start: 2024-09-06 | End: 2024-09-06 | Stop reason: SURG

## 2024-09-06 RX ORDER — SUCCINYLCHOLINE/SOD CL,ISO/PF 200MG/10ML
SYRINGE (ML) INTRAVENOUS AS NEEDED
Status: DISCONTINUED | OUTPATIENT
Start: 2024-09-06 | End: 2024-09-06 | Stop reason: SURG

## 2024-09-06 RX ORDER — LIDOCAINE HYDROCHLORIDE 10 MG/ML
0.5 INJECTION, SOLUTION EPIDURAL; INFILTRATION; INTRACAUDAL; PERINEURAL ONCE AS NEEDED
Status: DISCONTINUED | OUTPATIENT
Start: 2024-09-06 | End: 2024-09-06 | Stop reason: HOSPADM

## 2024-09-06 RX ORDER — DOCUSATE SODIUM 100 MG/1
100 CAPSULE, LIQUID FILLED ORAL 2 TIMES DAILY
Qty: 60 CAPSULE | Refills: 1 | Status: SHIPPED | OUTPATIENT
Start: 2024-09-06 | End: 2024-09-06 | Stop reason: SDUPTHER

## 2024-09-06 RX ORDER — FENTANYL CITRATE 50 UG/ML
50 INJECTION, SOLUTION INTRAMUSCULAR; INTRAVENOUS
Status: DISCONTINUED | OUTPATIENT
Start: 2024-09-06 | End: 2024-09-06 | Stop reason: HOSPADM

## 2024-09-06 RX ORDER — ONDANSETRON 2 MG/ML
4 INJECTION INTRAMUSCULAR; INTRAVENOUS ONCE AS NEEDED
Status: DISCONTINUED | OUTPATIENT
Start: 2024-09-06 | End: 2024-09-06 | Stop reason: HOSPADM

## 2024-09-06 RX ORDER — FENTANYL CITRATE 50 UG/ML
INJECTION, SOLUTION INTRAMUSCULAR; INTRAVENOUS AS NEEDED
Status: DISCONTINUED | OUTPATIENT
Start: 2024-09-06 | End: 2024-09-06 | Stop reason: SURG

## 2024-09-06 RX ADMIN — MIDAZOLAM HYDROCHLORIDE 2 MG: 1 INJECTION, SOLUTION INTRAMUSCULAR; INTRAVENOUS at 11:20

## 2024-09-06 RX ADMIN — CEFAZOLIN 2000 MG: 2 INJECTION, POWDER, FOR SOLUTION INTRAMUSCULAR; INTRAVENOUS at 11:48

## 2024-09-06 RX ADMIN — Medication 180 MG: at 11:38

## 2024-09-06 RX ADMIN — LIDOCAINE HYDROCHLORIDE 100 MG: 20 INJECTION, SOLUTION EPIDURAL; INFILTRATION; INTRACAUDAL; PERINEURAL at 11:38

## 2024-09-06 RX ADMIN — ROCURONIUM BROMIDE 5 MG: 10 INJECTION, SOLUTION INTRAVENOUS at 11:38

## 2024-09-06 RX ADMIN — ONDANSETRON 4 MG: 2 INJECTION INTRAMUSCULAR; INTRAVENOUS at 11:55

## 2024-09-06 RX ADMIN — SODIUM CHLORIDE, POTASSIUM CHLORIDE, SODIUM LACTATE AND CALCIUM CHLORIDE 1000 ML: 600; 310; 30; 20 INJECTION, SOLUTION INTRAVENOUS at 10:24

## 2024-09-06 RX ADMIN — SCOPALAMINE 1 PATCH: 1 PATCH, EXTENDED RELEASE TRANSDERMAL at 10:30

## 2024-09-06 RX ADMIN — ACETAMINOPHEN 1000 MG: 500 TABLET, FILM COATED ORAL at 10:30

## 2024-09-06 RX ADMIN — PROPOFOL 200 MG: 10 INJECTION, EMULSION INTRAVENOUS at 11:38

## 2024-09-06 RX ADMIN — FENTANYL CITRATE 100 MCG: 50 INJECTION, SOLUTION INTRAMUSCULAR; INTRAVENOUS at 11:38

## 2024-09-06 RX ADMIN — DEXAMETHASONE SODIUM PHOSPHATE 4 MG: 4 INJECTION INTRA-ARTICULAR; INTRALESIONAL; INTRAMUSCULAR; INTRAVENOUS; SOFT TISSUE at 11:55

## 2024-09-06 RX ADMIN — FUROSEMIDE 10 MG: 10 INJECTION, SOLUTION INTRAMUSCULAR; INTRAVENOUS at 11:50

## 2024-09-06 NOTE — ANESTHESIA PREPROCEDURE EVALUATION
Anesthesia Evaluation     Patient summary reviewed   history of anesthetic complications:  difficult airway  NPO Solid Status: > 8 hours             Airway   Mallampati: III  TM distance: <3 FB  Neck ROM: limited  Possible difficult intubation and Difficult intubation highly probable  Dental - normal exam     Pulmonary    (-) COPD, asthma, sleep apnea, not a smoker  Cardiovascular   Exercise tolerance: excellent (>7 METS)    (+) hypertension  (-) pacemaker, past MI, angina, cardiac stents      Neuro/Psych  (-) seizures, TIA, CVA  GI/Hepatic/Renal/Endo    (+) obesity, diabetes mellitus type 2  (-) GERD, liver disease, no renal disease    Musculoskeletal     Abdominal   (+) obese   Substance History      OB/GYN    (-)  Pregnant        Other                          Anesthesia Plan    ASA 2     general     (Reports unable to be intubated at [facility] June 2022; anterior with minimal neck opening )  intravenous induction     Anesthetic plan, risks, benefits, and alternatives have been provided, discussed and informed consent has been obtained with: patient.

## 2024-09-06 NOTE — OP NOTE
OP NOTE  Shante Bowie    Date of Procedure: 9/6/2024    Pre-op Diagnosis:   Left Ureteral stone [N20.1]    Post-op Diagnosis:     Post-Op Diagnosis Codes:   Left  * Ureteral stone [N20.1]    Procedure/CPT® Codes:      Procedure(s):  LEFT EXTRACORPOREAL SHOCKWAVE LITHOTRIPSY    Surgeon(s):  Andres Almanzar MD    Anesthesia: General    Staff:   Circulator: Kala Way RN; Eduardo John RN    Indications:   39-year-old female with 4 to 5 mm left proximal ureteral stone that failed to pass.   After discussion of options, patient has given informed consent to undergo left ESWL.  All risks, benefits, and alternatives were discussed.    Operative Report: The patient is identified. The KUB is reviewed. After answering any questions, patient was brought to the operating room and placed on the patient table of the [facility].  General endotracheal anesthesia was administered.  Preoperative KUB was reviewed.   An [facility] c-arm fluoroscope was used to identify the stone.    The shock-head is brought into position.  This stone is brought into the F2 plane for focus.  Treatment was initiated.  We gradually increased the energy level from 1 to 7.  This stone was treated at a rate of 60.  The stone was periodically checked to make sure that we were on it and getting good breakup.  We checked at 700, 1000, 1500, 2000, and 2500 shocks.  The stone did have good partial breakup.  I felt it was unnecessary to place a ureteral stent.  At the conclusion of 4000 shocks, the patient was awakened from anesthesia and transferred to the recovery room in satisfactory condition.      Estimated Blood Loss: none    Specimens:                None      Drains: * No LDAs found *    Complications: none       Andres Almanzar MD     Date: 9/6/2024  Time: 12:10 CDT

## 2024-09-06 NOTE — ANESTHESIA POSTPROCEDURE EVALUATION
Patient: Shante Bowie    Procedure Summary       Date: 09/06/24 Room / Location:  [facility] OR 08 /  [facility] OR    Anesthesia Start: 1132 Anesthesia Stop: 1310    Procedure: EXTRACORPOREAL SHOCKWAVE LITHOTRIPSY-left (Left) Diagnosis:       Ureteral stone      (Ureteral stone [N20.1])    Surgeons: Andres Almanzar MD Provider: Sean Nuñez CRNA    Anesthesia Type: general ASA Status: 2            Anesthesia Type: general    Vitals  Vitals Value Taken Time   /81 09/06/24 1404   Temp 97.6 °F (36.4 °C) 09/06/24 1345   Pulse 82 09/06/24 1410   Resp 16 09/06/24 1403   SpO2 93 % 09/06/24 1410   Vitals shown include unfiled device data.        Post Anesthesia Care and Evaluation    Patient location during evaluation: PHASE II  Patient participation: complete - patient participated  Level of consciousness: awake and awake and alert  Pain score: 0  Pain management: adequate    Airway patency: patent  Anesthetic complications: No anesthetic complications  PONV Status: none  Cardiovascular status: acceptable  Respiratory status: acceptable  Hydration status: acceptable    Comments: Patient discharged according to acceptable Kalia score per RN assessment. See nursing records for further information.     Blood pressure 121/81, pulse 73, temperature 97.6 °F (36.4 °C), temperature source Temporal, resp. rate 16, last menstrual period 09/05/2024, SpO2 96%, not currently breastfeeding.

## 2024-09-06 NOTE — ANESTHESIA PROCEDURE NOTES
Airway  Urgency: elective    Date/Time: 9/6/2024 11:39 AM  Difficult airway    General Information and Staff    Patient location during procedure: OR  CRNA/CAA: Sean Nuñez CRNA    Indications and Patient Condition  Indications for airway management: airway protection    Preoxygenated: yes  Mask difficulty assessment: 1 - vent by mask    Final Airway Details  Final airway type: endotracheal airway      Successful airway: ETT  Cuffed: yes   Successful intubation technique: video laryngoscopy  Facilitating devices/methods: intubating stylet  Endotracheal tube insertion site: oral  Blade: Mancia  Blade size: 3  ETT size (mm): 7.0  Cormack-Lehane Classification: grade I - full view of glottis  Placement verified by: chest auscultation and capnometry   Cuff volume (mL): 6  Measured from: lips  ETT/EBT  to lips (cm): 19  Number of attempts at approach: 1  Assessment: lips, teeth, and gum same as pre-op and atraumatic intubation    Additional Comments  Small mouth opening and anterior glottic opening.

## 2024-09-06 NOTE — INTERVAL H&P NOTE
H&P reviewed. The patient was examined and there are no changes to the H&P.  KUB today shows persistent 4 to 5 mm left proximal ureteral stone for which she would like to proceed as scheduled with left extracorporeal shock with lithotripsy.  We discussed risks of the procedure including but not limited to infection, bleeding, need for additional procedures, inability break up/pass any/all stone, complications of anesthesia.  She voices understanding and provided informed consent to proceed with left ESWL.

## 2024-09-08 LAB — BACTERIA SPEC AEROBE CULT: ABNORMAL

## 2024-09-09 ENCOUNTER — TELEPHONE (OUTPATIENT)
Dept: UROLOGY | Facility: CLINIC | Age: 39
End: 2024-09-09
Payer: COMMERCIAL

## 2024-09-09 DIAGNOSIS — N30.00 ACUTE CYSTITIS WITHOUT HEMATURIA: Primary | ICD-10-CM

## 2024-09-09 RX ORDER — CEFDINIR 300 MG/1
300 CAPSULE ORAL 2 TIMES DAILY
Qty: 20 CAPSULE | Refills: 0 | Status: SHIPPED | OUTPATIENT
Start: 2024-09-09

## 2024-09-09 NOTE — TELEPHONE ENCOUNTER
----- Message from Nessa Ken sent at 9/9/2024  8:03 AM CDT -----  Urine culture positive bacteria have sent in cefdinir to patient's pharmacy

## 2024-09-09 NOTE — TELEPHONE ENCOUNTER
Pt. Called about her antibiotic prescription, concerned that it would spike her blood sugar. Advised pt. That for her urine culture, it was one of the options that the bacteria was susceptible to and just to monitor her blood glucose levels closely while she is on it and let us know if they spike really high. Pt. V/u.

## 2024-09-09 NOTE — TELEPHONE ENCOUNTER
Spoke with patient to let her know her urine culture was positive and that an antibiotic was sent to her pharmacy.     Patient verbalized understanding.

## 2024-09-13 NOTE — PROGRESS NOTES
Subjective    Ms. oBwie is 39 y.o. female    Chief Complaint: Ureteral stone    History of Present Illness  39-year-old female established patient follow-up after left ESWL on 9/6/2024 for 5 mm left proximal ureteral stone.  She did well after procedure and denies flank pain.  KUB x-ray done today reviewed by me with the patient shows no stone visible.    The following portions of the patient's history were reviewed and updated as appropriate: allergies, current medications, past family history, past medical history, past social history, past surgical history and problem list.    Review of Systems      Current Outpatient Medications:     azelastine (ASTELIN) 0.1 % nasal spray, 2 sprays into the nostril(s) as directed by provider 2 (Two) Times a Day. Use in each nostril as directed, Disp: 30 mL, Rfl: 11    busPIRone (BUSPAR) 10 MG tablet, Take 1 tablet by mouth Every 6 (Six) Hours., Disp: , Rfl:     cefdinir (OMNICEF) 300 MG capsule, Take 1 capsule by mouth 2 (Two) Times a Day., Disp: 20 capsule, Rfl: 0    Continuous Blood Gluc Sensor (Dexcom G7 Sensor) misc, See Admin Instructions., Disp: , Rfl:     docusate sodium (Colace) 100 MG capsule, Take 1 capsule by mouth 2 (Two) Times a Day., Disp: 60 capsule, Rfl: 1    fluticasone (FLONASE) 50 MCG/ACT nasal spray, 2 sprays into the nostril(s) as directed by provider Daily., Disp: 16 g, Rfl: 11    guaiFENesin (MUCINEX) 600 MG 12 hr tablet, Take 1 tablet by mouth Every 12 (Twelve) Hours. (Patient not taking: Reported on 9/5/2024), Disp: , Rfl:     HYDROcodone-acetaminophen (NORCO) 5-325 MG per tablet, Take 1 tablet by mouth Every 6 (Six) Hours As Needed for Severe Pain (postop pain)., Disp: 15 tablet, Rfl: 0    insulin aspart (novoLOG FLEXPEN) 100 UNIT/ML solution pen-injector sc pen, Inject 7 Units under the skin into the appropriate area as directed 3 (Three) Times a Day With Meals., Disp: , Rfl:     insulin degludec (Tresiba FlexTouch) 100 UNIT/ML solution pen-injector  injection, Inject 21 Units under the skin into the appropriate area as directed Every Night., Disp: , Rfl:     ketorolac (TORADOL) 10 MG tablet, Take 1 tablet by mouth Every 6 (Six) Hours As Needed for Moderate Pain., Disp: 12 tablet, Rfl: 0    ketorolac (TORADOL) 10 MG tablet, Take 1 tablet by mouth Every 6 (Six) Hours As Needed for Moderate Pain., Disp: 12 tablet, Rfl: 0    labetalol (NORMODYNE) 100 MG tablet, Take 2 tablets by mouth 2 (Two) Times a Day., Disp: , Rfl:     Lexapro 20 MG tablet, Take 1 tablet by mouth Daily., Disp: , Rfl:     ondansetron ODT (ZOFRAN-ODT) 4 MG disintegrating tablet, Place 1 tablet on the tongue Every 6 (Six) Hours As Needed for Nausea., Disp: 6 tablet, Rfl: 1    ondansetron ODT (ZOFRAN-ODT) 4 MG disintegrating tablet, Place 1 tablet on the tongue Every 6 (Six) Hours As Needed for Nausea., Disp: 6 tablet, Rfl: 1    Prenatal Vit-Fe Fumarate-FA (PRENATAL VITAMINS PO), Take 1 tablet by mouth Daily., Disp: , Rfl:     tamsulosin (FLOMAX) 0.4 MG capsule 24 hr capsule, Take 1 capsule by mouth Daily. For stone expulsion, Disp: 30 capsule, Rfl: 0    triamterene-hydrochlorothiazide (DYAZIDE) 37.5-25 MG per capsule, Take 1 capsule by mouth Every Morning., Disp: , Rfl:     Past Medical History:   Diagnosis Date    Anxiety     Asthma     At least 5-10 years    Depression     Diabetic acidosis, type II     Ear infection     Hard to intubate     Hearing loss     frequent ear infections    High blood pressure     History of transfusion     2003    HL (hearing loss)     Migraines     Polycystic ovary syndrome     Rosacea     Sinusitis June 2022    Sleep apnea     wears c pap       Past Surgical History:   Procedure Laterality Date    ADENOIDECTOMY      BREAST SURGERY Bilateral 2002    augmentation on left, reduction on right    COSMETIC SURGERY  2002    D & C AND LAPAROSCOPY      DILATATION AND CURETTAGE      6-    ECTOPIC PREGNANCY SURGERY  10/2023    ENDOSCOPIC FUNCTIONAL SINUS SURGERY (FESS)  N/A 10/31/2022    Procedure: IMAGE GUIDED SEPTOPLASTY, RESECTION INFERIOR TURBINATES, LEFT ADRIÁN BULLOSA RESECTION WITH LEFT ANTROSTOMY, WITH ADENOIDECTOMY, MYRINGOPLASTY WITH LEFT T TUBE PLACEMENT;  Surgeon: Lazarus Clark MD;  Location: [facility] OR;  Service: ENT;  Laterality: N/A;    EXTRACORPOREAL SHOCK WAVE LITHOTRIPSY (ESWL) Left 9/6/2024    Procedure: EXTRACORPOREAL SHOCKWAVE LITHOTRIPSY-left;  Surgeon: Andres Almanzar MD;  Location: [facility] OR;  Service: Urology;  Laterality: Left;    FEMUR FRACTURE SURGERY Left     FERTILITY SURGERY  07/2024    EGG RETREVIAL    FOOT TENDON SURGERY Left     KNEE ACL RECONSTRUCTION Left     MYRINGOPLASTY Left 10/31/2022    Procedure: MYRINGOPLASTY WITH LEFT T TUBE PLACEMENT;  Surgeon: Lazarus Clark MD;  Location: [facility] OR;  Service: ENT;  Laterality: Left;    MYRINGOTOMY W/ TUBES      pt states has had 22 sets if tubes    MYRINGOTOMY W/ TUBES Bilateral 03/19/2021    Procedure: BILATERAL MYRINGOTOMY WITH INSERTION OF EAR T-TUBES;  Surgeon: Lazarus Clark MD;  Location: [facility] OR;  Service: ENT;  Laterality: Bilateral;    SINUS SURGERY  2022    TONSILLECTOMY         Social History     Socioeconomic History    Marital status:    Tobacco Use    Smoking status: Never    Smokeless tobacco: Never   Vaping Use    Vaping status: Never Used   Substance and Sexual Activity    Alcohol use: Not Currently     Comment: occ    Drug use: Never    Sexual activity: Defer       Family History   Problem Relation Age of Onset    Asthma Mother     Rashes / Skin problems Mother     Hypertension Father     Deep vein thrombosis Father     Diabetes Maternal Grandmother        Objective    LMP 09/05/2024     Physical Exam        Results for orders placed or performed in visit on 09/30/24   POC Urinalysis Dipstick, Multipro    Specimen: Urine   Result Value Ref Range    Color Yellow Yellow, Straw, Dark Yellow, Verena    Clarity, UA Clear Clear    Glucose, UA Negative Negative  mg/dL    Bilirubin Negative Negative    Ketones, UA Trace (A) Negative    Specific Gravity  1.025 1.005 - 1.030    Blood, UA Negative Negative    pH, Urine 6.5 5.0 - 8.0    Protein, POC Negative Negative mg/dL    Urobilinogen, UA Normal Normal, 0.2 E.U./dL    Nitrite, UA Negative Negative    Leukocytes Negative Negative     Assessment and Plan    Diagnoses and all orders for this visit:    1. History of kidney stones (Primary)  -     POC Urinalysis Dipstick, Multipro  -     XR Abdomen KUB  -     US Renal Bilateral; Future      Doing well, good radiographic result after left ESWL, no stone visible on KUB today.  She will follow-up with me in 2 to 3 months with pre-clinic renal ultrasound      This document has been signed by CHACHA Almanzar MD on September 30, 2024 14:59 CDT

## 2024-09-30 ENCOUNTER — OFFICE VISIT (OUTPATIENT)
Dept: UROLOGY | Facility: CLINIC | Age: 39
End: 2024-09-30
Payer: COMMERCIAL

## 2024-09-30 DIAGNOSIS — Z87.442 HISTORY OF KIDNEY STONES: Primary | ICD-10-CM

## 2024-09-30 LAB
BILIRUB BLD-MCNC: NEGATIVE MG/DL
CLARITY, POC: CLEAR
COLOR UR: YELLOW
GLUCOSE UR STRIP-MCNC: NEGATIVE MG/DL
KETONES UR QL: ABNORMAL
LEUKOCYTE EST, POC: NEGATIVE
NITRITE UR-MCNC: NEGATIVE MG/ML
PH UR: 6.5 [PH] (ref 5–8)
PROT UR STRIP-MCNC: NEGATIVE MG/DL
RBC # UR STRIP: NEGATIVE /UL
SP GR UR: 1.02 (ref 1–1.03)
UROBILINOGEN UR QL: NORMAL

## 2024-09-30 PROCEDURE — 99024 POSTOP FOLLOW-UP VISIT: CPT | Performed by: UROLOGY

## 2024-09-30 PROCEDURE — 81003 URINALYSIS AUTO W/O SCOPE: CPT | Performed by: UROLOGY

## 2024-10-21 ENCOUNTER — OFFICE VISIT (OUTPATIENT)
Dept: OTOLARYNGOLOGY | Facility: CLINIC | Age: 39
End: 2024-10-21
Payer: COMMERCIAL

## 2024-10-21 VITALS
SYSTOLIC BLOOD PRESSURE: 153 MMHG | HEART RATE: 90 BPM | TEMPERATURE: 98.4 F | BODY MASS INDEX: 34.36 KG/M2 | WEIGHT: 182 LBS | DIASTOLIC BLOOD PRESSURE: 86 MMHG | HEIGHT: 61 IN

## 2024-10-21 DIAGNOSIS — Z96.22 S/P MYRINGOTOMY WITH INSERTION OF TUBE: ICD-10-CM

## 2024-10-21 DIAGNOSIS — Z98.890 HISTORY OF MYRINGOPLASTY: ICD-10-CM

## 2024-10-21 DIAGNOSIS — H92.03 OTALGIA OF BOTH EARS: ICD-10-CM

## 2024-10-21 DIAGNOSIS — Z98.890 S/P FESS (FUNCTIONAL ENDOSCOPIC SINUS SURGERY): ICD-10-CM

## 2024-10-21 DIAGNOSIS — H90.0 CONDUCTIVE HEARING LOSS, BILATERAL: Primary | ICD-10-CM

## 2024-10-21 PROCEDURE — 99213 OFFICE O/P EST LOW 20 MIN: CPT | Performed by: NURSE PRACTITIONER

## 2024-10-21 RX ORDER — GONADOTROPHIN, CHORIONIC 5000 UNIT
KIT INTRAMUSCULAR
COMMUNITY

## 2024-10-21 RX ORDER — ESTRADIOL VALERATE 40 MG/ML
INJECTION INTRAMUSCULAR
COMMUNITY

## 2024-10-21 RX ORDER — CHORIOGONADOTROPIN ALFA 250 UG/.5ML
INJECTION, SOLUTION SUBCUTANEOUS
COMMUNITY

## 2024-10-21 RX ORDER — PROGESTERONE 50 MG/ML
INJECTION, SOLUTION INTRAMUSCULAR
COMMUNITY
Start: 2024-08-14

## 2024-10-21 NOTE — PROGRESS NOTES
YOB: 1985  Location: [facility]  Location Address: [address] [place] [address], [facility], [address] [place] [address]  Location Phone: [phone]    Chief Complaint   Patient presents with    Ear recheck     Patient reports hearing aids seem to be helping    s/p sinus       History of Present Illness  Shante Bowie is a 39 y.o. female.  Shante Bowie is here for follow up of ENT complaints. The patient has had problems with hearing loss, ear fullness, and ear pain. She has been to [facility] in [place] and has started hearing aids. She feels that this has helped symptoms.     She has a history of bilateral myringotomy with t tube insertion 3/19/2021 and left myringoplasty 10/31/2022. She is also s/p septoplasty, inferior turbinate reduction, left qian bullosa resection 10/31/2022.      She is currently undergoing fertility treatments.      Past Medical History:   Diagnosis Date    Anxiety     Asthma     At least 5-10 years    Depression     Diabetic acidosis, type II     Ear infection     Hard to intubate     Hearing loss     frequent ear infections    High blood pressure     History of transfusion         HL (hearing loss)     Migraines     Polycystic ovary syndrome     Rosacea     Sinusitis 2022    Sleep apnea     wears c pap       Past Surgical History:   Procedure Laterality Date    ADENOIDECTOMY      BREAST SURGERY Bilateral     augmentation on left, reduction on right    COSMETIC SURGERY      D & C AND LAPAROSCOPY      DILATATION AND CURETTAGE      2022    ECTOPIC PREGNANCY SURGERY  10/2023    ENDOSCOPIC FUNCTIONAL SINUS SURGERY (FESS) N/A 10/31/2022    Procedure: IMAGE GUIDED SEPTOPLASTY, RESECTION INFERIOR TURBINATES, LEFT QIAN BULLOSA RESECTION WITH LEFT ANTROSTOMY, WITH ADENOIDECTOMY, MYRINGOPLASTY WITH LEFT T TUBE PLACEMENT;  Surgeon: Lazarus Clark MD;  Location: [facility];  Service: ENT;  Laterality: N/A;    EXTRACORPOREAL SHOCK WAVE LITHOTRIPSY (ESWL) Left 2024     Procedure: EXTRACORPOREAL SHOCKWAVE LITHOTRIPSY-left;  Surgeon: Andres Almanzar MD;  Location:  [facility];  Service: Urology;  Laterality: Left;    FEMUR FRACTURE SURGERY Left     FERTILITY SURGERY  07/2024    EGG RETREVIAL    FOOT TENDON SURGERY Left     KNEE ACL RECONSTRUCTION Left     MYRINGOPLASTY Left 10/31/2022    Procedure: MYRINGOPLASTY WITH LEFT T TUBE PLACEMENT;  Surgeon: Lazarus Clark MD;  Location:  [facility];  Service: ENT;  Laterality: Left;    MYRINGOTOMY W/ TUBES      pt states has had 22 sets if tubes    MYRINGOTOMY W/ TUBES Bilateral 03/19/2021    Procedure: BILATERAL MYRINGOTOMY WITH INSERTION OF EAR T-TUBES;  Surgeon: Lazarus Clark MD;  Location:  [facility];  Service: ENT;  Laterality: Bilateral;    SINUS SURGERY  2022    TONSILLECTOMY         Outpatient Medications Marked as Taking for the 10/21/24 encounter (Office Visit) with Negrito Orellana APRN   Medication Sig Dispense Refill    azelastine (ASTELIN) 0.1 % nasal spray 2 sprays into the nostril(s) as directed by provider 2 (Two) Times a Day. Use in each nostril as directed 30 mL 11    busPIRone (BUSPAR) 10 MG tablet Take 1 tablet by mouth Every 6 (Six) Hours.      Choriogonadotropin Flip (Ovidrel) 250 MCG/0.5ML injection INJECT 1 PREFILLED SYRINGE SUBCUTANEOUSLY (UNDER THE SKIN) FOR ONE DOSE AS DIRECTED      Chorionic Gonadotropin (Novarel) 5000 units reconstituted solution MIX 5 ML OF DILUENT INTO ALL POWDER AND INJECT 10 UNITS SUBCUTANEOUSLY (UNDER THE SKIN) EVERY OTHER DAY AS DIRECTED      Continuous Blood Gluc Sensor (Dexcom G7 Sensor) misc See Admin Instructions.      estradiol valerate (DELESTROGEN) 40 MG/ML injection INJECT 0.1-0.2 ML INTRAMUSCULARLY TWICE WEEKLY AS DIRECTED BY PHYSICIAN      fluticasone (FLONASE) 50 MCG/ACT nasal spray 2 sprays into the nostril(s) as directed by provider Daily. 16 g 11    guaiFENesin (MUCINEX) 600 MG 12 hr tablet Take 1 tablet by mouth Every 12 (Twelve) Hours.      insulin aspart  (novoLOG FLEXPEN) 100 UNIT/ML solution pen-injector sc pen Inject 7 Units under the skin into the appropriate area as directed 3 (Three) Times a Day With Meals.      insulin degludec (Tresiba FlexTouch) 100 UNIT/ML solution pen-injector injection Inject 21 Units under the skin into the appropriate area as directed Every Night.      labetalol (NORMODYNE) 100 MG tablet Take 2 tablets by mouth 2 (Two) Times a Day.      Lexapro 20 MG tablet Take 1 tablet by mouth Daily.      Prenatal Vit-Fe Fumarate-FA (PRENATAL VITAMINS PO) Take 1 tablet by mouth Daily.      progesterone oil 50 MG/ML injection INJECT 2 ML INTRAMUSCULARLY EVERY DAY AS DIRECTED BY PHYSICIAN      triamterene-hydrochlorothiazide (DYAZIDE) 37.5-25 MG per capsule Take 1 capsule by mouth Every Morning.         Gold-containing drug products    Family History   Problem Relation Age of Onset    Asthma Mother     Rashes / Skin problems Mother     Hypertension Father     Deep vein thrombosis Father     Diabetes Maternal Grandmother        Social History     Socioeconomic History    Marital status:    Tobacco Use    Smoking status: Never    Smokeless tobacco: Never   Vaping Use    Vaping status: Never Used   Substance and Sexual Activity    Alcohol use: Not Currently     Comment: occ    Drug use: Never    Sexual activity: Defer       Review of Systems   Constitutional: Negative.    HENT: Negative.         Vitals:    10/21/24 1521   BP: 153/86   Pulse: 90   Temp: 98.4 °F (36.9 °C)       Body mass index is 34.81 kg/m².    Objective     Physical Exam  Vitals reviewed.   Constitutional:       Appearance: Normal appearance. She is obese.   HENT:      Head: Normocephalic and atraumatic.      Right Ear: External ear normal. Tympanic membrane is scarred.      Left Ear: External ear normal. Tympanic membrane is scarred.      Ears:      Comments: Left with previous graft site         Nose: Nose normal.      Mouth/Throat:      Lips: Pink.      Mouth: Mucous membranes  are moist.   Musculoskeletal:      Cervical back: Full passive range of motion without pain.   Neurological:      Mental Status: She is alert.   Psychiatric:         Behavior: Behavior is cooperative.         Assessment & Plan   Diagnoses and all orders for this visit:    1. Conductive hearing loss, bilateral (Primary)    2. Otalgia of both ears    3. History of myringoplasty    4. S/P myringotomy with insertion of tube    5. S/P FESS (functional endoscopic sinus surgery)      * Surgery not found *  No orders of the defined types were placed in this encounter.    Continue nasal ointment and nasal sprays  Recommend yearly hearing test.  Patient will do this through audiology group per her request  Call if needed    Return if symptoms worsen or fail to improve.       Patient Instructions   CONTACT INFORMATION:  The main office phone number is [phone]. For emergencies after hours and on weekends, this number will convert over to our answering service and the on call provider will answer. Please try to keep non emergent phone calls/ questions to office hours 9am-5pm Monday through Friday.      [employer]  As an alternative, you can sign up and use the Epic MyChart system for more direct and quicker access for non emergent questions/ problems.  [facility] [employer] allows you to send messages to your doctor, view your test results, renew your prescriptions, schedule appointments, and more. To sign up, go to [employer]perium and click on the Sign Up Now link in the New User? box. Enter your [employer] Activation Code exactly as it appears below along with the last four digits of your Social Security Number and your Date of Birth () to complete the sign-up process. If you do not sign up before the expiration date, you must request a new code.     [employer] Activation Code: Activation code not generated  Current [employer] Status: Active     If you have questions, you can email [email or web address] or call  [phone] to talk to our MyChart staff. Remember, MyChart is NOT to be used for urgent needs. For medical emergencies, dial 911.     IF YOU SMOKE OR USE TOBACCO PLEASE READ THE FOLLOWING:  Why is smoking bad for me?  Smoking increases the risk of heart disease, lung disease, vascular disease, stroke, and cancer. If you smoke, STOP!        IF YOU SMOKE OR USE TOBACCO PLEASE READ THE FOLLOWING:  Why is smoking bad for me?  Smoking increases the risk of heart disease, lung disease, vascular disease, stroke, and cancer. If you smoke, STOP!     For more information:  Quit Now [place]  1-800-QUIT-NOW  https://[place].quitlogix.org/en-US/

## 2024-10-21 NOTE — PATIENT INSTRUCTIONS
CONTACT INFORMATION:  The main office phone number is [phone]. For emergencies after hours and on weekends, this number will convert over to our answering service and the on call provider will answer. Please try to keep non emergent phone calls/ questions to office hours 9am-5pm Monday through Friday.      [employer]  As an alternative, you can sign up and use the Epic MyChart system for more direct and quicker access for non emergent questions/ problems.  [employer] allows you to send messages to your doctor, view your test results, renew your prescriptions, schedule appointments, and more. To sign up, go to OrderMyGear and click on the Sign Up Now link in the New User? box. Enter your [employer] Activation Code exactly as it appears below along with the last four digits of your Social Security Number and your Date of Birth () to complete the sign-up process. If you do not sign up before the expiration date, you must request a new code.     [employer] Activation Code: Activation code not generated  Current [employer] Status: Active     If you have questions, you can email [email or web address] or call [phone] to talk to our [employer] staff. Remember, [employer] is NOT to be used for urgent needs. For medical emergencies, dial 911.     IF YOU SMOKE OR USE TOBACCO PLEASE READ THE FOLLOWING:  Why is smoking bad for me?  Smoking increases the risk of heart disease, lung disease, vascular disease, stroke, and cancer. If you smoke, STOP!        IF YOU SMOKE OR USE TOBACCO PLEASE READ THE FOLLOWING:  Why is smoking bad for me?  Smoking increases the risk of heart disease, lung disease, vascular disease, stroke, and cancer. If you smoke, STOP!     For more information:  Quit Now [place]  -QUIT-NOW  https://[email or web address].[email or web address]/en-US/

## 2025-01-29 ENCOUNTER — TELEMEDICINE (OUTPATIENT)
Dept: NEUROLOGY | Age: 40
End: 2025-01-29
Payer: COMMERCIAL

## 2025-01-29 DIAGNOSIS — Z99.89 CPAP (CONTINUOUS POSITIVE AIRWAY PRESSURE) DEPENDENCE: ICD-10-CM

## 2025-01-29 DIAGNOSIS — G47.33 OBSTRUCTIVE SLEEP APNEA: Primary | ICD-10-CM

## 2025-01-29 PROCEDURE — 99212 OFFICE O/P EST SF 10 MIN: CPT | Performed by: PHYSICIAN ASSISTANT

## 2025-01-29 NOTE — PATIENT INSTRUCTIONS
Suggestions for mask leaks:    Your cpap supplier should offer products that might be helpful if you otherwise like your mask. One is the Gecko nasal pad. It is an easy to use gel pad that is placed across the nose bridge and under the mask. It helps with leak in the upper part of the mask frame and is beneficial to patients who have a narrow nose bridge or who are prone to cuts or irritation to the nose bridge. You may also want to try MaskMate, a CPAP Mask Sealer and Lubricant. Another product patients find helpful are SoreSpot CPAP Skin Protectors. They are a unique liquid-filled bandage that minimizes friction and helps prevent skin irritation. Check out [employer]’s full face and nasal mask liners. They are applied directly to the silicone mask cushion to help absorb facial moisture and oils, and prevent skin irritation and pressure marks as well as to reduce noisy mask leak.         Patient education: Sleep apnea in adults       INTRODUCTION -- Normally during sleep, air moves through the throat and in and out of the lungs at a regular rhythm. In a person with sleep apnea, air movement is periodically diminished or stopped. There are two types of sleep apnea: obstructive sleep apnea and central sleep apnea. In obstructive sleep apnea, breathing is abnormal because of narrowing or closure of the throat. In central sleep apnea, breathing is abnormal because of a change in the breathing control and rhythm.  Sleep apnea is a serious condition that can affect a person's ability to safely perform normal daily activities and can affect long term health. Approximately 25 percent of adults are at risk for sleep apnea of some degree.  Men are more commonly affected than women. Other risk factors include middle and older age, being overweight or obese, and having a small mouth and throat.  This topic review focuses on the most common type of sleep apnea in adults, obstructive sleep apnea (RUBY).    HOW SLEEP APNEA OCCURS --

## 2025-01-29 NOTE — PROGRESS NOTES
[facility]  [address], [address]  [place]  [address]  Phone [phone]  Fax [phone]       2025    TELEHEALTH EVALUATION -- Audio/Visual (During COVID-19 public health emergency)      Patient:   Rhina Zimmerman  MR#:    [id number]  Account Number:                         YOB: 1985  Primary/Referring Physician:  Michael Dominique MD   Consulting Physician:   Malena Pal PA-C    NEW PATIENT CONSULTATION    OR    FOLLOW UP    Rhina Zimmerman is located at:  School/work  Also present during visit:  Nobody  Provider is located at [facility] in [place], [place]    Chief Complaint   Patient presents with    Follow-up    Sleep Apnea     DME compliance report in media & printed.         HPI:    Rhina Zimmerman (:  1985) has requested an audio/video evaluation for the following concern(s):  Sleep evaluation    Rhina Zimmerman comes in for a sleep clinic follow up. We manage RUBY on CPAP. She was diagnosed with RUBY several years ago and used CPAP. Her device broke and she had an HST. The HST, 2019 revealed an AHI of 89.8. She is prescribed auto CPAP therapy currently with a pressure range of 8cm to 12cm.     Today she reports that she is doing well with PAP use. She has noted improvement in compliance compared to 2024. The compliance report indicates a 70% usage >=4 hours in a 30 day period. She uses a FFM. She sleeps with her hands next to her face and does wake up with the headgear disconnected. Her sleep quality has improved. It is restorative. She has lost 10 lbs over the last 2 weeks with medications.       Past Medical History:   Diagnosis Date    CPAP (continuous positive airway pressure) dependence     Diabetes mellitus (HCC)     Ectopic pregnancy     Obstructive sleep apnea     AHI: 89.8 per HST, 2019    PCOS (polycystic ovarian syndrome)     Sleep difficulties        Past Surgical History:   Procedure Laterality Date    ANKLE SURGERY      left ankle tendon repair

## 2025-03-19 ENCOUNTER — LAB (OUTPATIENT)
Dept: LAB | Facility: HOSPITAL | Age: 40
End: 2025-03-19
Payer: COMMERCIAL

## 2025-03-19 ENCOUNTER — TRANSCRIBE ORDERS (OUTPATIENT)
Dept: ADMINISTRATIVE | Facility: HOSPITAL | Age: 40
End: 2025-03-19
Payer: COMMERCIAL

## 2025-05-07 ENCOUNTER — TELEPHONE (OUTPATIENT)
Dept: NEUROLOGY | Age: 40
End: 2025-05-07

## 2025-05-07 NOTE — TELEPHONE ENCOUNTER
Rhina called and left a  that her Cpap has broken and she spoke with her insurance and they told her they would cover a new machine if you could sent an order over to [facility] in [place].

## 2025-05-09 NOTE — TELEPHONE ENCOUNTER
Rhina has been called and told order was dropped and has been sent to [facility] in [place]. Rhina voiced understanding of this information.

## (undated) DEVICE — SUT GUT PLN 4/0 P3 18IN 1644G

## (undated) DEVICE — TOWEL,OR,DSP,ST,BLUE,STD,4/PK,20PK/CS: Brand: MEDLINE

## (undated) DEVICE — ELECTRD BLD EZ CLN MOD XLNG 2.75IN

## (undated) DEVICE — WIPE INST 3X3IN 2MM BX/20

## (undated) DEVICE — SPONGE,NEURO,0.5"X3",XR,STRL,LF,10/PK: Brand: MEDLINE

## (undated) DEVICE — SINU FOAM: Brand: SINU-FOAM

## (undated) DEVICE — GLV SURG BIOGEL M LTX PF 7 1/2

## (undated) DEVICE — KT ANTI FOG W/FLD AND SPNG

## (undated) DEVICE — MAJOR DOUBLE BASIN W/GOWNS II: Brand: MEDLINE INDUSTRIES, INC.

## (undated) DEVICE — BALN DIL SINUS XPRESS LP W/LT FIBR 6X18MM

## (undated) DEVICE — COLLECTION SOCK, GENERAL: Brand: DEROYAL

## (undated) DEVICE — TUBING, SUCTION, 1/4" X 12', STRAIGHT: Brand: MEDLINE

## (undated) DEVICE — 4-PORT MANIFOLD: Brand: NEPTUNE 2

## (undated) DEVICE — HDRST POSITIONING FM RND 2X9IN

## (undated) DEVICE — BAPTIST TURNOVER KIT: Brand: MEDLINE INDUSTRIES, INC.

## (undated) DEVICE — SOL IRR BSS 15ML STRL

## (undated) DEVICE — BLADE 1884012EM RAD12 4MM M4 ROTATE ROHS: Brand: FUSION®

## (undated) DEVICE — TBG PENCL TELESCP MEGADYNE SMOKE EVAC 10FT

## (undated) DEVICE — NASAL EPISTAXIS 2 PACK: Brand: XEROGEL

## (undated) DEVICE — BLD MYRNGTMY FLT ARROW/JUVENILE DISP

## (undated) DEVICE — PATIENT TRACKER 9734887XOM NON-INVASIVE

## (undated) DEVICE — STERILE COTTON BALLS LARGE 5/P: Brand: MEDLINE

## (undated) DEVICE — TRAP FLD MINIVAC MEGADYNE 100ML

## (undated) DEVICE — INSTR TRACKER 9733533 EM ENT

## (undated) DEVICE — SUT GUT CHRM 4/0 P3 18IN 1654G

## (undated) DEVICE — NDL HYPO PRECISIONGLIDE/REG 18G 11/2 PNK

## (undated) DEVICE — STANDARD HYPODERMIC NEEDLE,POLYPROPYLENE HUB: Brand: MONOJECT

## (undated) DEVICE — TUBING 1895522 5PK STRAIGHTSHOT TO XPS: Brand: STRAIGHTSHOT®

## (undated) DEVICE — SURGICAL SUCTION CONNECTING TUBE WITH MALE CONNECTOR AND SUCTION CLAMP, 2 FT. LONG (.6 M), 5 MM I.D.: Brand: CONMED

## (undated) DEVICE — BLD MYRNGTMY BEAVR LANCE/DWN/CUT NRW 45D

## (undated) DEVICE — COAGULATOR SXN MEGADYNE FT/CONTRL 10F 6IN

## (undated) DEVICE — PK ENT HD AND NK 30

## (undated) DEVICE — SYR CONTRL PRESS/LO FIX/M/LL W/THMB/RNG 10ML